# Patient Record
Sex: MALE | URBAN - NONMETROPOLITAN AREA
[De-identification: names, ages, dates, MRNs, and addresses within clinical notes are randomized per-mention and may not be internally consistent; named-entity substitution may affect disease eponyms.]

---

## 2017-01-24 ENCOUNTER — DOCTOR'S OFFICE (OUTPATIENT)
Dept: URBAN - NONMETROPOLITAN AREA CLINIC 1 | Facility: CLINIC | Age: 42
Setting detail: OPHTHALMOLOGY
End: 2017-01-24
Payer: COMMERCIAL

## 2017-01-24 ENCOUNTER — RX ONLY (RX ONLY)
Age: 42
End: 2017-01-24

## 2017-01-24 DIAGNOSIS — H52.13: ICD-10-CM

## 2017-01-24 PROBLEM — H01.005 BLEPHARITIS; RIGHT UPPER LID, RIGHT LOWER LID, LEFT UPPER LID, LEFT LOWER LID ;
+MGD: Status: ACTIVE | Noted: 2017-01-24

## 2017-01-24 PROBLEM — H01.002 BLEPHARITIS; RIGHT UPPER LID, RIGHT LOWER LID, LEFT UPPER LID, LEFT LOWER LID ;
+MGD: Status: ACTIVE | Noted: 2017-01-24

## 2017-01-24 PROBLEM — H01.004 BLEPHARITIS; RIGHT UPPER LID, RIGHT LOWER LID, LEFT UPPER LID, LEFT LOWER LID ;
+MGD: Status: ACTIVE | Noted: 2017-01-24

## 2017-01-24 PROBLEM — H01.001 BLEPHARITIS; RIGHT UPPER LID, RIGHT LOWER LID, LEFT UPPER LID, LEFT LOWER LID ;
+MGD: Status: ACTIVE | Noted: 2017-01-24

## 2017-01-24 PROCEDURE — NO CHARGE N/C PROFESSIONAL COURTESY: Performed by: OPHTHALMOLOGY

## 2017-01-24 ASSESSMENT — REFRACTION_MANIFEST
OD_VA3: 20/
OU_VA: 20/
OS_VA1: 20/
OD_VA2: 20/
OS_VA3: 20/
OS_VA2: 20/
OU_VA: 20/
OS_VA2: 20/
OS_VA1: 20/
OU_VA: 20/
OS_VA3: 20/
OD_VA1: 20/
OD_VA2: 20/
OD_VA1: 20/
OD_VA2: 20/
OD_VA3: 20/
OS_VA3: 20/
OS_VA1: 20/
OD_VA3: 20/
OS_VA2: 20/
OD_VA1: 20/

## 2017-01-24 ASSESSMENT — PACHYMETRY
OD_CT_UM: 563
OD_CT_CORRECTION: -1
OS_CT_CORRECTION: -3
OS_CT_UM: 580

## 2017-01-24 ASSESSMENT — REFRACTION_CURRENTRX
OD_OVR_VA: 20/
OS_OVR_VA: 20/
OD_OVR_VA: 20/
OS_OVR_VA: 20/
OD_OVR_VA: 20/
OS_OVR_VA: 20/

## 2017-01-24 ASSESSMENT — REFRACTION_AUTOREFRACTION
OD_SPHERE: -5.25
OD_AXIS: 176
OS_AXIS: 174
OS_SPHERE: -4.75
OD_CYLINDER: -2.25
OS_CYLINDER: -1.50

## 2017-01-24 ASSESSMENT — CORNEAL DYSTROPHY
OS_DYSTROPHY: PERIPHERAL CORNEAL DEGENERATION
OD_DYSTROPHY: PERIPHERAL CORNEAL DEGENERATION

## 2017-01-24 ASSESSMENT — SPHEQUIV_DERIVED
OS_SPHEQUIV: -5.5
OD_SPHEQUIV: -6.375

## 2017-01-24 ASSESSMENT — VISUAL ACUITY
OD_BCVA: 20/20-2
OS_BCVA: 20/20

## 2017-01-24 ASSESSMENT — LID EXAM ASSESSMENTS
OS_COMMENTS: +MGD
OD_COMMENTS: +MGD

## 2017-10-20 ENCOUNTER — ALLSCRIPTS OFFICE VISIT (OUTPATIENT)
Dept: OTHER | Facility: OTHER | Age: 42
End: 2017-10-20

## 2017-10-20 DIAGNOSIS — Z13.6 ENCOUNTER FOR SCREENING FOR CARDIOVASCULAR DISORDERS: ICD-10-CM

## 2017-11-03 ENCOUNTER — APPOINTMENT (OUTPATIENT)
Dept: LAB | Facility: MEDICAL CENTER | Age: 42
End: 2017-11-03
Payer: COMMERCIAL

## 2017-11-03 ENCOUNTER — TRANSCRIBE ORDERS (OUTPATIENT)
Dept: LAB | Facility: MEDICAL CENTER | Age: 42
End: 2017-11-03

## 2017-11-03 DIAGNOSIS — Z13.6 ENCOUNTER FOR SCREENING FOR CARDIOVASCULAR DISORDERS: ICD-10-CM

## 2017-11-03 LAB
ALBUMIN SERPL BCP-MCNC: 4.3 G/DL (ref 3.5–5)
ALP SERPL-CCNC: 41 U/L (ref 46–116)
ALT SERPL W P-5'-P-CCNC: 30 U/L (ref 12–78)
ANION GAP SERPL CALCULATED.3IONS-SCNC: 3 MMOL/L (ref 4–13)
AST SERPL W P-5'-P-CCNC: 14 U/L (ref 5–45)
BILIRUB SERPL-MCNC: 1.09 MG/DL (ref 0.2–1)
BUN SERPL-MCNC: 17 MG/DL (ref 5–25)
CALCIUM SERPL-MCNC: 9.1 MG/DL (ref 8.3–10.1)
CHLORIDE SERPL-SCNC: 106 MMOL/L (ref 100–108)
CHOLEST SERPL-MCNC: 188 MG/DL (ref 50–200)
CO2 SERPL-SCNC: 29 MMOL/L (ref 21–32)
CREAT SERPL-MCNC: 1 MG/DL (ref 0.6–1.3)
GFR SERPL CREATININE-BSD FRML MDRD: 94 ML/MIN/1.73SQ M
GLUCOSE P FAST SERPL-MCNC: 86 MG/DL (ref 65–99)
HDLC SERPL-MCNC: 67 MG/DL (ref 40–60)
LDLC SERPL CALC-MCNC: 108 MG/DL (ref 0–100)
POTASSIUM SERPL-SCNC: 4.4 MMOL/L (ref 3.5–5.3)
PROT SERPL-MCNC: 7.4 G/DL (ref 6.4–8.2)
SODIUM SERPL-SCNC: 138 MMOL/L (ref 136–145)
TRIGL SERPL-MCNC: 63 MG/DL

## 2017-11-03 PROCEDURE — 80061 LIPID PANEL: CPT

## 2017-11-03 PROCEDURE — 36415 COLL VENOUS BLD VENIPUNCTURE: CPT

## 2017-11-03 PROCEDURE — 80053 COMPREHEN METABOLIC PANEL: CPT

## 2017-11-06 ENCOUNTER — GENERIC CONVERSION - ENCOUNTER (OUTPATIENT)
Dept: OTHER | Facility: OTHER | Age: 42
End: 2017-11-06

## 2018-01-09 NOTE — RESULT NOTES
Verified Results  (1) COMPREHENSIVE METABOLIC PANEL [id number] 80:05II [facility] Order Number: [id number]     Test Name Result Flag Reference   SODIUM 138 mmol/L  136-145   POTASSIUM 4 4 mmol/L  3 5-5 3   CHLORIDE 106 mmol/L  100-108   CARBON DIOXIDE 29 mmol/L  21-32   ANION GAP (CALC) 3 mmol/L L 4-13   BLOOD UREA NITROGEN 17 mg/dL  5-25   CREATININE 1 00 mg/dL  0 60-1 30   Standardized to IDMS reference method   CALCIUM 9 1 mg/dL  8 3-10 1   BILI, TOTAL 1 09 mg/dL H 0 20-1 00   ALK PHOSPHATAS 41 U/L L    ALT (SGPT) 30 U/L  12-78   Specimen collection should occur prior to Sulfasalazine and/or Sulfapyridine administration due to the potential for falsely depressed results  AST(SGOT) 14 U/L  5-45   Specimen collection should occur prior to Sulfasalazine administration due to the potential for falsely depressed results  ALBUMIN 4 3 g/dL  3 5-5 0   TOTAL PROTEIN 7 4 g/dL  6 4-8 2   eGFR 94 ml/min/1 73sq m     National Kidney Disease Education Program recommendations are as follows:  GFR calculation is accurate only with a steady state creatinine  Chronic Kidney disease less than 60 ml/min/1 73 sq  meters  Kidney failure less than 15 ml/min/1 73 sq  meters  GLUCOSE FASTING 86 mg/dL  65-99   Specimen collection should occur prior to Sulfasalazine administration due to the potential for falsely depressed results  Specimen collection should occur prior to Sulfapyridine administration due to the potential for falsely elevated results  (1) LIPID PANEL, FASTING [id number] 08:34AM [facility] Order Number: [id number]     Test Name Result Flag Reference   CHOLESTEROL 188 mg/dL     HDL,DIRECT 67 mg/dL H 40-60   Specimen collection should occur prior to Metamizole administration due to the potential for falsley depressed results     LDL CHOLESTEROL CALCULATED 108 mg/dL H 0-100   Triglyceride:        Normal <150 mg/dl   Borderline High 150-199 mg/dl   High 200-499 mg/dl   Very High >499 mg/dl      Cholesterol:       Desirable <200 mg/dl    Borderline High 200-239 mg/dl    High >239 mg/dl      HDL Cholesterol:       High>59 mg/dL    Low <41 mg/dL      This screening LDL is a calculated result  It does not have the accuracy of the Direct Measured LDL in the monitoring of patients with hyperlipidemia and/or statin therapy  Direct Measure LDL (YNP884) must be ordered separately in these patients  TRIGLYCERIDES 63 mg/dL  <=150   Specimen collection should occur prior to N-Acetylcysteine or Metamizole administration due to the potential for falsely depressed results

## 2018-01-13 ENCOUNTER — APPOINTMENT (OUTPATIENT)
Dept: URGENT CARE | Facility: CLINIC | Age: 43
End: 2018-01-13
Payer: COMMERCIAL

## 2018-01-13 ENCOUNTER — GENERIC CONVERSION - ENCOUNTER (OUTPATIENT)
Dept: OTHER | Facility: OTHER | Age: 43
End: 2018-01-13

## 2018-01-13 PROCEDURE — 99203 OFFICE O/P NEW LOW 30 MIN: CPT

## 2018-01-17 NOTE — PROGRESS NOTES
Assessment    1  Encounter for preventive health examination (V70 0) (Z00 00)    Plan  Encounter for screening for cardiovascular disorders    · (1) COMPREHENSIVE METABOLIC PANEL; Status:Active; Requested [id number];    · (1) LIPID PANEL, FASTING; Status:Active; Requested [id number]; Health Maintenance    · Follow-up visit in 1 year Evaluation and Treatment  Follow-up  Status: Complete  Done:  20Oct2017  Need for influenza vaccination    · Fluzone Quadrivalent 0 5 ML Intramuscular Suspension Prefilled Syringe    Discussion/Summary  Impression: healthy adult male  Currently, he eats a healthy diet  Prostate cancer screening: PSA is not indicated  Testicular cancer screening: testicular cancer screening is not indicated  Colorectal cancer screening: colorectal cancer screening is not indicated  The immunizations will be given as outlined in the orders  Advice and education were given regarding nutrition and tobacco cessation  Patient discussion: discussed with the patient  Flu shot given today  We'll check his cholesterol  Follow-up yearly and when necessary  The treatment plan was reviewed with the patient/guardian  The patient/guardian understands and agrees with the treatment plan      Chief Complaint  New patient well visit      History of Present Illness  HM, Adult Male: The patient is being seen for a health maintenance evaluation  General Health: The patient's health since the last visit is described as good  He has regular dental visits  He denies vision problems  He denies hearing loss  Immunizations status: up to date  Lifestyle:  He consumes a diverse and healthy diet  He does not have any weight concerns  He exercises regularly  He uses tobacco  The patient is a current cigar smoker  Tobacco Use Duration: Smokes 4 cigars a year  Screening: cancer screening reviewed and current  HPI: New patient   Only significant past medical history was GERD a couple years ago, which resolved with weight loss  Denies any chest pain or shortness of breath  Patient exercises  No first-degree relatives with cancer      Review of Systems    Constitutional: No fever or chills, feels well, no tiredness, no recent weight gain or weight loss  Cardiovascular: No complaints of slow heart rate, no fast heart rate, no chest pain, no palpitations, no leg claudication, no lower extremity  Respiratory: No complaints of shortness of breath, no wheezing, no cough, no SOB on exertion, no orthopnea or PND  Gastrointestinal: No complaints of abdominal pain, no constipation, no nausea or vomiting, no diarrhea or bloody stools  Genitourinary: No complaints of dysuria, no incontinence, no hesitancy, no nocturia, no genital lesion, no testicular pain  Musculoskeletal: No complaints of arthralgia, no myalgias, no joint swelling or stiffness, no limb pain or swelling  Past Medical History    · History of gastroesophageal reflux (GERD) (V12 79) (Z87 19)    Surgical History    · History of Diagnostic Esophagogastroduodenoscopy    Family History  Mother    · No pertinent family history  Father    · No pertinent family history    Social History    · Always uses seat belt   · Caffeine use (V49 89) (F15 90)   · Cigar smoker (305 1) (F17 290)   · Employed   · Light tobacco smoker (305 1) (F17 200)   · Living will in place (V49 89) (Z78 9)   ·    · Pets/Animals: Cat   · Pets/Animals: Dog   · Yazdanism    Current Meds   1  Multi Vitamin Daily TABS; Therapy: (Recorded:20Oct2017) to Recorded    Allergies    1  No Known Drug Allergies    Vitals   Recorded: 24BWL4506 59:99MH   Systolic 321   Diastolic 74   Height 5 ft 10 in   Weight 186 lb    BMI Calculated 26 69   BSA Calculated 2 02     Physical Exam    Constitutional   General appearance: No acute distress, well appearing and well nourished  Pulmonary   Respiratory effort: No increased work of breathing or signs of respiratory distress      Auscultation of lungs: Clear to auscultation  Cardiovascular   Auscultation of heart: Normal rate and rhythm, normal S1 and S2, no murmurs  Examination of extremities for edema and/or varicosities: Normal     Psychiatric   Orientation to person, place and time: Normal     Mood and affect: Normal        Results/Data  PHQ-2 Adult Depression Screening 20Oct2017 01:29PM User, Adonay     Test Name Result Flag Reference   PHQ-2 Adult Depression Score 0     Over the last two weeks, how often have you been bothered by any of the following problems?   Little interest or pleasure in doing things: Not at all - 0  Feeling down, depressed, or hopeless: Not at all - 0   PHQ-2 Adult Depression Screening Negative         Signatures   Electronically signed by : Henna Dodson DO; Oct 20 2017  3:39PM EST                       (Author)

## 2018-01-22 VITALS
SYSTOLIC BLOOD PRESSURE: 110 MMHG | DIASTOLIC BLOOD PRESSURE: 74 MMHG | WEIGHT: 186 LBS | HEIGHT: 70 IN | BODY MASS INDEX: 26.63 KG/M2

## 2018-01-24 VITALS
OXYGEN SATURATION: 96 % | SYSTOLIC BLOOD PRESSURE: 124 MMHG | TEMPERATURE: 99.6 F | DIASTOLIC BLOOD PRESSURE: 58 MMHG | RESPIRATION RATE: 16 BRPM | HEART RATE: 96 BPM

## 2018-02-28 NOTE — MISCELLANEOUS
Message  Return to work or school:   Gomez Simmons is under my professional care   He was seen in my office on 01/13/18   He is able to return to work on  01/16/17            Signatures   Electronically signed by : Mikala Thomas DO; Jan 13 2018  8:55AM EST                       (Author)

## 2018-02-28 NOTE — PROGRESS NOTES
Assessment    1  URTI (acute upper respiratory infection) (465 9) (J06 9)    Plan  URTI (acute upper respiratory infection)    · Azithromycin 250 MG Oral Tablet; TAKE 2 TABLETS ON DAY 1 THEN TAKE 1  TABLET A DAY FOR 4 DAYS   · Benzonatate 200 MG Oral Capsule; Take one three times a day as needed for  cough   · Avoid exposure to cigarette smoke ; Status:Complete;   Done: 47DEN5467 08:47AM   · Drink at least 6 glasses of water or juice a day ; Status:Complete;   Done: 35ZXU5709  08:47AM   · The following home treatments may soothe a sore throat ; Status:Complete;   Done:  61EFL2609 08:47AM   · Follow Up if Not Better Evaluation and Treatment  Follow-up  Status: Complete  Done:  18NCB0907 08:47AM    Discussion/Summary  Discussion Summary:   I recommend supportive care fluids rest follow-up if not a lot better in  Medication Side Effects Reviewed: Possible side effects of new medications were reviewed with the patient/guardian today  Understands and agrees with treatment plan: The treatment plan was reviewed with the patient/guardian  The patient/guardian understands and agrees with the treatment plan   Counseling Documentation With Imm: The patient was counseled regarding instructions for management, impressions, importance of compliance with treatment  Chief Complaint    1  Cold Symptoms  Chief Complaint Free Text Note Form: Fever and cough for 2 days  Relates fever was 103 yesterday taken temporally      History of Present Illness  HPI: He is in today complaining sore throat congestion postnasal drip for the last week  He has tried over-the-counter medications      Review of Systems  Focused-Male:   Constitutional: fever, feeling poorly and feeling tired, but no chills  ENT: earache, sore throat, nasal discharge and hoarseness  Cardiovascular: no complaints of slow or fast heart rate, no chest pain, no palpitations, no leg claudication or lower extremity edema  Respiratory: cough and PND  Gastrointestinal: no complaints of abdominal pain, no constipation, no nausea or vomiting, no diarrhea or bloody stools  Genitourinary: no complaints of dysuria or incontinence, no hesitancy, no nocturia, no genital lesion, no inadequacy of penile erection  Musculoskeletal: myalgias  Active Problems    1  Encounter for screening for cardiovascular disorders (V81 2) (Z13 6)   2  Need for influenza vaccination (V04 81) (Z23)    Past Medical History    1  History of gastroesophageal reflux (GERD) (V12 79) (Z87 19)  Active Problems And Past Medical History Reviewed: The active problems and past medical history were reviewed and updated today  Family History  Mother    1  No pertinent family history  Father    2  No pertinent family history    Social History    · Always uses seat belt   · Caffeine use (V49 89) (F15 90)   · Cigar smoker (305 1) (F17 290)   · Employed   · Light tobacco smoker (305 1) (F17 200)   · Living will in place (V49 89) (Z78 9)   ·    · Pets/Animals: Cat   · Pets/Animals: Dog   · Amish  Social History Reviewed: The social history was reviewed and updated today  Surgical History    1  History of Diagnostic Esophagogastroduodenoscopy    Current Meds   1  Multi Vitamin Daily TABS; Therapy: (Recorded:29Ani0508) to Recorded  Medication List Reviewed: The medication list was reviewed and updated today  Allergies    1  No Known Drug Allergies    Vitals  Signs   Recorded: 16FCA8221 08:23AM   Temperature: 99 6 F  Heart Rate: 96  Respiration: 16  Systolic: 129  Diastolic: 58  O2 Saturation: 96    Physical Exam    Constitutional   General appearance: No acute distress, well appearing and well nourished  Eyes   Conjunctiva and lids: No swelling, erythema, or discharge  Pupils and irises: Equal, round and reactive to light      Ears, Nose, Mouth, and Throat   External inspection of ears and nose: Normal     Otoscopic examination: Tympanic membrance translucent with normal light reflex  Canals patent without erythema  Nasal mucosa, septum, and turbinates: Abnormal   There was a purulent discharge from both nares  The bilateral nasal mucosa was edematous  Oropharynx: Abnormal   The posterior pharynx was erythematous, but did not have an exudate  Oral mucosa was edematous  Pulmonary   Respiratory effort: No increased work of breathing or signs of respiratory distress  Auscultation of lungs: Abnormal   diffuse rales/crackles bilaterally  diffuse rhonchi bilaterally  no wheezing  Cardiovascular   Palpation of heart: Normal PMI, no thrills  Auscultation of heart: Normal rate and rhythm, normal S1 and S2, without murmurs  Examination of extremities for edema and/or varicosities: Normal     Abdomen   Abdomen: Non-tender, no masses  Liver and spleen: No hepatomegaly or splenomegaly  Lymphatic   Palpation of lymph nodes in neck: No lymphadenopathy  Musculoskeletal   Gait and station: Normal     Digits and nails: Normal without clubbing or cyanosis  Inspection/palpation of joints, bones, and muscles: Normal     Skin   Skin and subcutaneous tissue: Normal without rashes or lesions  Neurologic   Cranial nerves: Cranial nerves 2-12 intact  Reflexes: 2+ and symmetric  Sensation: No sensory loss      Psychiatric   Orientation to person, place and time: Normal     Mood and affect: Normal        Signatures   Electronically signed by : Torrey Syed DO; Jan 13 2018  8:48AM EST                       (Author)

## 2018-06-15 ENCOUNTER — OFFICE VISIT (OUTPATIENT)
Dept: FAMILY MEDICINE CLINIC | Facility: CLINIC | Age: 43
End: 2018-06-15
Payer: COMMERCIAL

## 2018-06-15 VITALS
WEIGHT: 184.4 LBS | HEIGHT: 71 IN | SYSTOLIC BLOOD PRESSURE: 102 MMHG | DIASTOLIC BLOOD PRESSURE: 70 MMHG | BODY MASS INDEX: 25.81 KG/M2

## 2018-06-15 DIAGNOSIS — F41.1 GENERALIZED ANXIETY DISORDER: Primary | ICD-10-CM

## 2018-06-15 PROCEDURE — 99214 OFFICE O/P EST MOD 30 MIN: CPT | Performed by: FAMILY MEDICINE

## 2018-06-15 PROCEDURE — 3008F BODY MASS INDEX DOCD: CPT | Performed by: FAMILY MEDICINE

## 2018-06-15 RX ORDER — MULTIVITAMIN
TABLET ORAL
COMMUNITY
End: 2022-06-10

## 2018-06-15 NOTE — PROGRESS NOTES
Assessment/Plan:  25 minutes spent with the patient, over half the time in counseling on treatment of anxiety and depression  Patient will call his insurance about counseling  I put in a prescription for sertraline 50 mg  He will start off taking half a pill, and over couple days may increase to a full pill daily  He will call us if he has any problems  We will see him back in the next 2-3 weeks or p r n  No problem-specific Assessment & Plan notes found for this encounter  Diagnoses and all orders for this visit:    Generalized anxiety disorder  -     sertraline (ZOLOFT) 50 mg tablet; Take 1 tablet (50 mg total) by mouth daily    Other orders  -     Multiple Vitamin (MULTI VITAMIN DAILY) TABS; Take by mouth          Subjective:      Patient ID: Mony Kumar is a 38 y o  male  Patient here today stating he has been having increased anxiety, insomnia, OCD  This started 2-3 months ago, but increased over the past 3 weeks  Recently lost a close head  Denies any SI or HI  Patient states has been more claustrophobic and has had more OCD symptoms  His wife asked him to come in to talk today  He has always had some OCD and claustrophobia, but again it has become worse over the past couple weeks  Patient has never treated for depression or anxiety  He denies any family history of that  Anxiety   Presents for initial visit  Onset was 1 to 6 months ago  The problem has been gradually worsening  Symptoms include compulsions, excessive worry, insomnia, nervous/anxious behavior, obsessions and restlessness  Patient reports no suicidal ideas  Symptoms occur constantly  The severity of symptoms is moderate  Nothing aggravates the symptoms  The quality of sleep is fair  Nighttime awakenings: occasional      Risk factors: Recent loss of close PET  Treatments tried: Has had some drinks at night to help him sleep         The following portions of the patient's history were reviewed and updated as appropriate:   He  has no past medical history on file  He   Patient Active Problem List    Diagnosis Date Noted    Generalized anxiety disorder 06/15/2018     He  has no past surgical history on file  His family history is not on file  He  reports that he has never smoked  He has never used smokeless tobacco  He reports that he drinks alcohol  He reports that he does not use drugs  Current Outpatient Prescriptions   Medication Sig Dispense Refill    Multiple Vitamin (MULTI VITAMIN DAILY) TABS Take by mouth      sertraline (ZOLOFT) 50 mg tablet Take 1 tablet (50 mg total) by mouth daily 30 tablet 5     No current facility-administered medications for this visit  No current outpatient prescriptions on file prior to visit  No current facility-administered medications on file prior to visit  He has No Known Allergies       Review of Systems   Constitutional: Negative  Respiratory: Negative  Cardiovascular: Negative  Gastrointestinal: Negative  Genitourinary: Negative  Psychiatric/Behavioral: Positive for dysphoric mood and sleep disturbance  Negative for suicidal ideas  The patient is nervous/anxious and has insomnia  Objective:      /70   Ht 5' 11" (1 803 m)   Wt 83 6 kg (184 lb 6 4 oz)   BMI 25 72 kg/m²          Physical Exam   Constitutional: He is oriented to person, place, and time  He appears well-developed and well-nourished  No distress  Cardiovascular: Normal rate, regular rhythm and normal heart sounds  Exam reveals no gallop and no friction rub  No murmur heard  Pulmonary/Chest: Effort normal and breath sounds normal  No respiratory distress  He has no wheezes  He has no rales  Musculoskeletal: He exhibits no edema  Neurological: He is alert and oriented to person, place, and time  Skin: He is not diaphoretic  Psychiatric: He has a normal mood and affect  His behavior is normal  Judgment and thought content normal    Vitals reviewed

## 2018-11-17 ENCOUNTER — OFFICE VISIT (OUTPATIENT)
Dept: URGENT CARE | Facility: CLINIC | Age: 43
End: 2018-11-17
Payer: COMMERCIAL

## 2018-11-17 ENCOUNTER — APPOINTMENT (OUTPATIENT)
Dept: RADIOLOGY | Facility: CLINIC | Age: 43
End: 2018-11-17
Payer: COMMERCIAL

## 2018-11-17 VITALS
SYSTOLIC BLOOD PRESSURE: 120 MMHG | BODY MASS INDEX: 24.38 KG/M2 | WEIGHT: 180 LBS | HEART RATE: 86 BPM | HEIGHT: 72 IN | RESPIRATION RATE: 18 BRPM | OXYGEN SATURATION: 97 % | DIASTOLIC BLOOD PRESSURE: 55 MMHG | TEMPERATURE: 98.8 F

## 2018-11-17 DIAGNOSIS — M79.644 PAIN OF RIGHT THUMB: Primary | ICD-10-CM

## 2018-11-17 DIAGNOSIS — S63.621A SPRAIN OF INTERPHALANGEAL JOINT OF RIGHT THUMB, INITIAL ENCOUNTER: ICD-10-CM

## 2018-11-17 DIAGNOSIS — M79.644 PAIN OF RIGHT THUMB: ICD-10-CM

## 2018-11-17 PROCEDURE — 73130 X-RAY EXAM OF HAND: CPT

## 2018-11-17 PROCEDURE — 99213 OFFICE O/P EST LOW 20 MIN: CPT | Performed by: FAMILY MEDICINE

## 2018-11-17 RX ORDER — MULTIVITAMIN
CAPSULE ORAL
COMMUNITY
End: 2019-06-02

## 2018-11-17 NOTE — PROGRESS NOTES
Assessment/Plan:  Possible cortical disruption of the right thumb  Will review with radiologist and call the patient  I do recommend he follow up with his family physician this week  Diagnoses and all orders for this visit:    Pain of right thumb  -     XR hand 3+ vw right; Future    Sprain of interphalangeal joint of right thumb, initial encounter    Other orders  -     Multiple Vitamin (MULTIVITAMIN) capsule; Take by mouth          Subjective:      Patient ID: Milana Garcia is a 38 y o  male  Patient presents with:  Thumb Pain: dislocated right thumb this morning    Complains of pain in the right thumb  He says that used to pop out before during sports  Today he was  his son's who were wrestling and injured that thumb  Is decreased range of motion secondary to pain  The following portions of the patient's history were reviewed and updated as appropriate: allergies, current medications, past family history, past medical history, past social history, past surgical history and problem list     Review of Systems   Constitutional: Negative  HENT: Negative  Eyes: Negative  Respiratory: Negative  Cardiovascular: Negative  Gastrointestinal: Negative  Endocrine: Negative  Genitourinary: Negative  Musculoskeletal: Positive for joint swelling  Pain and swelling in right  Skin: Negative  Allergic/Immunologic: Negative  Neurological: Negative  Hematological: Negative  Psychiatric/Behavioral: Negative  All other systems reviewed and are negative  Objective:      /55   Pulse 86   Temp 98 8 °F (37 1 °C)   Resp 18   Ht 6' (1 829 m)   Wt 81 6 kg (180 lb)   SpO2 97%   BMI 24 41 kg/m²          Physical Exam   Constitutional: He is oriented to person, place, and time  He appears well-developed and well-nourished  HENT:   Head: Normocephalic and atraumatic     Right Ear: External ear normal    Left Ear: External ear normal    Nose: Nose normal    Mouth/Throat: Oropharynx is clear and moist    Neck: Normal range of motion  Neck supple  Cardiovascular: Normal rate, regular rhythm and normal heart sounds  Pulmonary/Chest: Effort normal and breath sounds normal    Musculoskeletal: He exhibits edema and tenderness  He exhibits no deformity  Exam shows some swelling in the right first MCP  If neurovascular appears intact  Neurological: He is alert and oriented to person, place, and time  He has normal reflexes  Skin: Skin is warm and dry  Psychiatric: He has a normal mood and affect  His behavior is normal    Nursing note and vitals reviewed

## 2019-06-02 ENCOUNTER — OFFICE VISIT (OUTPATIENT)
Dept: URGENT CARE | Facility: CLINIC | Age: 44
End: 2019-06-02
Payer: COMMERCIAL

## 2019-06-02 VITALS
HEART RATE: 64 BPM | WEIGHT: 180 LBS | RESPIRATION RATE: 18 BRPM | SYSTOLIC BLOOD PRESSURE: 124 MMHG | TEMPERATURE: 97.7 F | OXYGEN SATURATION: 98 % | DIASTOLIC BLOOD PRESSURE: 58 MMHG | BODY MASS INDEX: 25.2 KG/M2 | HEIGHT: 71 IN

## 2019-06-02 DIAGNOSIS — J06.9 VIRAL UPPER RESPIRATORY TRACT INFECTION: Primary | ICD-10-CM

## 2019-06-02 PROCEDURE — 99213 OFFICE O/P EST LOW 20 MIN: CPT | Performed by: EMERGENCY MEDICINE

## 2019-06-02 RX ORDER — PREDNISONE 10 MG/1
TABLET ORAL
Qty: 27 TABLET | Refills: 0 | Status: SHIPPED | OUTPATIENT
Start: 2019-06-02 | End: 2019-07-01

## 2019-07-01 ENCOUNTER — OFFICE VISIT (OUTPATIENT)
Dept: FAMILY MEDICINE CLINIC | Facility: CLINIC | Age: 44
End: 2019-07-01
Payer: COMMERCIAL

## 2019-07-01 VITALS
DIASTOLIC BLOOD PRESSURE: 70 MMHG | SYSTOLIC BLOOD PRESSURE: 118 MMHG | WEIGHT: 180 LBS | HEIGHT: 71 IN | BODY MASS INDEX: 25.2 KG/M2

## 2019-07-01 DIAGNOSIS — Z00.00 ANNUAL PHYSICAL EXAM: Primary | ICD-10-CM

## 2019-07-01 PROCEDURE — 99396 PREV VISIT EST AGE 40-64: CPT | Performed by: FAMILY MEDICINE

## 2019-07-01 NOTE — PROGRESS NOTES
ADULT ANNUAL PHYSICAL  [facility] - [facility]    NAME: Wali Newman  AGE: 41 y o  SEX: male  : 1975     DATE: 2019     Assessment and Plan: For his motion sickness, patient will try taking Claritin daily for the next month to see if he gets improvement  He will call us if he continues to have problems, if so, we will refer him to ENT  Follow-up yearly and p r n     Problem List Items Addressed This Visit     None      Visit Diagnoses     Annual physical exam    -  Primary    BMI 25 0-25 9,adult              Immunizations and preventive care screenings were discussed with patient today  Appropriate education was printed on patient's after visit summary  Counseling:  · BMI Counseling: Body mass index is 25 1 kg/m²  Discussed the patient's BMI with him  The BMI is above average  BMI counseling and education was provided to the patient  Nutrition recommendations include reducing portion sizes, moderation in carbohydrate intake, increasing intake of lean protein, reducing intake of saturated fat and trans fat and reducing intake of cholesterol  No follow-ups on file  Chief Complaint:     Chief Complaint   Patient presents with    Annual Exam      History of Present Illness:     Adult Annual Physical   Patient here for a comprehensive physical exam  The patient reports Motion sickness at times  Diet and Physical Activity  · Diet/Nutrition: well balanced diet  · Exercise: moderate cardiovascular exercise and strength training exercises  Depression Screening  PHQ-9 Depression Screening    PHQ-9:    Frequency of the following problems over the past two weeks:       Little interest or pleasure in doing things:  0 - not at all  Feeling down, depressed, or hopeless:  0 - not at all  PHQ-2 Score:  0       General Health  · Sleep: sleeps well  · Hearing: normal - bilateral   · Vision: no vision problems  · Dental: regular dental visits          Health  · Symptoms include: none     Review of Systems:     Review of Systems   Constitutional: Negative  Respiratory: Negative  Cardiovascular: Negative  Gastrointestinal: Negative  Genitourinary: Negative  Neurological: Positive for light-headedness (Patient has noticed for the past 6 months or so that when he is driving sometimes gets motion sickness  It does not happen all the time  )        Past Medical History:     Past Medical History:   Diagnosis Date    Known health problems: none       Past Surgical History:     Past Surgical History:   Procedure Laterality Date    ESOPHAGOGASTRODUODENOSCOPY      LASIK        Family History:     Family History   Problem Relation Age of Onset    No Known Problems Mother     No Known Problems Father       Social History:     Social History     Socioeconomic History    Marital status: /Civil Union     Spouse name: None    Number of children: None    Years of education: None    Highest education level: None   Occupational History    None   Social Needs    Financial resource strain: None    Food insecurity:     Worry: None     Inability: None    Transportation needs:     Medical: None     Non-medical: None   Tobacco Use    Smoking status: Never Smoker    Smokeless tobacco: Never Used   Substance and Sexual Activity    Alcohol use: Yes     Comment: socially    Drug use: No    Sexual activity: None   Lifestyle    Physical activity:     Days per week: None     Minutes per session: None    Stress: None   Relationships    Social connections:     Talks on phone: None     Gets together: None     Attends [employer] service: None     Active member of club or organization: None     Attends meetings of clubs or organizations: None     Relationship status: None    Intimate partner violence:     Fear of current or ex partner: None     Emotionally abused: None     Physically abused: None     Forced sexual activity: None   Other Topics Concern    None   Social History Narrative        Cat and dog    Always uses seat belt    Caffeine use    Living will    Synagogue      Current Medications:     Current Outpatient Medications   Medication Sig Dispense Refill    Multiple Vitamin (MULTI VITAMIN DAILY) TABS Take by mouth       No current facility-administered medications for this visit  Allergies:     No Known Allergies   Physical Exam:     /70   Ht 5' 11" (1 803 m)   Wt 81 6 kg (180 lb)   BMI 25 10 kg/m²     Physical Exam   Constitutional: He is oriented to person, place, and time  He appears well-developed and well-nourished  No distress  HENT:   Head: Normocephalic and atraumatic  Right Ear: Tympanic membrane normal    Left Ear: Tympanic membrane is retracted  Eyes: Conjunctivae are normal    Cardiovascular: Normal rate, regular rhythm and normal heart sounds  Exam reveals no gallop and no friction rub  No murmur heard  Pulmonary/Chest: Effort normal and breath sounds normal  No respiratory distress  He has no wheezes  He has no rales  Musculoskeletal: He exhibits no edema  Neurological: He is alert and oriented to person, place, and time  Skin: He is not diaphoretic  Psychiatric: He has a normal mood and affect  His behavior is normal  Judgment and thought content normal    Vitals reviewed  [facility]  BMI Counseling: Body mass index is 25 1 kg/m²  Discussed the patient's BMI with him  The BMI is above average  BMI counseling and education was provided to the patient  Nutrition recommendations include reducing portion sizes, moderation in carbohydrate intake, increasing intake of lean protein, reducing intake of saturated fat and trans fat and reducing intake of cholesterol

## 2019-07-01 NOTE — PATIENT INSTRUCTIONS
Wellness Visit for Adults   AMBULATORY CARE:   A wellness visit  is when you see your healthcare provider to get screened for health problems  You can also get advice on how to stay healthy  Write down your questions so you remember to ask them  Ask your healthcare provider how often you should have a wellness visit  What happens at a wellness visit:  Your healthcare provider will ask about your health, and your family history of health problems  This includes high blood pressure, heart disease, and cancer  He or she will ask if you have symptoms that concern you, if you smoke, and about your mood  You may also be asked about your intake of medicines, supplements, food, and alcohol  Any of the following may be done:  · Your weight  will be checked  Your height may also be checked so your body mass index (BMI) can be calculated  Your BMI shows if you are at a healthy weight  · Your blood pressure  and heart rate will be checked  Your temperature may also be checked  · Blood and urine tests  may be done  Blood tests may be done to check your cholesterol levels  Abnormal cholesterol levels increase your risk for heart disease and stroke  You may also need a blood or urine test to check for diabetes if you are at increased risk  Urine tests may be done to look for signs of an infection or kidney disease  · A physical exam  includes checking your heartbeat and lungs with a stethoscope  Your healthcare provider may also check your skin to look for sun damage  · Screening tests  may be recommended  A screening test is done to check for diseases that may not cause symptoms  The screening tests you may need depend on your age, gender, family history, and lifestyle habits  For example, colorectal screening may be recommended if you are 48years old or older  Screening tests you need if you are a woman:   · A Pap smear  is used to screen for cervical cancer   Pap smears are usually done every 3 to 5 years depending on your age  You may need them more often if you have had abnormal Pap smear test results in the past  Ask your healthcare provider how often you should have a Pap smear  · A mammogram  is an x-ray of your breasts to screen for breast cancer  Experts recommend mammograms every 2 years starting at age 48 years  You may need a mammogram at age 52 years or younger if you have an increased risk for breast cancer  Talk to your healthcare provider about when you should start having mammograms and how often you need them  Vaccines you may need:   · Get an influenza vaccine  every year  The influenza vaccine protects you from the flu  Several types of viruses cause the flu  The viruses change over time, so new vaccines are made each year  · Get a tetanus-diphtheria (Td) booster vaccine  every 10 years  This vaccine protects you against tetanus and diphtheria  Tetanus is a severe infection that may cause painful muscle spasms and lockjaw  Diphtheria is a severe bacterial infection that causes a thick covering in the back of your mouth and throat  · Get a human papillomavirus (HPV) vaccine  if you are female and aged 23 to 32 or male 23 to 24 and never received it  This vaccine protects you from HPV infection  HPV is the most common infection spread by sexual contact  HPV may also cause vaginal, penile, and anal cancers  · Get a pneumococcal vaccine  if you are aged 72 years or older  The pneumococcal vaccine is an injection given to protect you from pneumococcal disease  Pneumococcal disease is an infection caused by pneumococcal bacteria  The infection may cause pneumonia, meningitis, or an ear infection  · Get a shingles vaccine  if you are aged 61 or older, even if you have had shingles before  The shingles vaccine is an injection to protect you from the varicella-zoster virus  This is the same virus that causes chickenpox   Shingles is a painful rash that develops in people who had chickenpox or have been exposed to the virus  How to eat healthy:  My Plate is a model for planning healthy meals  It shows the types and amounts of foods that should go on your plate  Fruits and vegetables make up about half of your plate, and grains and protein make up the other half  A serving of dairy is included on the side of your plate  The amount of calories and serving sizes you need depends on your age, gender, weight, and height  Examples of healthy foods are listed below:  · Eat a variety of vegetables  such as dark green, red, and orange vegetables  You can also include canned vegetables low in sodium (salt) and frozen vegetables without added butter or sauces  · Eat a variety of fresh fruits , canned fruit in 100% juice, frozen fruit, and dried fruit  · Include whole grains  At least half of the grains you eat should be whole grains  Examples include whole-wheat bread, wheat pasta, brown rice, and whole-grain cereals such as oatmeal     · Eat a variety of protein foods such as seafood (fish and shellfish), lean meat, and poultry without skin (turkey and chicken)  Examples of lean meats include pork leg, shoulder, or tenderloin, and beef round, sirloin, tenderloin, and extra lean ground beef  Other protein foods include eggs and egg substitutes, beans, peas, soy products, nuts, and seeds  · Choose low-fat dairy products such as skim or 1% milk or low-fat yogurt, cheese, and cottage cheese  · Limit unhealthy fats  such as butter, hard margarine, and shortening  Exercise:  Exercise at least 30 minutes per day on most days of the week  Some examples of exercise include walking, biking, dancing, and swimming  You can also fit in more physical activity by taking the stairs instead of the elevator or parking farther away from stores  Include muscle strengthening activities 2 days each week  Regular exercise provides many health benefits   It helps you manage your weight, and decreases your risk for type 2 diabetes, heart disease, stroke, and high blood pressure  Exercise can also help improve your mood  Ask your healthcare provider about the best exercise plan for you  General health and safety guidelines:   · Do not smoke  Nicotine and other chemicals in cigarettes and cigars can cause lung damage  Ask your healthcare provider for information if you currently smoke and need help to quit  E-cigarettes or smokeless tobacco still contain nicotine  Talk to your healthcare provider before you use these products  · Limit alcohol  A drink of alcohol is 12 ounces of beer, 5 ounces of wine, or 1½ ounces of liquor  · Lose weight, if needed  Being overweight increases your risk of certain health conditions  These include heart disease, high blood pressure, type 2 diabetes, and certain types of cancer  · Protect your skin  Do not sunbathe or use tanning beds  Use sunscreen with a SPF 15 or higher  Apply sunscreen at least 15 minutes before you go outside  Reapply sunscreen every 2 hours  Wear protective clothing, hats, and sunglasses when you are outside  · Drive safely  Always wear your seatbelt  Make sure everyone in your car wears a seatbelt  A seatbelt can save your life if you are in an accident  Do not use your cell phone when you are driving  This could distract you and cause an accident  Pull over if you need to make a call or send a text message  · Practice safe sex  Use latex condoms if are sexually active and have more than one partner  Your healthcare provider may recommend screening tests for sexually transmitted infections (STIs)  · Wear helmets, lifejackets, and protective gear  Always wear a helmet when you ride a bike or motorcycle, go skiing, or play sports that could cause a head injury  Wear protective equipment when you play sports  Wear a lifejacket when you are on a boat or doing water sports    © 2017 [employer] Information is for End User's use only and may not be sold, redistributed or otherwise used for commercial purposes  All illustrations and images included in CareNotes® are the copyrighted property of [employer]  or [employer]  The above information is an  only  It is not intended as medical advice for individual conditions or treatments  Talk to your doctor, nurse or pharmacist before following any medical regimen to see if it is safe and effective for you  Heart Healthy Diet   AMBULATORY CARE:   A heart healthy diet  is an eating plan low in total fat, unhealthy fats, and sodium (salt)  A heart healthy diet helps decrease your risk for heart disease and stroke  Limit the amount of fat you eat to 25% to 35% of your total daily calories  Limit sodium to less than 2,300 mg each day  Healthy fats:  Healthy fats can help improve cholesterol levels  The risk for heart disease is decreased when cholesterol levels are normal  Choose healthy fats, such as the following:  · Unsaturated fat  is found in foods such as soybean, canola, olive, corn, and safflower oils  It is also found in soft tub margarine that is made with liquid vegetable oil  · Omega-3 fat  is found in certain fish, such as salmon, tuna, and trout, and in walnuts and flaxseed  Unhealthy fats:  Unhealthy fats can cause unhealthy cholesterol levels in your blood and increase your risk of heart disease  Limit unhealthy fats, such as the following:  · Cholesterol  is found in animal foods, such as eggs and lobster, and in dairy products made from whole milk  Limit cholesterol to less than 300 milligrams (mg) each day  You may need to limit cholesterol to 200 mg each day if you have heart disease  · Saturated fat  is found in meats, such as mcintyre and hamburger  It is also found in chicken or turkey skin, whole milk, and butter  Limit saturated fat to less than 7% of your total daily calories   Limit saturated fat to less than 6% if you have heart disease or are at increased risk for it  · Trans fat  is found in packaged foods, such as potato chips and cookies  It is also in hard margarine, some fried foods, and shortening  Avoid trans fats as much as possible    Heart healthy foods and drinks to include:  Ask your dietitian or healthcare provider how many servings to have from each of the following food groups:  · Grains:      ¨ Whole-wheat breads, cereals, and pastas, and brown rice    ¨ Low-fat, low-sodium crackers and chips    · Vegetables:      ¨ Broccoli, green beans, green peas, and spinach    ¨ Collards, kale, and lima beans    ¨ Carrots, sweet potatoes, tomatoes, and peppers    ¨ Canned vegetables with no salt added    · Fruits:      ¨ Bananas, peaches, pears, and pineapple    ¨ Grapes, raisins, and dates    ¨ Oranges, tangerines, grapefruit, orange juice, and grapefruit juice    ¨ Apricots, mangoes, melons, and papaya    ¨ Raspberries and strawberries    ¨ Canned fruit with no added sugar    · Low-fat dairy products:      ¨ Nonfat (skim) milk, 1% milk, and low-fat almond, cashew, or soy milks fortified with calcium    ¨ Low-fat cheese, regular or frozen yogurt, and cottage cheese    · Meats and proteins , such as lean cuts of beef and pork (loin, leg, round), skinless chicken and turkey, legumes, soy products, egg whites, and nuts  Foods and drinks to limit or avoid:  Ask your dietitian or healthcare provider about these and other foods that are high in unhealthy fat, sodium, and sugar:  · Snack or packaged foods , such as frozen dinners, cookies, macaroni and cheese, and cereals with more than 300 mg of sodium per serving    · Canned or dry mixes  for cakes, soups, sauces, or gravies    · Vegetables with added sodium , such as instant potatoes, vegetables with added sauces, or regular canned vegetables    · Other foods high in sodium , such as ketchup, barbecue sauce, salad dressing, pickles, olives, soy sauce, and miso    · High-fat dairy foods  such as whole or 2% milk, cream cheese, or sour cream, and cheeses     · High-fat protein foods  such as high-fat cuts of beef (T-bone steaks, ribs), chicken or turkey with skin, and organ meats, such as liver    · Cured or smoked meats , such as hot dogs, mcintyre, and sausage    · Unhealthy fats and oils , such as butter, stick margarine, shortening, and cooking oils such as coconut or palm oil    · Food and drinks high in sugar , such as soft drinks (soda), sports drinks, sweetened tea, candy, cake, cookies, pies, and doughnuts  Other diet guidelines to follow:   · Eat more foods containing omega-3 fats  Eat fish high in omega-3 fats at least 2 times a week  · Limit alcohol  Too much alcohol can damage your heart and raise your blood pressure  Women should limit alcohol to 1 drink a day  Men should limit alcohol to 2 drinks a day  A drink of alcohol is 12 ounces of beer, 5 ounces of wine, or 1½ ounces of liquor  · Choose low-sodium foods  High-sodium foods can lead to high blood pressure  Add little or no salt to food you prepare  Use herbs and spices in place of salt  · Eat more fiber  to help lower cholesterol levels  Eat at least 5 servings of fruits and vegetables each day  Eat 3 ounces of whole-grain foods each day  Legumes (beans) are also a good source of fiber  Lifestyle guidelines:   · Do not smoke  Nicotine and other chemicals in cigarettes and cigars can cause lung and heart damage  Ask your healthcare provider for information if you currently smoke and need help to quit  E-cigarettes or smokeless tobacco still contain nicotine  Talk to your healthcare provider before you use these products  · Exercise regularly  to help you maintain a healthy weight and improve your blood pressure and cholesterol levels  Ask your healthcare provider about the best exercise plan for you  Do not start an exercise program without asking your healthcare provider     Follow up with your healthcare provider as directed:  Write down your questions so you remember to ask them during your visits  © 2017 [employer] Information is for End User's use only and may not be sold, redistributed or otherwise used for commercial purposes  All illustrations and images included in CareNotes® are the copyrighted property of [employer]  or [employer]  The above information is an  only  It is not intended as medical advice for individual conditions or treatments  Talk to your doctor, nurse or pharmacist before following any medical regimen to see if it is safe and effective for you

## 2021-03-12 ENCOUNTER — IMMUNIZATIONS (OUTPATIENT)
Dept: FAMILY MEDICINE CLINIC | Facility: HOSPITAL | Age: 46
End: 2021-03-12

## 2021-03-12 DIAGNOSIS — Z23 ENCOUNTER FOR IMMUNIZATION: Primary | ICD-10-CM

## 2021-03-12 PROCEDURE — 91301 SARS-COV-2 / COVID-19 MRNA VACCINE (MODERNA) 100 MCG: CPT

## 2021-03-12 PROCEDURE — 0011A SARS-COV-2 / COVID-19 MRNA VACCINE (MODERNA) 100 MCG: CPT

## 2021-04-06 ENCOUNTER — OFFICE VISIT (OUTPATIENT)
Dept: FAMILY MEDICINE CLINIC | Facility: CLINIC | Age: 46
End: 2021-04-06
Payer: COMMERCIAL

## 2021-04-06 VITALS
DIASTOLIC BLOOD PRESSURE: 82 MMHG | WEIGHT: 196.8 LBS | SYSTOLIC BLOOD PRESSURE: 120 MMHG | TEMPERATURE: 99.2 F | BODY MASS INDEX: 28.18 KG/M2 | HEIGHT: 70 IN

## 2021-04-06 DIAGNOSIS — M25.511 CHRONIC RIGHT SHOULDER PAIN: ICD-10-CM

## 2021-04-06 DIAGNOSIS — G89.29 CHRONIC RIGHT SHOULDER PAIN: ICD-10-CM

## 2021-04-06 DIAGNOSIS — Z00.00 ANNUAL PHYSICAL EXAM: Primary | ICD-10-CM

## 2021-04-06 DIAGNOSIS — Z13.6 SCREENING FOR CARDIOVASCULAR CONDITION: ICD-10-CM

## 2021-04-06 PROCEDURE — 99396 PREV VISIT EST AGE 40-64: CPT | Performed by: FAMILY MEDICINE

## 2021-04-06 PROCEDURE — 3725F SCREEN DEPRESSION PERFORMED: CPT | Performed by: FAMILY MEDICINE

## 2021-04-06 NOTE — PATIENT INSTRUCTIONS
Wellness Visit for Adults   AMBULATORY CARE:   A wellness visit  is when you see your healthcare provider to get screened for health problems  Your healthcare provider will also give you advice on how to stay healthy  Write down your questions so you remember to ask them  Ask your healthcare provider how often you should have a wellness visit  What happens at a wellness visit:  Your healthcare provider will ask about your health, and your family history of health problems  This includes high blood pressure, heart disease, and cancer  He or she will ask if you have symptoms that concern you, if you smoke, and about your mood  You may also be asked about your intake of medicines, supplements, food, and alcohol  Any of the following may be done:  · Your weight  will be checked  Your height may also be checked so your body mass index (BMI) can be calculated  Your BMI shows if you are at a healthy weight  · Your blood pressure  and heart rate will be checked  Your temperature may also be checked  · Blood and urine tests  may be done  Blood tests may be done to check your cholesterol levels  Abnormal cholesterol levels increase your risk for heart disease and stroke  You may also need a blood or urine test to check for diabetes if you are at increased risk  Urine tests may be done to look for signs of an infection or kidney disease  · A physical exam  includes checking your heartbeat and lungs with a stethoscope  Your healthcare provider may also check your skin to look for sun damage  · Screening tests  may be recommended  A screening test is done to check for diseases that may not cause symptoms  The screening tests you may need depend on your age, gender, family history, and lifestyle habits  For example, colorectal screening may be recommended if you are 48years old or older  Screening tests you need if you are a woman:   · A Pap smear  is used to screen for cervical cancer   Pap smears are usually done every 3 to 5 years depending on your age  You may need them more often if you have had abnormal Pap smear test results in the past  Ask your healthcare provider how often you should have a Pap smear  · A mammogram  is an x-ray of your breasts to screen for breast cancer  Experts recommend mammograms every 2 years starting at age 48 years  You may need a mammogram at age 52 years or younger if you have an increased risk for breast cancer  Talk to your healthcare provider about when you should start having mammograms and how often you need them  Vaccines you may need:   · Get an influenza vaccine  every year  The influenza vaccine protects you from the flu  Several types of viruses cause the flu  The viruses change over time, so new vaccines are made each year  · Get a tetanus-diphtheria (Td) booster vaccine  every 10 years  This vaccine protects you against tetanus and diphtheria  Tetanus is a severe infection that may cause painful muscle spasms and lockjaw  Diphtheria is a severe bacterial infection that causes a thick covering in the back of your mouth and throat  · Get a human papillomavirus (HPV) vaccine  if you are female and aged 23 to 32 or male 23 to 24 and never received it  This vaccine protects you from HPV infection  HPV is the most common infection spread by sexual contact  HPV may also cause vaginal, penile, and anal cancers  · Get a pneumococcal vaccine  if you are aged 72 years or older  The pneumococcal vaccine is an injection given to protect you from pneumococcal disease  Pneumococcal disease is an infection caused by pneumococcal bacteria  The infection may cause pneumonia, meningitis, or an ear infection  · Get a shingles vaccine  if you are 60 or older, even if you have had shingles before  The shingles vaccine is an injection to protect you from the varicella-zoster virus  This is the same virus that causes chickenpox   Shingles is a painful rash that develops in people who had chickenpox or have been exposed to the virus  How to eat healthy:  My Plate is a model for planning healthy meals  It shows the types and amounts of foods that should go on your plate  Fruits and vegetables make up about half of your plate, and grains and protein make up the other half  A serving of dairy is included on the side of your plate  The amount of calories and serving sizes you need depends on your age, gender, weight, and height  Examples of healthy foods are listed below:  · Eat a variety of vegetables  such as dark green, red, and orange vegetables  You can also include canned vegetables low in sodium (salt) and frozen vegetables without added butter or sauces  · Eat a variety of fresh fruits , canned fruit in 100% juice, frozen fruit, and dried fruit  · Include whole grains  At least half of the grains you eat should be whole grains  Examples include whole-wheat bread, wheat pasta, brown rice, and whole-grain cereals such as oatmeal     · Eat a variety of protein foods such as seafood (fish and shellfish), lean meat, and poultry without skin (turkey and chicken)  Examples of lean meats include pork leg, shoulder, or tenderloin, and beef round, sirloin, tenderloin, and extra lean ground beef  Other protein foods include eggs and egg substitutes, beans, peas, soy products, nuts, and seeds  · Choose low-fat dairy products such as skim or 1% milk or low-fat yogurt, cheese, and cottage cheese  · Limit unhealthy fats  such as butter, hard margarine, and shortening  Exercise:  Exercise at least 30 minutes per day on most days of the week  Some examples of exercise include walking, biking, dancing, and swimming  You can also fit in more physical activity by taking the stairs instead of the elevator or parking farther away from stores  Include muscle strengthening activities 2 days each week  Regular exercise provides many health benefits   It helps you manage your weight, and decreases your risk for type 2 diabetes, heart disease, stroke, and high blood pressure  Exercise can also help improve your mood  Ask your healthcare provider about the best exercise plan for you  General health and safety guidelines:   · Do not smoke  Nicotine and other chemicals in cigarettes and cigars can cause lung damage  Ask your healthcare provider for information if you currently smoke and need help to quit  E-cigarettes or smokeless tobacco still contain nicotine  Talk to your healthcare provider before you use these products  · Limit alcohol  A drink of alcohol is 12 ounces of beer, 5 ounces of wine, or 1½ ounces of liquor  · Lose weight, if needed  Being overweight increases your risk of certain health conditions  These include heart disease, high blood pressure, type 2 diabetes, and certain types of cancer  · Protect your skin  Do not sunbathe or use tanning beds  Use sunscreen with a SPF 15 or higher  Apply sunscreen at least 15 minutes before you go outside  Reapply sunscreen every 2 hours  Wear protective clothing, hats, and sunglasses when you are outside  · Drive safely  Always wear your seatbelt  Make sure everyone in your car wears a seatbelt  A seatbelt can save your life if you are in an accident  Do not use your cell phone when you are driving  This could distract you and cause an accident  Pull over if you need to make a call or send a text message  · Practice safe sex  Use latex condoms if are sexually active and have more than one partner  Your healthcare provider may recommend screening tests for sexually transmitted infections (STIs)  · Wear helmets, lifejackets, and protective gear  Always wear a helmet when you ride a bike or motorcycle, go skiing, or play sports that could cause a head injury  Wear protective equipment when you play sports  Wear a lifejacket when you are on a boat or doing water sports      © Copyright [employer] 2020 Information is for End User's use only and may not be sold, redistributed or otherwise used for commercial purposes  All illustrations and images included in CareNotes® are the copyrighted property of [employer]  or Jerardo Santiago  The above information is an  only  It is not intended as medical advice for individual conditions or treatments  Talk to your doctor, nurse or pharmacist before following any medical regimen to see if it is safe and effective for you  Heart Healthy Diet   AMBULATORY CARE:   A heart healthy diet  is an eating plan low in unhealthy fats and sodium (salt)  The plan is high in healthy fats and fiber  A heart healthy diet helps improve your cholesterol levels and lowers your risk for heart disease and stroke  A dietitian will teach you how to read and understand food labels  Heart healthy diet guidelines to follow:   · Choose foods that contain healthy fats  ? Unsaturated fats  include monounsaturated and polyunsaturated fats  Unsaturated fat is found in foods such as soybean, canola, olive, corn, and safflower oils  It is also found in soft tub margarine that is made with liquid vegetable oil  ? Omega-3 fat  is found in certain fish, such as salmon, tuna, and trout, and in walnuts and flaxseed  Eat fish high in omega-3 fats at least 2 times a week  · Get 20 to 30 grams of fiber each day  Fruits, vegetables, whole-grain foods, and legumes (cooked beans) are good sources of fiber  · Limit or do not have unhealthy fats  ? Cholesterol  is found in animal foods, such as eggs and lobster, and in dairy products made from whole milk  Limit cholesterol to less than 200 mg each day  ? Saturated fat  is found in meats, such as mcintyre and hamburger  It is also found in chicken or turkey skin, whole milk, and butter  Limit saturated fat to less than 7% of your total daily calories  ? Trans fat  is found in packaged foods, such as potato chips and cookies   It is also in hard margarine, some fried foods, and shortening  Do not eat foods that contain trans fats  · Limit sodium as directed  You may be told to limit sodium to 2,000 to 2,300 mg each day  Choose low-sodium or no-salt-added foods  Add little or no salt to food you prepare  Use herbs and spices in place of salt  Include the following in your heart healthy plan:  Ask your dietitian or healthcare provider how many servings to have from each of the following food groups:  · Grains:      ? Whole-wheat breads, cereals, and pastas, and brown rice    ? Low-fat, low-sodium crackers and chips    · Vegetables:      ? Broccoli, green beans, green peas, and spinach    ? Collards, kale, and lima beans    ? Carrots, sweet potatoes, tomatoes, and peppers    ? Canned vegetables with no salt added    · Fruits:      ? Bananas, peaches, pears, and pineapple    ? Grapes, raisins, and dates    ? Oranges, tangerines, grapefruit, orange juice, and grapefruit juice    ? Apricots, mangoes, melons, and papaya    ? Raspberries and strawberries    ? Canned fruit with no added sugar    · Low-fat dairy:      ? Nonfat (skim) milk, 1% milk, and low-fat almond, cashew, or soy milks fortified with calcium    ? Low-fat cheese, regular or frozen yogurt, and cottage cheese    · Meats and proteins:      ? Lean cuts of beef and pork (loin, leg, round), skinless chicken and turkey    ? Legumes, soy products, egg whites, or nuts    Limit or do not include the following in your heart healthy plan:   · Unhealthy fats and oils:      ? Whole or 2% milk, cream cheese, sour cream, or cheese    ? High-fat cuts of beef (T-bone steaks, ribs), chicken or turkey with skin, and organ meats such as liver    ?  Butter, stick margarine, shortening, and cooking oils such as coconut or palm oil    · Foods and liquids high in sodium:      ? Packaged foods, such as frozen dinners, cookies, macaroni and cheese, and cereals with more than 300 mg of sodium per serving    ? Vegetables with added sodium, such as instant potatoes, vegetables with added sauces, or regular canned vegetables    ? Cured or smoked meats, such as hot dogs, mcintyre, and sausage    ? High-sodium ketchup, barbecue sauce, salad dressing, pickles, olives, soy sauce, or miso    · Foods and liquids high in sugar:      ? Candy, cake, cookies, pies, or doughnuts    ? Soft drinks (soda), sports drinks, or sweetened tea    ? Canned or dry mixes for cakes, soups, sauces, or gravies    Other healthy heart guidelines:   · Do not smoke  Nicotine and other chemicals in cigarettes and cigars can cause lung and heart damage  Ask your healthcare provider for information if you currently smoke and need help to quit  E-cigarettes or smokeless tobacco still contain nicotine  Talk to your healthcare provider before you use these products  · Limit or do not drink alcohol as directed  Alcohol can damage your heart and raise your blood pressure  Your healthcare provider may give you specific daily and weekly limits  The general recommended limit is 1 drink a day for women 21 or older and for men 72 or older  Do not have more than 3 drinks in a day or 7 in a week  The recommended limit is 2 drinks a day for men 24to 59years of age  Do not have more than 4 drinks in a day or 14 in a week  A drink of alcohol is 12 ounces of beer, 5 ounces of wine, or 1½ ounces of liquor  · Exercise regularly  Exercise can help you maintain a healthy weight and improve your blood pressure and cholesterol levels  Regular exercise can also decrease your risk for heart problems  Ask your healthcare provider about the best exercise plan for you  Do not start an exercise program without asking your healthcare provider  Follow up with your doctor or cardiologist as directed:  Write down your questions so you remember to ask them during your visits    © Copyright [employer] 2020 Information is for End User's use only and may not be sold, redistributed or otherwise used for commercial purposes  All illustrations and images included in CareNotes® are the copyrighted property of [employer]  or Jerardo Santiago  The above information is an  only  It is not intended as medical advice for individual conditions or treatments  Talk to your doctor, nurse or pharmacist before following any medical regimen to see if it is safe and effective for you  Wellness Visit for Adults   AMBULATORY CARE:   A wellness visit  is when you see your healthcare provider to get screened for health problems  Your healthcare provider will also give you advice on how to stay healthy  Write down your questions so you remember to ask them  Ask your healthcare provider how often you should have a wellness visit  What happens at a wellness visit:  Your healthcare provider will ask about your health, and your family history of health problems  This includes high blood pressure, heart disease, and cancer  He or she will ask if you have symptoms that concern you, if you smoke, and about your mood  You may also be asked about your intake of medicines, supplements, food, and alcohol  Any of the following may be done:  · Your weight  will be checked  Your height may also be checked so your body mass index (BMI) can be calculated  Your BMI shows if you are at a healthy weight  · Your blood pressure  and heart rate will be checked  Your temperature may also be checked  · Blood and urine tests  may be done  Blood tests may be done to check your cholesterol levels  Abnormal cholesterol levels increase your risk for heart disease and stroke  You may also need a blood or urine test to check for diabetes if you are at increased risk  Urine tests may be done to look for signs of an infection or kidney disease  · A physical exam  includes checking your heartbeat and lungs with a stethoscope   Your healthcare provider may also check your skin to look for sun damage  · Screening tests  may be recommended  A screening test is done to check for diseases that may not cause symptoms  The screening tests you may need depend on your age, gender, family history, and lifestyle habits  For example, colorectal screening may be recommended if you are 48years old or older  Screening tests you need if you are a woman:   · A Pap smear  is used to screen for cervical cancer  Pap smears are usually done every 3 to 5 years depending on your age  You may need them more often if you have had abnormal Pap smear test results in the past  Ask your healthcare provider how often you should have a Pap smear  · A mammogram  is an x-ray of your breasts to screen for breast cancer  Experts recommend mammograms every 2 years starting at age 48 years  You may need a mammogram at age 52 years or younger if you have an increased risk for breast cancer  Talk to your healthcare provider about when you should start having mammograms and how often you need them  Vaccines you may need:   · Get an influenza vaccine  every year  The influenza vaccine protects you from the flu  Several types of viruses cause the flu  The viruses change over time, so new vaccines are made each year  · Get a tetanus-diphtheria (Td) booster vaccine  every 10 years  This vaccine protects you against tetanus and diphtheria  Tetanus is a severe infection that may cause painful muscle spasms and lockjaw  Diphtheria is a severe bacterial infection that causes a thick covering in the back of your mouth and throat  · Get a human papillomavirus (HPV) vaccine  if you are female and aged 23 to 32 or male 23 to 24 and never received it  This vaccine protects you from HPV infection  HPV is the most common infection spread by sexual contact  HPV may also cause vaginal, penile, and anal cancers  · Get a pneumococcal vaccine  if you are aged 72 years or older   The pneumococcal vaccine is an injection given to protect you from pneumococcal disease  Pneumococcal disease is an infection caused by pneumococcal bacteria  The infection may cause pneumonia, meningitis, or an ear infection  · Get a shingles vaccine  if you are 60 or older, even if you have had shingles before  The shingles vaccine is an injection to protect you from the varicella-zoster virus  This is the same virus that causes chickenpox  Shingles is a painful rash that develops in people who had chickenpox or have been exposed to the virus  How to eat healthy:  My Plate is a model for planning healthy meals  It shows the types and amounts of foods that should go on your plate  Fruits and vegetables make up about half of your plate, and grains and protein make up the other half  A serving of dairy is included on the side of your plate  The amount of calories and serving sizes you need depends on your age, gender, weight, and height  Examples of healthy foods are listed below:  · Eat a variety of vegetables  such as dark green, red, and orange vegetables  You can also include canned vegetables low in sodium (salt) and frozen vegetables without added butter or sauces  · Eat a variety of fresh fruits , canned fruit in 100% juice, frozen fruit, and dried fruit  · Include whole grains  At least half of the grains you eat should be whole grains  Examples include whole-wheat bread, wheat pasta, brown rice, and whole-grain cereals such as oatmeal     · Eat a variety of protein foods such as seafood (fish and shellfish), lean meat, and poultry without skin (turkey and chicken)  Examples of lean meats include pork leg, shoulder, or tenderloin, and beef round, sirloin, tenderloin, and extra lean ground beef  Other protein foods include eggs and egg substitutes, beans, peas, soy products, nuts, and seeds  · Choose low-fat dairy products such as skim or 1% milk or low-fat yogurt, cheese, and cottage cheese      · Limit unhealthy fats  such as butter, hard margarine, and shortening  Exercise:  Exercise at least 30 minutes per day on most days of the week  Some examples of exercise include walking, biking, dancing, and swimming  You can also fit in more physical activity by taking the stairs instead of the elevator or parking farther away from stores  Include muscle strengthening activities 2 days each week  Regular exercise provides many health benefits  It helps you manage your weight, and decreases your risk for type 2 diabetes, heart disease, stroke, and high blood pressure  Exercise can also help improve your mood  Ask your healthcare provider about the best exercise plan for you  General health and safety guidelines:   · Do not smoke  Nicotine and other chemicals in cigarettes and cigars can cause lung damage  Ask your healthcare provider for information if you currently smoke and need help to quit  E-cigarettes or smokeless tobacco still contain nicotine  Talk to your healthcare provider before you use these products  · Limit alcohol  A drink of alcohol is 12 ounces of beer, 5 ounces of wine, or 1½ ounces of liquor  · Lose weight, if needed  Being overweight increases your risk of certain health conditions  These include heart disease, high blood pressure, type 2 diabetes, and certain types of cancer  · Protect your skin  Do not sunbathe or use tanning beds  Use sunscreen with a SPF 15 or higher  Apply sunscreen at least 15 minutes before you go outside  Reapply sunscreen every 2 hours  Wear protective clothing, hats, and sunglasses when you are outside  · Drive safely  Always wear your seatbelt  Make sure everyone in your car wears a seatbelt  A seatbelt can save your life if you are in an accident  Do not use your cell phone when you are driving  This could distract you and cause an accident  Pull over if you need to make a call or send a text message  · Practice safe sex  Use latex condoms if are sexually active and have more than one partner  Your healthcare provider may recommend screening tests for sexually transmitted infections (STIs)  · Wear helmets, lifejackets, and protective gear  Always wear a helmet when you ride a bike or motorcycle, go skiing, or play sports that could cause a head injury  Wear protective equipment when you play sports  Wear a lifejacket when you are on a boat or doing water sports  © Copyright [address] Information is for End User's use only and may not be sold, redistributed or otherwise used for commercial purposes  All illustrations and images included in CareNotes® are the copyrighted property of [employer] A M [employer] Inc  or [address] [place], [place] [id number]   The above information is an  only  It is not intended as medical advice for individual conditions or treatments  Talk to your doctor, nurse or pharmacist before following any medical regimen to see if it is safe and effective for you

## 2021-04-06 NOTE — PROGRESS NOTES
[address] 60    NAME: Esa Huffman  AGE: 39 y o  SEX: male  : 1975     DATE: 2021     Assessment and Plan:    we will refer to Orthopedics for his right shoulder pain  Blood work ordered  Follow-up yearly and p r n     Problem List Items Addressed This Visit        Other    Chronic right shoulder pain    Relevant Orders    Ambulatory referral to Orthopedic Surgery      Other Visit Diagnoses     Annual physical exam    -  Primary    BMI 28 0-28 9,adult        Screening for cardiovascular condition        Relevant Orders    Comprehensive metabolic panel    Lipid Panel with Direct LDL reflex          Immunizations and preventive care screenings were discussed with patient today  Appropriate education was printed on patient's after visit summary  Counseling:  Dental Health: discussed importance of regular tooth brushing, flossing, and dental visits  · Exercise: the importance of regular exercise/physical activity was discussed  Recommend exercise 3-5 times per week for at least 30 minutes  BMI Counseling: Body mass index is 28 24 kg/m²  The BMI is above normal  Nutrition recommendations include decreasing portion sizes, encouraging healthy choices of fruits and vegetables, decreasing fast food intake, consuming healthier snacks, limiting drinks that contain sugar, moderation in carbohydrate intake, increasing intake of lean protein, reducing intake of saturated and trans fat and reducing intake of cholesterol  Exercise recommendations include exercising 3-5 times per week  No pharmacotherapy was ordered  No follow-ups on file       Chief Complaint:     Chief Complaint   Patient presents with    Annual Exam    Shoulder Pain     right, had for long time, last 3 weeks worse      History of Present Illness:     Adult Annual Physical   Patient here for a comprehensive physical exam  The patient reports problems -  right shoulder pain  Diet and Physical Activity  · Diet/Nutrition: well balanced diet  · Exercise: 3-4 times a week on average  Depression Screening  PHQ-9 Depression Screening    PHQ-9:   Frequency of the following problems over the past two weeks:      Little interest or pleasure in doing things: 0 - not at all  Feeling down, depressed, or hopeless: 0 - not at all  PHQ-2 Score: 0       General Health  · Sleep: sleeps well  · Hearing: normal - bilateral   · Vision: no vision problems  · Dental: regular dental visits   Health  · Symptoms include: none     Review of Systems:     Review of Systems   Constitutional: Negative  Respiratory: Negative  Cardiovascular: Negative  Gastrointestinal: Negative  Genitourinary: Negative  Musculoskeletal: Positive for arthralgias        Past Medical History:     Past Medical History:   Diagnosis Date    Known health problems: none       Past Surgical History:     Past Surgical History:   Procedure Laterality Date    ESOPHAGOGASTRODUODENOSCOPY      LASIK        Family History:     Family History   Problem Relation Age of Onset    No Known Problems Mother     No Known Problems Father       Social History:        Social History     Socioeconomic History    Marital status: /Civil Union     Spouse name: None    Number of children: None    Years of education: None    Highest education level: None   Occupational History    None   Social Needs    Financial resource strain: None    Food insecurity     Worry: None     Inability: None    Transportation needs     Medical: None     Non-medical: None   Tobacco Use    Smoking status: Never Smoker    Smokeless tobacco: Never Used   Substance and Sexual Activity    Alcohol use: Yes     Comment: socially    Drug use: No    Sexual activity: None   Lifestyle    Physical activity     Days per week: None     Minutes per session: None    Stress: None   Relationships    Social connections     Talks on phone: None     Gets together: None     Attends Yazdanism service: None     Active member of club or organization: None     Attends meetings of clubs or organizations: None     Relationship status: None    Intimate partner violence     Fear of current or ex partner: None     Emotionally abused: None     Physically abused: None     Forced sexual activity: None   Other Topics Concern    None   Social History Narrative        Cat and dog    Always uses seat belt    Caffeine use    Living will    Religion      Current Medications:     Current Outpatient Medications   Medication Sig Dispense Refill    Multiple Vitamin (MULTI VITAMIN DAILY) TABS Take by mouth       No current facility-administered medications for this visit  Allergies:     No Known Allergies   Physical Exam:     /82   Temp 99 2 °F (37 3 °C)   Ht 5' 10" (1 778 m)   Wt 89 3 kg (196 lb 12 8 oz)   BMI 28 24 kg/m²     Physical Exam  Vitals signs reviewed  Constitutional:       General: He is not in acute distress  Appearance: Normal appearance  He is well-developed  He is not diaphoretic  HENT:      Head: Normocephalic and atraumatic  Eyes:      Conjunctiva/sclera: Conjunctivae normal    Cardiovascular:      Rate and Rhythm: Normal rate and regular rhythm  Heart sounds: Normal heart sounds  No murmur  No friction rub  No gallop  Pulmonary:      Effort: Pulmonary effort is normal  No respiratory distress  Breath sounds: Normal breath sounds  No wheezing or rales  Musculoskeletal:         General: Tenderness ( right anterior shoulder) present  Right lower leg: No edema  Left lower leg: No edema  Neurological:      General: No focal deficit present  Mental Status: He is alert and oriented to person, place, and time  Psychiatric:         Mood and Affect: Mood normal          Behavior: Behavior normal          Thought Content:  Thought content normal          Judgment: Judgment normal  [facility]  BMI Counseling: Body mass index is 28 24 kg/m²  The BMI is above normal  Nutrition recommendations include reducing portion sizes, decreasing overall calorie intake, 3-5 servings of fruits/vegetables daily, reducing fast food intake, consuming healthier snacks, decreasing soda and/or juice intake, moderation in carbohydrate intake, increasing intake of lean protein, reducing intake of saturated fat and trans fat and reducing intake of cholesterol  Exercise recommendations include exercising 3-5 times per week

## 2021-04-07 ENCOUNTER — APPOINTMENT (OUTPATIENT)
Dept: RADIOLOGY | Facility: MEDICAL CENTER | Age: 46
End: 2021-04-07
Payer: COMMERCIAL

## 2021-04-07 ENCOUNTER — OFFICE VISIT (OUTPATIENT)
Dept: OBGYN CLINIC | Facility: CLINIC | Age: 46
End: 2021-04-07
Payer: COMMERCIAL

## 2021-04-07 VITALS
WEIGHT: 196 LBS | DIASTOLIC BLOOD PRESSURE: 82 MMHG | SYSTOLIC BLOOD PRESSURE: 120 MMHG | BODY MASS INDEX: 28.06 KG/M2 | HEIGHT: 70 IN | HEART RATE: 89 BPM

## 2021-04-07 DIAGNOSIS — M25.511 CHRONIC RIGHT SHOULDER PAIN: ICD-10-CM

## 2021-04-07 DIAGNOSIS — M25.511 RIGHT SHOULDER PAIN, UNSPECIFIED CHRONICITY: ICD-10-CM

## 2021-04-07 DIAGNOSIS — G89.29 CHRONIC RIGHT SHOULDER PAIN: ICD-10-CM

## 2021-04-07 DIAGNOSIS — M75.111 INCOMPLETE ROTATOR CUFF TEAR OR RUPTURE OF RIGHT SHOULDER, NOT SPECIFIED AS TRAUMATIC: Primary | ICD-10-CM

## 2021-04-07 PROCEDURE — 3008F BODY MASS INDEX DOCD: CPT | Performed by: ORTHOPAEDIC SURGERY

## 2021-04-07 PROCEDURE — 99203 OFFICE O/P NEW LOW 30 MIN: CPT | Performed by: ORTHOPAEDIC SURGERY

## 2021-04-07 PROCEDURE — 73030 X-RAY EXAM OF SHOULDER: CPT

## 2021-04-07 PROCEDURE — 1036F TOBACCO NON-USER: CPT | Performed by: ORTHOPAEDIC SURGERY

## 2021-04-07 NOTE — PROGRESS NOTES
Chief Complaint  Right shoulder pain for many years    History Of Presenting Illness  Mily Gonzalez 1975 presents with  Right shoulder failed for many years mainly present in the anterior aspect and superior aspect of the shoulder  Patient tells me injured his right shoulder as high school student mini partially dislocated  Since then patient has been in trouble with the right shoulder on and off  Usually patient is able to work the pain out with the exercise therapy  This particular time for the last 2-3 months he has had continuous pain in the right shoulder aggravated by overhead activities decreased with rest   Patient is right handed  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  Current Medications  Current Outpatient Medications   Medication Sig Dispense Refill    Multiple Vitamin (MULTI VITAMIN DAILY) TABS Take by mouth       No current facility-administered medications for this visit          Current Problems    Active Problems:   Patient Active Problem List    Diagnosis Date Noted    Chronic right shoulder pain 04/06/2021    Pain of right thumb 11/17/2018    Sprain of interphalangeal joint of right thumb 11/17/2018    Generalized anxiety disorder 06/15/2018         Review of Systems:    General: negative for - chills, fatigue, fever,  weight gain or weight loss  Psychological: negative for - anxiety, behavioral disorder, concentration difficulties  Ophthalmic: negative for - blurry vision, decreased vision, double vision,      Past Medical History:   Past Medical History:   Diagnosis Date    Known health problems: none        Past Surgical History:   Past Surgical History:   Procedure Laterality Date    ESOPHAGOGASTRODUODENOSCOPY      LASIK         Family History:  Family history reviewed and non-contributory  Family History   Problem Relation Age of Onset    No Known Problems Mother     No Known Problems Father        Social History:  Social History     Socioeconomic History    Marital status: /Civil Union     Spouse name: None    Number of children: None    Years of education: None    Highest education level: None   Occupational History    None   Social Needs    Financial resource strain: None    Food insecurity     Worry: None     Inability: None    Transportation needs     Medical: None     Non-medical: None   Tobacco Use    Smoking status: Never Smoker    Smokeless tobacco: Never Used   Substance and Sexual Activity    Alcohol use: Yes     Comment: socially    Drug use: No    Sexual activity: None   Lifestyle    Physical activity     Days per week: None     Minutes per session: None    Stress: None   Relationships    Social connections     Talks on phone: None     Gets together: None     Attends Jewish service: None     Active member of club or organization: None     Attends meetings of clubs or organizations: None     Relationship status: None    Intimate partner violence     Fear of current or ex partner: None     Emotionally abused: None     Physically abused: None     Forced sexual activity: None   Other Topics Concern    None   Social History Narrative        Cat and dog    Always uses seat belt    Caffeine use    Living will    Jainism       Allergies:   No Known Allergies        Physical ExaminationBP 120/82   Pulse 89   Ht 5' 10" (1 778 m)   Wt 88 9 kg (196 lb)   BMI 28 12 kg/m²   Gen: Alert and oriented to person, place, time  HEENT: EOMI, eyes clear, moist mucus membranes, hearing intact      Orthopedic Exam    Cervical spine normal to examine   right shoulder well-developed musculature no obvious swelling or deformity extremely tender of the biceps tendon   forward flexion 170 AB duction 160 cuff strength is 4-5 signs of impingement positive   belly press test negative   radiographs right shoulder within normal limits          Impression   right shoulder partial cuff tear with the biceps tendinitis        Plan     discussed treatment with the patient   patient ice, do a home exercise program, use over-the-counter pain medication and start a formal physical therapy program   patient will return with an MRI arthrogram right shoulder    Santiago Amezcua MD        Portions of the record may have been created with voice recognition software  Occasional wrong word or "sound a like" substitutions may have occurred due to the inherent limitations of voice recognition software  Read the chart carefully and recognize, using context, where substitutions have occurred

## 2021-04-09 ENCOUNTER — IMMUNIZATIONS (OUTPATIENT)
Dept: FAMILY MEDICINE CLINIC | Facility: HOSPITAL | Age: 46
End: 2021-04-09

## 2021-04-09 DIAGNOSIS — Z23 ENCOUNTER FOR IMMUNIZATION: Primary | ICD-10-CM

## 2021-04-09 PROCEDURE — 91301 SARS-COV-2 / COVID-19 MRNA VACCINE (MODERNA) 100 MCG: CPT

## 2021-04-09 PROCEDURE — 0012A SARS-COV-2 / COVID-19 MRNA VACCINE (MODERNA) 100 MCG: CPT

## 2021-04-12 ENCOUNTER — EVALUATION (OUTPATIENT)
Dept: PHYSICAL THERAPY | Facility: CLINIC | Age: 46
End: 2021-04-12
Payer: COMMERCIAL

## 2021-04-12 DIAGNOSIS — G89.29 CHRONIC RIGHT SHOULDER PAIN: ICD-10-CM

## 2021-04-12 DIAGNOSIS — M75.111 INCOMPLETE ROTATOR CUFF TEAR OR RUPTURE OF RIGHT SHOULDER, NOT SPECIFIED AS TRAUMATIC: ICD-10-CM

## 2021-04-12 DIAGNOSIS — M25.511 CHRONIC RIGHT SHOULDER PAIN: ICD-10-CM

## 2021-04-12 PROCEDURE — 97140 MANUAL THERAPY 1/> REGIONS: CPT | Performed by: PHYSICAL THERAPIST

## 2021-04-12 PROCEDURE — 97162 PT EVAL MOD COMPLEX 30 MIN: CPT | Performed by: PHYSICAL THERAPIST

## 2021-04-12 PROCEDURE — 97535 SELF CARE MNGMENT TRAINING: CPT | Performed by: PHYSICAL THERAPIST

## 2021-04-12 NOTE — PROGRESS NOTES
PT Evaluation   Today's date: 2021  Patient name: Amilcar Garay  : 1975  MRN: [id number]  Referring provider: Skip Cisneros MD  Dx:   Encounter Diagnosis     ICD-10-CM    1  Incomplete rotator cuff tear or rupture of right shoulder, not specified as traumatic  M75 [id number] Ambulatory referral to Physical Therapy   2  Chronic right shoulder pain  M25 511 Ambulatory referral to Physical Therapy    G89 [id number]      Assessment  Assessment details: Patient reports right shoulder issues  Unable to elevate his right arm over his head well at all  Impairments: abnormal or restricted ROM, abnormal movement, activity intolerance, impaired physical strength, lacks appropriate home exercise program, pain with function, safety issue, scapular dyskinesis and poor body mechanics  Understanding of Dx/Px/POC: excellent   Prognosis: good    Goals  STG 2-4 weeks:   Increase UE strength by 3-6 lbs  Decrease pain by 1-2 levels on 1-10 pain scale  Increase UE PROM by 10-15 Degrees in all planes  Reduce C/O pain with lying on either side  Initiate HEP  LTG 6-8 weeks:   Increase UE strength 10-20 lbs  Demonstrate UE AROM WFL in all planes  Decrease pain to <2  Improve strength by 10-20 lbs  Return to lying on their right or left side with minimal difficulty  Improve sleep status limitations to none  D/C with HEP  Plan  Plan details: All planned modality interventions and planned therapy interventions are provided PRN    Patient would benefit from: PT eval and skilled physical therapy  Planned modality interventions: ultrasound, unattended electrical stimulation and TENS  Planned therapy interventions: joint mobilization, manual therapy, neuromuscular re-education, patient education, postural training, self care, coordination, strengthening, stretching, therapeutic activities, therapeutic exercise, therapeutic training, flexibility, transfer training, graded exercise, home exercise program, graded motor and gait training  Frequency: 3x week  Duration in weeks: 12  Treatment plan discussed with: patient        Subjective Evaluation    Pain  Quality: discomfort, knife-like, pulling, squeezing, sharp, tight and throbbing  Relieving factors: support, rest, relaxation and change in position  Aggravating factors: lifting, overhead activity and keyboarding  Progression: improved    Treatments  Current treatment: physical therapy  Patient Goals  Patient goals for therapy: decreased pain, increased motion, return to work, return to [employer] activities, independence with ADLs/IADLs and increased strength          Objective    Date of onset:  3/24/2021    Date of Surgery:  None    History of Present Episode: 4/12/2021  Wali states he has had issues with his right shoulder for a long time since he dislocated his right shoulder in high school during a practice foot ball game  He has had multiple acute episodes with his right shoulder  His right shoulder flared up again a few weeks ago  He remembers no current history of trauma or injury  Past Medical History:    4/12/2021  Wali reports chronic right shoulder issues  Some low back pain  Previous Level of Functional Ability:  4/12/2021  Wali states his right shoulder works well many times and than other times his shoulder will flare up  Inspection / Palpation:  Shoulder:  4/12/2021  Mesomorphic body type  No signs of infection  No signs of wounds  No signs of drainage  No signs of ecchymotic regions  No signs of erythremic regions  Mild to moderate signs of muscle spasm  Mild to moderate signs of muscle guarding  Mild to moderate signs of tenderness reported to palpation  No signs of atrophy noted  No signs of swelling  No signs of a surgery site  Current conditions appears consistent with recent acute episode  Chief Complaints:  4/12/2021  Wali reports moderate to severe difficulty with bending his right shoulder    Wali reports moderate to severe difficulty with movement of his right shoulder  Wali reports moderate to severe difficulty with use of his right arm  Wali reports severe difficulty with lifting / elevating his right arm  Wali reports moderate to severe difficulty with sleeping  Wali reports moderate difficulty with his strength and endurance  Wali reports moderate limitations with his right shoulder range of motion  Wali reports moderate difficulty lying on his right shoulder region      SHOULDER PAIN Resting Palpation Moving Lifting Elevating   4/12/2021 Lt 0 0 0 0 0   4/12/2021 Rt  0-2 0-4 2-6 2-5 5-8     SHOULDER PAIN Sleeping Throwing Pushing Pulling Twisting   4/12/2021 Lt 0 0 0 0 0   4/12/2021 Rt  0-5 8-10 2-6 2-6 2-6     SHOULDER AROM Flexion Extension Abduction   4/12/2021 Lt 185° 70° 185°   4/12/2021 Rt 102° 45° 102°     SHOULDER AROM Hor Add Ext Rotation Int Rotation   4/12/2021 Lt 70° 95° 95°   4/12/2021 Rt 42° 75° 95°     SHLD MMT / PAIN Flexion Extension Abduction   4/12/2021 Lt 0/10  75 lbs 0/10  70 lbs 0/10  68 lbs   4/12/2021 Rt 4/10  24 lbs 4/10  31 lbs 4/10  21 lbs     SHLD MMT / PAIN Hor Add Ext Rotation Int Rotation   4/12/2021 Lt 0/10  70 lbs 0/10  65 lbs 0/10  72 lbs   4/12/2021 Rt 4/10  25 lbs 4/10  20 lbs 4/10  25 lbs     Shoulder Screen Rotator Cuff Apprehension Anterior  Stability Posterior  Stability   4/12/2021 Rt POSITIVE Negative Negative Negative   4/12/2021 Lt Negative Negative Negative Negative     Shoulder Screen AC Joint SC Joint Drop Arm Adhesive Capsulitis   4/12/2021 Rt Negative Negative POSITIVE POSITIVE   4/12/2021 Lt Negative Negative Negative Negative     Precautions:  Right Shoulder Issues / Rotator Cuff Issues    All treatments below will be provided with a focus on strengthening, flexibility, ROM, postural,   endurance and any possible swelling and pain which may be present without ignoring   neural issues involving balance, coordination and proprioception which is also important   and necessary to provide full functional mobility and quality care        Daily Treatment Log  Manual  4/12       MT, ROM 30'       HEP 15'       Neuro Re-Ed        [facility]        FWC-Codmans        FWC-Curls,Tri        Power Web        Digiflex        Finger Ladder        Ther Exer        Kimberly-BP,PD,Lats,Row        NuStep        W/P-PNF,IR,ER,PU,PS,Throw-Top,Mid,Bot        W/P-OH        [employer] Sup/Pro        [facility] [place], [place] 75'               Modalities 4/12       Aida  / [place] / WARD        US

## 2021-04-12 NOTE — LETTER
2021  PT Evaluation Plan of Care  Janes Gan MD  [facility]  [address]    Patient: Kaci Perea   YOB: 1975   Date of Visit: 2021     Encounter Diagnosis     ICD-10-CM    1  Incomplete rotator cuff tear or rupture of right shoulder, not specified as traumatic  M75 111 Ambulatory referral to Physical Therapy   2  Chronic right shoulder pain  M25 511 Ambulatory referral to Physical Therapy    G89 29      Dear Dr Griselda Bah:  Thank you for your recent referral of Wali Newman  Please review the attached evaluation summary from Wali's recent visit  Please verify that you agree with the plan of care by signing the attached order  If you have any questions or concerns, please do not hesitate to call  I sincerely appreciate the opportunity to share in the care of one of your patients and hope to have another opportunity to work with you in the near future  Sincerely,    Immanuel Suarez, PT    Referring Provider:      I certify that I have read the below Plan of Care and certify the need for these services furnished under this plan of treatment while under my care  Janes Gan MD  [address] [place] [address]  Via In Basket    Please SIGN ABOVE and return THIS PAGE ONLY to Fax # [phone]        PT Evaluation   Today's date: 2021  Patient name: Kaci Perea  : 1975  MRN: [id number]  Referring provider: Mamta Flores MD  Dx:   Encounter Diagnosis     ICD-10-CM    1  Incomplete rotator cuff tear or rupture of right shoulder, not specified as traumatic  M75 111 Ambulatory referral to Physical Therapy   2  Chronic right shoulder pain  M25 511 Ambulatory referral to Physical Therapy    G89 29      Assessment  Assessment details: Patient reports right shoulder issues  Unable to elevate his right arm over his head well at all    Impairments: abnormal or restricted ROM, abnormal movement, activity intolerance, impaired physical strength, lacks appropriate home exercise program, pain with function, safety issue, scapular dyskinesis and poor body mechanics  Understanding of Dx/Px/POC: excellent   Prognosis: good    Goals  STG 2-4 weeks:   Increase UE strength by 3-6 lbs  Decrease pain by 1-2 levels on 1-10 pain scale  Increase UE PROM by 10-15 Degrees in all planes  Reduce C/O pain with lying on either side  Initiate HEP  LTG 6-8 weeks:   Increase UE strength 10-20 lbs  Demonstrate UE AROM WFL in all planes  Decrease pain to <2  Improve strength by 10-20 lbs  Return to lying on their right or left side with minimal difficulty  Improve sleep status limitations to none  D/C with HEP  Plan  Plan details: All planned modality interventions and planned therapy interventions are provided PRN    Patient would benefit from: PT eval and skilled physical therapy  Planned modality interventions: ultrasound, unattended electrical stimulation and TENS  Planned therapy interventions: joint mobilization, manual therapy, neuromuscular re-education, patient education, postural training, self care, coordination, strengthening, stretching, therapeutic activities, therapeutic exercise, therapeutic training, flexibility, transfer training, graded exercise, home exercise program, graded motor and gait training  Frequency: 3x week  Duration in weeks: 12  Treatment plan discussed with: patient        Subjective Evaluation    Pain  Quality: discomfort, knife-like, pulling, squeezing, sharp, tight and throbbing  Relieving factors: support, rest, relaxation and change in position  Aggravating factors: lifting, overhead activity and keyboarding  Progression: improved    Treatments  Current treatment: physical therapy  Patient Goals  Patient goals for therapy: decreased pain, increased motion, return to work, return to [employer] activities, independence with ADLs/IADLs and increased strength          Objective    Date of onset:  3/24/2021    Date of Surgery: None    History of Present Episode: 4/12/2021  Pam Cisneros states he has had issues with his right shoulder for a long time since he dislocated his right shoulder in high school during a practice foot ball game  He has had multiple acute episodes with his right shoulder  His right shoulder flared up again a few weeks ago  He remembers no current history of trauma or injury  Past Medical History:    4/12/2021  Wali reports chronic right shoulder issues  Some low back pain  Previous Level of Functional Ability:  4/12/2021  Wali states his right shoulder works well many times and than other times his shoulder will flare up  Inspection / Palpation:  Shoulder:  4/12/2021  Mesomorphic body type  No signs of infection  No signs of wounds  No signs of drainage  No signs of ecchymotic regions  No signs of erythremic regions  Mild to moderate signs of muscle spasm  Mild to moderate signs of muscle guarding  Mild to moderate signs of tenderness reported to palpation  No signs of atrophy noted  No signs of swelling  No signs of a surgery site  Current conditions appears consistent with recent acute episode  Chief Complaints:  4/12/2021  Wali reports moderate to severe difficulty with bending his right shoulder  Wali reports moderate to severe difficulty with movement of his right shoulder  Wali reports moderate to severe difficulty with use of his right arm  Wali reports severe difficulty with lifting / elevating his right arm  Wali reports moderate to severe difficulty with sleeping  Wali reports moderate difficulty with his strength and endurance  Wali reports moderate limitations with his right shoulder range of motion  Wali reports moderate difficulty lying on his right shoulder region      SHOULDER PAIN Resting Palpation Moving Lifting Elevating   4/12/2021 Lt 0 0 0 0 0   4/12/2021 Rt  0-2 0-4 2-6 2-5 5-8     SHOULDER PAIN Sleeping Throwing Pushing Pulling Twisting   4/12/2021 Lt 0 0 0 0 0   4/12/2021 Rt  0-5 8-10 2-6 2-6 2-6     SHOULDER AROM Flexion Extension Abduction   4/12/2021 Lt 185° 70° 185°   4/12/2021 Rt 102° 45° 102°     SHOULDER AROM Hor Add Ext Rotation Int Rotation   4/12/2021 Lt 70° 95° 95°   4/12/2021 Rt 42° 75° 95°     SHLD MMT / PAIN Flexion Extension Abduction   4/12/2021 Lt 0/10  75 lbs 0/10  70 lbs 0/10  68 lbs   4/12/2021 Rt 4/10  24 lbs 4/10  31 lbs 4/10  21 lbs     SHLD MMT / PAIN Hor Add Ext Rotation Int Rotation   4/12/2021 Lt 0/10  70 lbs 0/10  65 lbs 0/10  72 lbs   4/12/2021 Rt 4/10  25 lbs 4/10  20 lbs 4/10  25 lbs     Shoulder Screen Rotator Cuff Apprehension Anterior  Stability Posterior  Stability   4/12/2021 Rt POSITIVE Negative Negative Negative   4/12/2021 Lt Negative Negative Negative Negative     Shoulder Screen AC Joint SC Joint Drop Arm Adhesive Capsulitis   4/12/2021 Rt Negative Negative POSITIVE POSITIVE   4/12/2021 Lt Negative Negative Negative Negative     Precautions:  Right Shoulder Issues / Rotator Cuff Issues    All treatments below will be provided with a focus on strengthening, flexibility, ROM, postural,   endurance and any possible swelling and pain which may be present without ignoring   neural issues involving balance, coordination and proprioception which is also important   and necessary to provide full functional mobility and quality care        Daily Treatment Log  Manual  4/12       MT, ROM 30'       HEP 15'       Neuro Re-Ed        [facility]        FW-Codmans        [facility],Tri        Power Web        Digiflex        Finger Ladder        Ther Exer        Kimberly-BP,PD,Lats,Row        NuStep        W/P-PNF,IR,ER,PU,PS,Throw-Top,Mid,Bot        W/P-OH        [employer] Sup/Pro        [facility] T[employer] Dashboard [place], [place] 65'               Modalities 4/12       [facility] / [place] / [facility]

## 2021-04-13 ENCOUNTER — TELEPHONE (OUTPATIENT)
Dept: OBGYN CLINIC | Facility: MEDICAL CENTER | Age: 46
End: 2021-04-13

## 2021-04-13 NOTE — TELEPHONE ENCOUNTER
Patient calling to request the aurthorization for an open MRI  He wants to go to [facility] in [place]  Please begin process    Once approved please fax to [phone] # [phone]

## 2021-04-13 NOTE — TELEPHONE ENCOUNTER
Tahir Lane sent a message to patient through My Chart  Patient contacted out office through My Chart

## 2021-04-15 ENCOUNTER — TELEPHONE (OUTPATIENT)
Dept: OBGYN CLINIC | Facility: MEDICAL CENTER | Age: 46
End: 2021-04-15

## 2021-04-15 ENCOUNTER — OFFICE VISIT (OUTPATIENT)
Dept: PHYSICAL THERAPY | Facility: CLINIC | Age: 46
End: 2021-04-15
Payer: COMMERCIAL

## 2021-04-15 ENCOUNTER — TELEPHONE (OUTPATIENT)
Dept: INTERVENTIONAL RADIOLOGY/VASCULAR | Facility: HOSPITAL | Age: 46
End: 2021-04-15

## 2021-04-15 DIAGNOSIS — M25.511 CHRONIC RIGHT SHOULDER PAIN: ICD-10-CM

## 2021-04-15 DIAGNOSIS — M75.122 COMPLETE TEAR OF LEFT ROTATOR CUFF, UNSPECIFIED WHETHER TRAUMATIC: Primary | ICD-10-CM

## 2021-04-15 DIAGNOSIS — M75.111 INCOMPLETE ROTATOR CUFF TEAR OR RUPTURE OF RIGHT SHOULDER, NOT SPECIFIED AS TRAUMATIC: Primary | ICD-10-CM

## 2021-04-15 DIAGNOSIS — G89.29 CHRONIC RIGHT SHOULDER PAIN: ICD-10-CM

## 2021-04-15 PROCEDURE — 97112 NEUROMUSCULAR REEDUCATION: CPT

## 2021-04-15 PROCEDURE — 97110 THERAPEUTIC EXERCISES: CPT

## 2021-04-15 PROCEDURE — 97140 MANUAL THERAPY 1/> REGIONS: CPT

## 2021-04-15 NOTE — PROGRESS NOTES
Daily Note     Today's date: 4/15/2021  Patient name: Paz Martinez  : 1975  MRN: [id number]  Referring provider: Sera Carrillo MD  Dx:   Encounter Diagnosis     ICD-10-CM    1  Incomplete rotator cuff tear or rupture of right shoulder, not specified as traumatic  M75 111    2  Chronic right shoulder pain  M25 511     G89 29                   Subjective: Pt reports no change in symptoms since LV  Has been compliant with HEP, but has been more challenged with these exercises than he expected  Objective: See treatment diary below  Educated pt on DOMS and the possibility of increased soreness tonight/tomorrow, but should begin to resolve within 1-2 days  Advised pt to utilize ice for 15-20 minutes at a time to manage symptoms  Pt verbally acknowledged understanding of all that was discussed  Assessment: Tolerated treatment well  Initiated program as outlined below  Was challenged with exercises started today, but was able to complete all assigned reps/sets  Slight to moderate soft tissue restriction noted along R proximal bicep tendon and bicep muscle belly  Slight increased soreness noted at end of session, but tolerable  Patient would benefit from continued PT to further improve strength, increase available ROM, and maximize overall function  Plan: Continue per plan of care  Monitor response to initial treatment NV  Precautions:  Right Shoulder Issues / Rotator Cuff Issues    All treatments below will be provided with a focus on strengthening, flexibility, ROM, postural,   endurance and any possible swelling and pain which may be present without ignoring   neural issues involving balance, coordination and proprioception which is also important   and necessary to provide full functional mobility and quality care        Daily Treatment Log  Manual  4/12 4/15      MT, ROM 30' 15'      HEP 15'       Neuro Re-Ed        UBC  10' L2,  Alt every 2'      [facility]        Power Web        Digiflex        Finger Ladder        Ther Exer        Kimberly-BP,PD,Lats,Row        NuStep        W/P-PNF,IR,ER,PU,PS,Throw-Top,Mid,Bot  15#   30x ea Top/Mid;  PNF, PU, PS      W/P-OH  10'      Rotation Bar Sup/Pro        E  I  [employer]  cw/ccw  30x ea      [place], PE 15'               Modalities 4/12 4/15      [address] / PE / ES  Deferred CP to home      US

## 2021-04-15 NOTE — TELEPHONE ENCOUNTER
Patient sees Dr Jonathon Camacho Side at [address] [facility] asking for the scripts for the MRI on right shoulder to be faxed to [phone]

## 2021-04-16 ENCOUNTER — TELEPHONE (OUTPATIENT)
Dept: OBGYN CLINIC | Facility: MEDICAL CENTER | Age: 46
End: 2021-04-16

## 2021-04-16 NOTE — TELEPHONE ENCOUNTER
Patient sees Dr Michelle Sexton at [place] [address] about the MRI order  Is the new one for right shoulder wo contrast replacing the arthrogram on 4/7/2021?      Please call Adonay Pereyra at [phone]

## 2021-04-19 ENCOUNTER — OFFICE VISIT (OUTPATIENT)
Dept: PHYSICAL THERAPY | Facility: CLINIC | Age: 46
End: 2021-04-19
Payer: COMMERCIAL

## 2021-04-19 DIAGNOSIS — M25.511 CHRONIC RIGHT SHOULDER PAIN: ICD-10-CM

## 2021-04-19 DIAGNOSIS — M75.111 INCOMPLETE ROTATOR CUFF TEAR OR RUPTURE OF RIGHT SHOULDER, NOT SPECIFIED AS TRAUMATIC: Primary | ICD-10-CM

## 2021-04-19 DIAGNOSIS — G89.29 CHRONIC RIGHT SHOULDER PAIN: ICD-10-CM

## 2021-04-19 PROCEDURE — 97110 THERAPEUTIC EXERCISES: CPT | Performed by: PHYSICAL THERAPIST

## 2021-04-19 PROCEDURE — 97140 MANUAL THERAPY 1/> REGIONS: CPT | Performed by: PHYSICAL THERAPIST

## 2021-04-19 PROCEDURE — 97112 NEUROMUSCULAR REEDUCATION: CPT | Performed by: PHYSICAL THERAPIST

## 2021-04-19 NOTE — PROGRESS NOTES
Daily Note     Today's date: 2021  Patient name: Ml Canela  : 1975  MRN: [id number]  Referring provider: Tim Amezcua MD  Dx:   Encounter Diagnosis     ICD-10-CM    1  Incomplete rotator cuff tear or rupture of right shoulder, not specified as traumatic  M75 111    2  Chronic right shoulder pain  M25 511     G89 29                   Subjective: His arm is feeling a little better  Objective: See treatment diary below      Assessment: Tolerated treatment well  Patient exhibited good technique with therapeutic exercises and would benefit from continued PT      Plan: Continue per plan of care  Progress treatment as tolerated  Precautions:  Right Shoulder Issues / Rotator Cuff Issues    All treatments below will be provided with a focus on strengthening, flexibility, ROM, postural,   endurance and any possible swelling and pain which may be present without ignoring   neural issues involving balance, coordination and proprioception which is also important   and necessary to provide full functional mobility and quality care        Daily Treatment Log  Manual  4/12 4/15 4/19     MT, ROM 30' 15' 25'     HEP 15'       Neuro Re-Ed        UBC  10' L2,  Alt every 2' 10' L2     [facility]        [facility]        Power Web        Digiflex        Finger Ladder        Ther Exer        Kimberly-BP,PD,Lats,Row        NuStep   10' L5     W/P-PNF,IR,ER,PU,PS,Throw-Top,Mid,Bot  15#   30x ea Top/Mid;  PNF, PU, PS 15#-30x     W/P-OH  10' 10'x2     Rotation Bar Sup/Pro        E  I  [employer]  cw/ccw  30x ea      [place], [place] 57'  15'             Modalities 4/12 4/15 4/19     MH / PE / ES  Deferred CP to home      US

## 2021-04-19 NOTE — PROGRESS NOTES
Daily Note     Today's date: 2021  Patient name: Vero Burgess  : 1975  MRN: [id number]  Referring provider: Erin Johnson MD  Dx:   Encounter Diagnosis     ICD-10-CM    1  Incomplete rotator cuff tear or rupture of right shoulder, not specified as traumatic  M75 111    2  Chronic right shoulder pain  M25 511     G89 29                   Subjective: No new c/o pain today  Objective: See treatment diary below      Assessment: Tolerated treatment well  Patient exhibited good technique with therapeutic exercises and would benefit from continued PT to increase R shoulder ROM/strength and endurance to improve mobility  Plan: Continue per plan of care  Progress treatment as tolerated  Precautions:  Right Shoulder Issues / Rotator Cuff Issues    All treatments below will be provided with a focus on strengthening, flexibility, ROM, postural,   endurance and any possible swelling and pain which may be present without ignoring   neural issues involving balance, coordination and proprioception which is also important   and necessary to provide full functional mobility and quality care        Daily Treatment Log  Manual  4/12 4/15 4/19     MT, ROM 30' 15'      HEP 15'       Neuro Re-Ed        UBC  10' L2,  Alt every 2'      FWC-Codmans        [facility]-Curls,Tri        Power Web        Digiflex        Finger Ladder        Ther Exer        Kimberly-BP,PD,Lats,Row        NuStep        W/P-PNF,IR,ER,PU,PS,Throw-Top,Mid,Bot  15#   30x ea Top/Mid;  PNF, PU, PS      W/P-OH  10'      Rotation Bar Sup/Pro        E  I  [employer]  cw/ccw  30x ea      [place], [place] 26'  15'             Modalities 4/12 4/15 4/19     MH / PE / ES  Deferred CP to home      US

## 2021-04-23 ENCOUNTER — APPOINTMENT (OUTPATIENT)
Dept: PHYSICAL THERAPY | Facility: CLINIC | Age: 46
End: 2021-04-23
Payer: COMMERCIAL

## 2021-04-23 NOTE — PROGRESS NOTES
Daily Note     Today's date: 2021  Patient name: Aaron De León  : 1975  MRN: [id number]  Referring provider: Oneida Hernandez MD  Dx:   Encounter Diagnosis     ICD-10-CM    1  Incomplete rotator cuff tear or rupture of right shoulder, not specified as traumatic  M75 111    2  Chronic right shoulder pain  M25 511     G89 29                   Subjective: ***      Objective: See treatment diary below      Assessment: Tolerated treatment well  Patient exhibited good technique with therapeutic exercises and would benefit from continued PT      Plan: Continue per plan of care  Progress treatment as tolerated  Precautions:  Right Shoulder Issues / Rotator Cuff Issues    All treatments below will be provided with a focus on strengthening, flexibility, ROM, postural,   endurance and any possible swelling and pain which may be present without ignoring   neural issues involving balance, coordination and proprioception which is also important   and necessary to provide full functional mobility and quality care        Daily Treatment Log  Manual  4/12 4/15 4/19 4/23    MT, ROM 30' 15' 25'     HEP 15'       Neuro Re-Ed       UBC  10' L2,  Alt every 2' 10' L2     [facility]        [facility]        Power Web        Digiflex        Finger Ladder        Ther Exer       Kimberly-BP,PD,Lats,Row        NuStep   10' L5     W/P-PNF,IR,ER,PU,PS,Throw-Top,Mid,Bot  15#   30x ea Top/Mid;  PNF, PU, PS 15#-30x     W/P-OH  10' 10'x2     Rotation Bar Sup/Pro        E  I  [employer]  cw/ccw  30x ea      [place] 68'  15'             Modalities 4/12 4/15 4/19 4/23    MH / PE / ES  Deferred CP to home      US

## 2021-04-27 ENCOUNTER — OFFICE VISIT (OUTPATIENT)
Dept: PHYSICAL THERAPY | Facility: CLINIC | Age: 46
End: 2021-04-27
Payer: COMMERCIAL

## 2021-04-27 DIAGNOSIS — M25.511 CHRONIC RIGHT SHOULDER PAIN: ICD-10-CM

## 2021-04-27 DIAGNOSIS — G89.29 CHRONIC RIGHT SHOULDER PAIN: ICD-10-CM

## 2021-04-27 DIAGNOSIS — M75.111 INCOMPLETE ROTATOR CUFF TEAR OR RUPTURE OF RIGHT SHOULDER, NOT SPECIFIED AS TRAUMATIC: Primary | ICD-10-CM

## 2021-04-27 PROCEDURE — 97112 NEUROMUSCULAR REEDUCATION: CPT

## 2021-04-27 PROCEDURE — 97140 MANUAL THERAPY 1/> REGIONS: CPT

## 2021-04-27 PROCEDURE — 97110 THERAPEUTIC EXERCISES: CPT

## 2021-04-27 NOTE — PROGRESS NOTES
Daily Note     Today's date: 2021  Patient name: Tye Carrillo  : 1975  MRN: [id number]  Referring provider: Елена Lagunas MD  Dx:   Encounter Diagnosis     ICD-10-CM    1  Incomplete rotator cuff tear or rupture of right shoulder, not specified as traumatic  M75 111    2  Chronic right shoulder pain  M25 511     G89 29                   Subjective: Pt states he continues to have pain with anything over head  Objective: See treatment diary below  Assessment: Tolerated treatment well with no increased in pain at the end of his PT session  Pt was educated on proper as he preformed scapular stabilization exercises today  Pt is limited with  ROM seen with manual  stretching  Patient would benefit from continued PT      Plan: Continue per plan of care  Progress treatment as tolerated  Precautions:  Right Shoulder Issues / Rotator Cuff Issues    All treatments below will be provided with a focus on strengthening, flexibility, ROM, postural,   endurance and any possible swelling and pain which may be present without ignoring   neural issues involving balance, coordination and proprioception which is also important   and necessary to provide full functional mobility and quality care        Daily Treatment Log  Manual  4/12 4/15 4/19 4/27     MT, ROM 30' 15' 25' CF    HEP 15'       Neuro Re-Ed        UBC  10' L2,  Alt every 2' 10' L2 10' L2    [facility]        [facility]        Power Web        Digiflex        Finger Ladder        Ther Exer        Kimberly-BP,PD,Lats,Row    3 x 10 ea     NuStep   10' L5 np    W/P-PNF,IR,ER,PU,PS,Throw-Top,Mid,Bot  15#   30x ea Top/Mid;  PNF, PU, PS 15#-30x 15# 30x     W/P-OH  10' 10'x2 5'     Rotation Bar Sup/Pro        [place] [employer]  cw/ccw  30x ea      [place] 32'  15'             Modalities 4/12 4/15 4/19     MH / PE / ES  Deferred CP to home      US

## 2021-04-28 ENCOUNTER — TELEPHONE (OUTPATIENT)
Dept: OBGYN CLINIC | Facility: MEDICAL CENTER | Age: 46
End: 2021-04-28

## 2021-04-28 NOTE — TELEPHONE ENCOUNTER
Patient sees Dr Marc Pradhan at [address] [facility] calling for referral # [id number] to be refaxed to them for the arthrogram     Fax # [phone]

## 2021-04-28 NOTE — TELEPHONE ENCOUNTER
Ericka Gold is calling back in from [facility] stating that she still has yet to receive the fax and is asking if this can be faxed over to them as soon as possible to [phone] since the patient is with them currently

## 2021-05-04 ENCOUNTER — OFFICE VISIT (OUTPATIENT)
Dept: OBGYN CLINIC | Facility: CLINIC | Age: 46
End: 2021-05-04
Payer: COMMERCIAL

## 2021-05-04 VITALS
WEIGHT: 196 LBS | DIASTOLIC BLOOD PRESSURE: 74 MMHG | HEART RATE: 62 BPM | SYSTOLIC BLOOD PRESSURE: 114 MMHG | BODY MASS INDEX: 28.06 KG/M2 | HEIGHT: 70 IN

## 2021-05-04 DIAGNOSIS — M75.121 COMPLETE TEAR OF RIGHT ROTATOR CUFF, UNSPECIFIED WHETHER TRAUMATIC: Primary | ICD-10-CM

## 2021-05-04 PROCEDURE — 99213 OFFICE O/P EST LOW 20 MIN: CPT | Performed by: ORTHOPAEDIC SURGERY

## 2021-05-04 PROCEDURE — 3008F BODY MASS INDEX DOCD: CPT | Performed by: ORTHOPAEDIC SURGERY

## 2021-05-04 NOTE — PATIENT INSTRUCTIONS
Rotator Cuff Tear Repair   WHAT YOU NEED TO KNOW:   Rotator cuff repair is surgery to fix a tear in one or more of your rotator cuff tendons  A tendon is a cord of tough tissue that connects your muscles to your bones  The rotator cuff is made up of a group of muscles and tendons that hold the shoulder joint in place  DISCHARGE INSTRUCTIONS:   Medicines:   · Antibiotics: This medicine is given to fight or prevent an infection caused by bacteria  Always take your antibiotics exactly as ordered by your healthcare provider  Do not stop taking your medicine unless directed by your healthcare provider  Never save antibiotics or take leftover antibiotics that were given to you for another illness  · Pain medicine: You may be given medicine to take away or decrease pain  Do not wait until the pain is severe before you take your medicine  · NSAIDs , such as ibuprofen, help decrease swelling, pain, and fever  This medicine is available with or without a doctor's order  NSAIDs can cause stomach bleeding or kidney problems in certain people  If you take blood thinner medicine, always ask your healthcare provider if NSAIDs are safe for you  Always read the medicine label and follow directions  · Take your medicine as directed  Contact your healthcare provider if you think your medicine is not helping or if you have side effects  Tell him or her if you are allergic to any medicine  Keep a list of the medicines, vitamins, and herbs you take  Include the amounts, and when and why you take them  Bring the list or the pill bottles to follow-up visits  Carry your medicine list with you in case of an emergency  Follow up with your healthcare provider as directed: Ask when you should return to have your stitches taken out  Write down your questions so you remember to ask them during your visits  Care for your wound:  Keep your shoulder wound clean and dry  Ask how to care for the wound, and if you may get it wet     Ice your shoulder: Ice may decrease pain, swelling, and muscle spasms  Use an ice pack, or put crushed ice in a plastic bag  Cover it with a towel and place it on your shoulder for 15 to 20 minutes every hour or as directed  Use a sling: You may need to use an abduction immobilizer sling for 4 to 6 weeks after surgery  This sling stops your arm from moving  The pillow attached to the sling holds your arm away from your body  This position decreases pressure on your wound, and helps blood flow to the surgery area, which may help the wound heal    Physical therapy:   · Your physical therapy may begin soon after surgery  At first, a physical therapist will work with you to do safe exercises that will allow your rotator cuff to heal  Do not use your arm to lift anything  Do not use your arm to move around, push yourself up from lying down, or to change positions  After a few weeks, the therapist may suggest therapy in the pool  The water allows you to move your arm with very little stress on your shoulder  · Over 2 to 3 months, you will do exercises that include using your shoulder more  You will begin exercises such as raising and stretching your arm  Do only those exercises that you have been told to do, and only as often as you are told to do them  Over time, you will begin doing exercises that make your shoulder muscles stronger  After 4 to 6 months, you may be able to begin activities that require you to lift your arm overhead, such as tennis and other racquet sports  It may take up to a year to return to all of your regular daily activities after you have a rotator cuff tear repair  Contact your healthcare provider if:   · Your surgery area is swollen, red, or has pus coming from it  · You have chills, a cough, or feel weak and achy  · You have a fever  · You have stiffness or pain in your shoulder that is worse and making you stop your physical therapy  · You are vomiting      · Your skin is itchy, swollen, or has a rash  · You have questions or concerns about your condition or care  Seek care immediately or call 911 if:   · You suddenly have shortness of breath  · The stitches on your shoulder wound come apart  · You have new shoulder pain that does not go away, even after you take pain medicine  · You have sudden numbness or tingling down your arm  © 2017 [employer] Information is for End User's use only and may not be sold, redistributed or otherwise used for commercial purposes  All illustrations and images included in CareNotes® are the copyrighted property of A D A M , Inc  or Samuel Paez  The above information is an  only  It is not intended as medical advice for individual conditions or treatments  Talk to your doctor, nurse or pharmacist before following any medical regimen to see if it is safe and effective for you

## 2021-05-04 NOTE — PROGRESS NOTES
Chief Complaint  Right shoulder pain    History Of Presenting Illness  Mejia Zapata 1975 presents with  Right shoulder pain for many years  Patient has a pain in his high school  Patient presents for evaluation with an MRI arthro Gram   Patient has started physical therapy and be discharged to home exercise program   Patient has aching discomfort in the right shoulder aggravated by overhead activities  Patient is right handed fairly active  His job is mainly a desk job      Current Medications  Current Outpatient Medications   Medication Sig Dispense Refill    Multiple Vitamin (MULTI VITAMIN DAILY) TABS Take by mouth       No current facility-administered medications for this visit          Current Problems    Active Problems:   Patient Active Problem List    Diagnosis Date Noted    Chronic right shoulder pain 04/06/2021    Pain of right thumb 11/17/2018    Sprain of interphalangeal joint of right thumb 11/17/2018    Generalized anxiety disorder 06/15/2018         Review of Systems:    General: negative for - chills, fatigue, fever,  weight gain or weight loss  Psychological: negative for - anxiety, behavioral disorder, concentration difficulties  Ophthalmic: negative for - blurry vision, decreased vision, double vision,      Past Medical History:   Past Medical History:   Diagnosis Date    Known health problems: none        Past Surgical History:   Past Surgical History:   Procedure Laterality Date    ESOPHAGOGASTRODUODENOSCOPY      LASIK         Family History:  Family history reviewed and non-contributory  Family History   Problem Relation Age of Onset    No Known Problems Mother     No Known Problems Father        Social History:  Social History     Socioeconomic History    Marital status: /Civil Union     Spouse name: None    Number of children: None    Years of education: None    Highest education level: None   Occupational History    None   Social Needs    Financial resource strain: None    Food insecurity     Worry: None     Inability: None    Transportation needs     Medical: None     Non-medical: None   Tobacco Use    Smoking status: Never Smoker    Smokeless tobacco: Never Used   Substance and Sexual Activity    Alcohol use: Yes     Comment: socially    Drug use: No    Sexual activity: None   Lifestyle    Physical activity     Days per week: None     Minutes per session: None    Stress: None   Relationships    Social connections     Talks on phone: None     Gets together: None     Attends [employer] service: None     Active member of club or organization: None     Attends meetings of clubs or organizations: None     Relationship status: None    Intimate partner violence     Fear of current or ex partner: None     Emotionally abused: None     Physically abused: None     Forced sexual activity: None   Other Topics Concern    None   Social History Narrative        Cat and dog    Always uses seat belt    Caffeine use    Living will    Scientologist       Allergies:   No Known Allergies        Physical ExaminationBP 114/74   Pulse 62   Ht 5' 10" (1 778 m)   Wt 88 9 kg (196 lb)   BMI 28 12 kg/m²   Gen: Alert and oriented to person, place, time  HEENT: EOMI, eyes clear, moist mucus membranes, hearing intact      Orthopedic Exam   right shoulder no swelling or deformity excellent range of motion cuff strength is 4/5   signs of impingement positive   MRI shows small supraspinatus tendon tear complete with no retraction          Impression   right shoulder rotator cuff tear symptomatic      Plan     discussed treatment with the patient, my recommendation is surgical repair as patient is fairly active and right handed   patient would like to think about his options and let me know by the end of summer he wants to go ahead with surgery   patient come back sooner if the shoulder becomes most symptomatic    Lorrie Morin MD        Portions of the record may have been created with voice recognition software  Occasional wrong word or "sound a like" substitutions may have occurred due to the inherent limitations of voice recognition software  Read the chart carefully and recognize, using context, where substitutions have occurred

## 2021-05-12 NOTE — PROGRESS NOTES
PT Discharge  Today's date: 2021  Patient name: Gregorio Zamora  : 1975  MRN: [id number]  Referring provider: John Vu MD  Dx:   Encounter Diagnosis     ICD-10-CM    1  Incomplete rotator cuff tear or rupture of right shoulder, not specified as traumatic  M75 111    2  Chronic right shoulder pain  M25 511     G89 29      Assessment/Plan    Subjective    Objective     2021  Gregorio Zamora has not returned for more treatment  Wali Newman did not attend today's appointment  I cannot provide you with a current progress report but I can provide you with information based on previous performance  Gregorio Zamora is discharged at this time

## 2021-05-24 ENCOUNTER — OFFICE VISIT (OUTPATIENT)
Dept: OBGYN CLINIC | Facility: OTHER | Age: 46
End: 2021-05-24
Payer: COMMERCIAL

## 2021-05-24 VITALS
DIASTOLIC BLOOD PRESSURE: 74 MMHG | BODY MASS INDEX: 27.12 KG/M2 | SYSTOLIC BLOOD PRESSURE: 121 MMHG | WEIGHT: 189 LBS | HEART RATE: 64 BPM

## 2021-05-24 DIAGNOSIS — M75.101 TEAR OF RIGHT SUPRASPINATUS TENDON: Primary | ICD-10-CM

## 2021-05-24 PROCEDURE — 99214 OFFICE O/P EST MOD 30 MIN: CPT | Performed by: ORTHOPAEDIC SURGERY

## 2021-05-24 PROCEDURE — 1036F TOBACCO NON-USER: CPT | Performed by: ORTHOPAEDIC SURGERY

## 2021-05-24 RX ORDER — CHLORHEXIDINE GLUCONATE 0.12 MG/ML
15 RINSE ORAL ONCE
Status: CANCELLED | OUTPATIENT
Start: 2021-05-24 | End: 2021-05-24

## 2021-05-24 NOTE — PATIENT INSTRUCTIONS
You are being scheduled for a shoulder arthroscopy to treat your symptoms  Below are some instructions and information on what to expect before and after your surgery  Pre-Surgical Preparation for Arthroscopic Shoulder Surgery: You will be contacted the evening prior to your surgery to confirm the scheduled time of the procedure and when to arrive at the hospital    Do not eat or drink anything after midnight the night before your surgery  Since you are having out-patient surgery, make sure that you have someone who can drive you home later in the day  Also, prepare that person for a long day, as the process of safely preparing for and recovering from the procedure is more time consuming than the actual procedure! As you will be in a sling after surgery, please wear or bring a loose fitting button-down shirt so that you can easily place this over the sling when you leave the surgical suite  This avoids having to place the operative arm in a sleeve  Most patients find that this is the easiest outfit to wear for the first week or so after surgery so you may want to plan accordingly  Most patients find that lying down in bed after shoulder surgery accentuates their discomfort  This is likely related to the effect of gravity on the swelling in the shoulder  As a result, most patients sleep better in a recliner or in bed with pillows propped up behind their back for the first few days or weeks after surgery  It is a good idea to plan for this ahead of time so there will be less hassle getting things set up the night after surgery  What to Expect After Arthroscopic Shoulder Surgery: It is normal to have swelling and discomfort in the shoulder for several days or a week after surgery  It is also normal to have a small amount of drainage from the surgical wounds (especially the first few days after surgery), as we put fluid into the shoulder to visualize the structures during surgery  It is NOT normal to have foul smelling, purulent drainage and if this is noted, please contact the office immediately or proceed to the emergency room for evaluation as this may indicate an infection  Applying ice bags to the shoulder may help with pain that is not controlled by the regional block  Ice should be applied 20-30 minutes at a time, every hour or two  Make sure to put a thin towel or T-shirt next to your skin to avoid direct contact of the ice with the skin  Icing is most helpful in the first 48 hours, although many people find that continuing past this time frame lessens their postoperative pain  Please note that your post-operative dressing is not conductive to ice, so if you need to, it is okay to remove that dressing even the night after surgery and place band-aids over the wounds in order for the ice to take effect  Pain Control    Most patients will receive a nerve block, the local anesthetic may keep your whole arm numb for up to 4 days  You will be given a prescription for narcotic pain medication when you are discharged from the hospital   With the newer nerve block that is being utilized, patients are rarely requiring the use of this narcotic pain medication  If you find you do not tolerate that type of pain medicine well, call our office and we will try another one  In addition to the narcotic pain medication, it is safe to use an anti-inflammatory (unless the patient has a medical condition that would not allow safe use of this mediation)  This includes the Advil, Motrin, Ibuprofen and Alleve category of medications  Simply follow the over the counter dosing on the package and use as indicated as another adjunct  Importantly since these medications are all very similar, use only one of them  Tylenol is a separate medication that can be utilized as well and can be taken at the same time as the other medication or given in a "staggered" manner    Just make sure that you follow the dosing on the over the counter bottle instructions  Also make sure that the pain medication prescribed by Dr Rock Don team does not contain acetaminophen (this is found in Percocet and Vicodin)  Typically we do not prescribe those types of pain medications but if for some reason that has been prescribed DO NOT add more Tylenol (acetaminophen) as you could end up taking too much of that medication  As mentioned above, most patients find that lying down accentuates their discomfort  You might sleep better in a recliner, or propped up in bed  Dr Mee Lobato encourages patients to safely ambulate around the house as much as possible in the first few days after the procedure as this can help with blood circulation in the legs  While the incidence of blood clots is very rare following shoulder surgery, early ambulation is a great way to help decrease the already low rate  24 hours after the surgery you may remove the bandage and cover incisions with Band-Aids if needed  At that time you may shower, the wounds will have a surgical glue that will protect them from shower water but do not submerge your incisions directly (bathing or swimming) until at least 2 weeks post-operatively  It is safe to let the arm hang at the side and take a shower and put on a shirt without the sling on  Just make sure that you do not use the operative are to reach out and grab anything as that may damage the repair  When you are done showering and getting dressed please return the operative arm to the sling  Unless noted otherwise in your discharge paperwork, Dr Mee Lobato uses absorbable sutures so they do not need to be removed  Dr Marciano Gracia physician assistant (PA) will see you in the office a few days after the procedure to review the intra-operative findings and to initiate physical therapy if appropriate    A post-operative appointment should have been scheduled for you already, but if for some reason this did not happen, please call the office to make one  Physical therapy is important after nearly all shoulder surgeries and a detailed rehabilitation plan based on the specific intra-operative findings and procedures will be provided to your therapist at the first post-operative office visit  Most patients have post-operative therapy appointments scheduled pre-operatively, but if you do not, that will be handled at the first post-operative office visit  Unless expressly directed otherwise it is safe to remove the sling even the first day after the surgery and let the arm hang by the side  This allows patients to shower and even put a shirt on (bad arm in the sleeve first)  It is also safe to flex and extend their wrist, hand and fingers as much as possible when the block wears off  These simple motions can serve to pump fluid out of the forearm and decrease swelling in the arm

## 2021-05-24 NOTE — PROGRESS NOTES
Assessment  Diagnoses and all orders for this visit:    Tear of right supraspinatus tendon      Discussion and Plan:    The patient has by report (both the radiologist and Dr Valderrama's review) a smal full thick tear of the supraspinatus with no retraction  This is consistent with his examination  Treatment options were discussed in depth with the patient that includes the PT which has not improved his symptoms   Given this and his young age, operative care is indicated in the form of a right shoulder arthroscopic supraspinatus repair  A thorough discussion was performed with the patient regarding the risks and benefit of operative and nonoperative treatment of their rotator cuff tear  Risks discussed include but were not limited to infection, neurovascular injury, recurrent tear, nonhealing of the repair, need for further surgery, need for biceps tenodesis or tenotomy, stiffness, need for prolonged rehabilitation, as well as the risk of anesthesia  After this discussion all questions were answered and informed consent was obtained in the office for arthroscopic rotator cuff repair of the right shoulder  The patient will be scheduled for this procedure accordingly  Since we have not been able to see the images personally I have requested the patient obtain a copy of the radiologic studies and placed them onto a disc so that we can evaluate them preoperatively and he will do so  Subjective:   Patient ID: Catherine Moscoso is a 39 y o  male      Patient is here for an evaluation for his right shoulder pain  RHD  Patient has previously treated with Dr Johan Nixon who ordered an MRI arthrogram of the right shoulder to further evaluate for rotator cuff and labral pathology despite conservative care treatments with 6 weeks of formal physical therapy, OTC Advil and Tylenol  He notes that most of the deficit is when lifting above the shoulder with weight     Patient wishes to transition care over to Dr Fernando Castillo for surgical intervention  He notes that he had no traumatic injury to the shoulder, but noticed a deficit about 5 months ago  He notes that he likes to lift weights and throw football  His job is mainly a desk job  The following portions of the patient's history were reviewed and updated as appropriate: allergies, current medications, past family history, past medical history, past social history, past surgical history and problem list     Review of Systems   Constitutional: Negative for chills, fever and unexpected weight change  HENT: Negative for hearing loss, nosebleeds and sore throat  Eyes: Negative for pain, redness and visual disturbance  Respiratory: Negative for cough, shortness of breath and wheezing  Cardiovascular: Negative for chest pain, palpitations and leg swelling  Gastrointestinal: Negative for abdominal pain, nausea and vomiting  Endocrine: Negative for polydipsia and polyuria  Genitourinary: Negative for dysuria and hematuria  Skin: Negative for rash and wound  Neurological: Negative for dizziness, light-headedness and headaches  Psychiatric/Behavioral: Negative for decreased concentration, dysphoric mood and suicidal ideas  The patient is not nervous/anxious  Objective:  /74 (BP Location: Left arm, Patient Position: Sitting, Cuff Size: Adult)   Pulse 64   Wt 85 7 kg (189 lb)   BMI 27 12 kg/m²       Right Shoulder Exam     Range of Motion   Active abduction: 160   External rotation: 80   Forward flexion: 170   Internal rotation 0 degrees: Mid thoracic     Muscle Strength   Abduction: 4/5   Internal rotation: 5/5   External rotation: 4/5   Supraspinatus: 3/5     Tests   Jones test: negative    Other   Erythema: absent  Sensation: normal  Pulse: present            Physical Exam  Vitals signs and nursing note reviewed  Constitutional:       Appearance: Normal appearance  He is well-developed  HENT:      Head: Normocephalic and atraumatic     Neck: Musculoskeletal: Normal range of motion and neck supple  Cardiovascular:      Rate and Rhythm: Normal rate and regular rhythm  Heart sounds: Normal heart sounds  Pulmonary:      Effort: Pulmonary effort is normal  No respiratory distress  Breath sounds: Normal breath sounds  Abdominal:      General: There is no distension  Palpations: Abdomen is soft  Skin:     General: Skin is warm and dry  Neurological:      General: No focal deficit present  Mental Status: He is alert and oriented to person, place, and time  Psychiatric:         Mood and Affect: Mood normal          Behavior: Behavior normal            I have personally reviewed the MR Arthrogram radiology reports as being images were not available today and they do document a full-thickness supraspinatus tendon tear and my interpretation is as follows        Scribe Attestation    I,:  Baldev Ramsey am acting as a scribe while in the presence of the attending physician :       I,:  Ag Cardenas MD personally performed the services described in this documentation    as scribed in my presence :

## 2021-05-28 ENCOUNTER — TELEPHONE (OUTPATIENT)
Dept: OBGYN CLINIC | Facility: OTHER | Age: 46
End: 2021-05-28

## 2021-05-28 NOTE — TELEPHONE ENCOUNTER
Left message on machine stating he will need a PCP appt prior to sx on 7/30/2021  I gave him my number to call me back

## 2021-06-08 ENCOUNTER — TELEPHONE (OUTPATIENT)
Dept: OBGYN CLINIC | Facility: OTHER | Age: 46
End: 2021-06-08

## 2021-06-08 NOTE — TELEPHONE ENCOUNTER
Open MRI  Called patient and spoke to him  It is of poor quality but would agree with read - appears to be small full thickness tear of supraspinatus  No muscle atrophy  Treatment plan does not change - still recommend arthroscopy to eval given no improvement with conservative treatment  Patient is scheduled after labor day but we can move up if symptoms worsen

## 2021-06-08 NOTE — TELEPHONE ENCOUNTER
Patient called to advise he dropped off his MRI Disc for Dr Diya Dale to review the results  When you get a chance can you review his images please and give him a call?     His CB # is [phone]

## 2021-12-10 ENCOUNTER — APPOINTMENT (EMERGENCY)
Dept: RADIOLOGY | Facility: HOSPITAL | Age: 46
End: 2021-12-10
Payer: COMMERCIAL

## 2021-12-10 ENCOUNTER — HOSPITAL ENCOUNTER (EMERGENCY)
Facility: HOSPITAL | Age: 46
Discharge: HOME | End: 2021-12-10
Attending: EMERGENCY MEDICINE | Admitting: EMERGENCY MEDICINE
Payer: COMMERCIAL

## 2021-12-10 VITALS
HEIGHT: 71 IN | SYSTOLIC BLOOD PRESSURE: 121 MMHG | DIASTOLIC BLOOD PRESSURE: 77 MMHG | OXYGEN SATURATION: 98 % | TEMPERATURE: 98.6 F | RESPIRATION RATE: 20 BRPM | WEIGHT: 185 LBS | BODY MASS INDEX: 25.9 KG/M2 | HEART RATE: 66 BPM

## 2021-12-10 DIAGNOSIS — S49.91XA INJURY OF RIGHT UPPER ARM, INITIAL ENCOUNTER: Primary | ICD-10-CM

## 2021-12-10 PROCEDURE — 73090 X-RAY EXAM OF FOREARM: CPT

## 2021-12-10 PROCEDURE — 73110 X-RAY EXAM OF WRIST: CPT

## 2021-12-10 PROCEDURE — 99283 EMERGENCY DEPT VISIT LOW MDM: CPT

## 2021-12-10 PROCEDURE — 99284 EMERGENCY DEPT VISIT MOD MDM: CPT | Performed by: PHYSICIAN ASSISTANT

## 2021-12-10 RX ORDER — OXYCODONE HYDROCHLORIDE AND ACETAMINOPHEN 5; 325 MG/1; MG/1
1 TABLET ORAL EVERY 6 HOURS PRN
Qty: 12 TABLET | Refills: 0 | Status: SHIPPED | OUTPATIENT
Start: 2021-12-10 | End: 2021-12-13

## 2021-12-10 RX ORDER — NAPROXEN 500 MG/1
500 TABLET ORAL 2 TIMES DAILY WITH MEALS
Qty: 14 TABLET | Refills: 0 | Status: SHIPPED | OUTPATIENT
Start: 2021-12-10 | End: 2021-12-17

## 2021-12-13 ENCOUNTER — OFFICE VISIT (OUTPATIENT)
Dept: OBGYN CLINIC | Facility: OTHER | Age: 46
End: 2021-12-13
Payer: COMMERCIAL

## 2021-12-13 VITALS
HEART RATE: 51 BPM | DIASTOLIC BLOOD PRESSURE: 73 MMHG | BODY MASS INDEX: 26.6 KG/M2 | SYSTOLIC BLOOD PRESSURE: 110 MMHG | HEIGHT: 71 IN | WEIGHT: 190 LBS

## 2021-12-13 DIAGNOSIS — S49.91XA INJURY OF RIGHT UPPER ARM, INITIAL ENCOUNTER: ICD-10-CM

## 2021-12-13 DIAGNOSIS — S46.211A RUPTURE OF RIGHT DISTAL BICEPS TENDON, INITIAL ENCOUNTER: Primary | ICD-10-CM

## 2021-12-13 PROCEDURE — 99214 OFFICE O/P EST MOD 30 MIN: CPT | Performed by: ORTHOPAEDIC SURGERY

## 2021-12-13 RX ORDER — LORAZEPAM 1 MG/1
1 TABLET ORAL
Qty: 3 TABLET | Refills: 0 | Status: SHIPPED | OUTPATIENT
Start: 2021-12-13 | End: 2021-12-17

## 2021-12-14 ENCOUNTER — HOSPITAL ENCOUNTER (OUTPATIENT)
Dept: MRI IMAGING | Facility: HOSPITAL | Age: 46
Discharge: HOME | End: 2021-12-14
Attending: ORTHOPAEDIC SURGERY
Payer: COMMERCIAL

## 2021-12-14 ENCOUNTER — TELEPHONE (OUTPATIENT)
Dept: OBGYN CLINIC | Facility: HOSPITAL | Age: 46
End: 2021-12-14

## 2021-12-14 DIAGNOSIS — S46.211A RUPTURE OF RIGHT DISTAL BICEPS TENDON, INITIAL ENCOUNTER: ICD-10-CM

## 2021-12-14 PROCEDURE — G1004 CDSM NDSC: HCPCS

## 2021-12-14 PROCEDURE — 73221 MRI JOINT UPR EXTREM W/O DYE: CPT

## 2021-12-16 ENCOUNTER — OFFICE VISIT (OUTPATIENT)
Dept: OBGYN CLINIC | Facility: OTHER | Age: 46
End: 2021-12-16
Payer: COMMERCIAL

## 2021-12-16 VITALS
HEIGHT: 71 IN | SYSTOLIC BLOOD PRESSURE: 126 MMHG | DIASTOLIC BLOOD PRESSURE: 78 MMHG | BODY MASS INDEX: 26.63 KG/M2 | WEIGHT: 190.2 LBS | HEART RATE: 60 BPM

## 2021-12-16 DIAGNOSIS — S46.211A RUPTURE OF RIGHT DISTAL BICEPS TENDON, INITIAL ENCOUNTER: Primary | ICD-10-CM

## 2021-12-16 PROCEDURE — 1036F TOBACCO NON-USER: CPT | Performed by: ORTHOPAEDIC SURGERY

## 2021-12-16 PROCEDURE — 99214 OFFICE O/P EST MOD 30 MIN: CPT | Performed by: ORTHOPAEDIC SURGERY

## 2021-12-16 RX ORDER — CHLORHEXIDINE GLUCONATE 0.12 MG/ML
15 RINSE ORAL ONCE
Status: CANCELLED | OUTPATIENT
Start: 2021-12-16 | End: 2021-12-16

## 2021-12-20 ENCOUNTER — ANESTHESIA EVENT (OUTPATIENT)
Dept: PERIOP | Facility: HOSPITAL | Age: 46
End: 2021-12-20
Payer: COMMERCIAL

## 2021-12-21 ENCOUNTER — HOSPITAL ENCOUNTER (OUTPATIENT)
Facility: HOSPITAL | Age: 46
Setting detail: OUTPATIENT SURGERY
Discharge: HOME | End: 2021-12-21
Attending: ORTHOPAEDIC SURGERY | Admitting: ORTHOPAEDIC SURGERY
Payer: COMMERCIAL

## 2021-12-21 ENCOUNTER — HOSPITAL ENCOUNTER (OUTPATIENT)
Dept: RADIOLOGY | Facility: HOSPITAL | Age: 46
Setting detail: OUTPATIENT SURGERY
Discharge: HOME | End: 2021-12-21
Payer: COMMERCIAL

## 2021-12-21 ENCOUNTER — ANESTHESIA (OUTPATIENT)
Dept: PERIOP | Facility: HOSPITAL | Age: 46
End: 2021-12-21
Payer: COMMERCIAL

## 2021-12-21 VITALS
DIASTOLIC BLOOD PRESSURE: 70 MMHG | SYSTOLIC BLOOD PRESSURE: 130 MMHG | HEART RATE: 60 BPM | BODY MASS INDEX: 25.9 KG/M2 | WEIGHT: 185 LBS | TEMPERATURE: 98.3 F | RESPIRATION RATE: 16 BRPM | OXYGEN SATURATION: 100 % | HEIGHT: 71 IN

## 2021-12-21 DIAGNOSIS — S46.211A RUPTURE OF RIGHT DISTAL BICEPS TENDON, INITIAL ENCOUNTER: ICD-10-CM

## 2021-12-21 DIAGNOSIS — S46.211D RUPTURE OF RIGHT DISTAL BICEPS TENDON, SUBSEQUENT ENCOUNTER: Primary | ICD-10-CM

## 2021-12-21 PROCEDURE — 24342 REPAIR OF RUPTURED TENDON: CPT | Performed by: PHYSICIAN ASSISTANT

## 2021-12-21 PROCEDURE — 73070 X-RAY EXAM OF ELBOW: CPT

## 2021-12-21 PROCEDURE — C1713 ANCHOR/SCREW BN/BN,TIS/BN: HCPCS | Performed by: ORTHOPAEDIC SURGERY

## 2021-12-21 PROCEDURE — C9290 INJ, BUPIVACAINE LIPOSOME: HCPCS | Performed by: ANESTHESIOLOGY

## 2021-12-21 PROCEDURE — 24342 REPAIR OF RUPTURED TENDON: CPT | Performed by: ORTHOPAEDIC SURGERY

## 2021-12-21 DEVICE — SYSTEM IMPLANT DSTL BICEP REPAIR: Type: IMPLANTABLE DEVICE | Site: ELBOW | Status: FUNCTIONAL

## 2021-12-21 RX ORDER — BUPIVACAINE HYDROCHLORIDE 5 MG/ML
INJECTION, SOLUTION PERINEURAL
Status: COMPLETED | OUTPATIENT
Start: 2021-12-21 | End: 2021-12-21

## 2021-12-21 RX ORDER — LIDOCAINE HYDROCHLORIDE 10 MG/ML
INJECTION, SOLUTION EPIDURAL; INFILTRATION; INTRACAUDAL; PERINEURAL AS NEEDED
Status: DISCONTINUED | OUTPATIENT
Start: 2021-12-21 | End: 2021-12-21

## 2021-12-21 RX ORDER — SODIUM CHLORIDE, SODIUM LACTATE, POTASSIUM CHLORIDE, CALCIUM CHLORIDE 600; 310; 30; 20 MG/100ML; MG/100ML; MG/100ML; MG/100ML
125 INJECTION, SOLUTION INTRAVENOUS CONTINUOUS
Status: DISCONTINUED | OUTPATIENT
Start: 2021-12-21 | End: 2021-12-21 | Stop reason: HOSPADM

## 2021-12-21 RX ORDER — MAGNESIUM HYDROXIDE 1200 MG/15ML
LIQUID ORAL AS NEEDED
Status: DISCONTINUED | OUTPATIENT
Start: 2021-12-21 | End: 2021-12-21 | Stop reason: HOSPADM

## 2021-12-21 RX ORDER — LIDOCAINE HYDROCHLORIDE 10 MG/ML
0.5 INJECTION, SOLUTION EPIDURAL; INFILTRATION; INTRACAUDAL; PERINEURAL ONCE AS NEEDED
Status: COMPLETED | OUTPATIENT
Start: 2021-12-21 | End: 2021-12-21

## 2021-12-21 RX ORDER — FENTANYL CITRATE/PF 50 MCG/ML
25 SYRINGE (ML) INJECTION
Status: DISCONTINUED | OUTPATIENT
Start: 2021-12-21 | End: 2021-12-21 | Stop reason: HOSPADM

## 2021-12-21 RX ORDER — OXYCODONE HYDROCHLORIDE 5 MG/1
5 TABLET ORAL EVERY 4 HOURS PRN
Status: DISCONTINUED | OUTPATIENT
Start: 2021-12-21 | End: 2021-12-21 | Stop reason: HOSPADM

## 2021-12-21 RX ORDER — DEXAMETHASONE SODIUM PHOSPHATE 4 MG/ML
INJECTION, SOLUTION INTRA-ARTICULAR; INTRALESIONAL; INTRAMUSCULAR; INTRAVENOUS; SOFT TISSUE AS NEEDED
Status: DISCONTINUED | OUTPATIENT
Start: 2021-12-21 | End: 2021-12-21

## 2021-12-21 RX ORDER — HYDROMORPHONE HCL/PF 1 MG/ML
0.5 SYRINGE (ML) INJECTION
Status: DISCONTINUED | OUTPATIENT
Start: 2021-12-21 | End: 2021-12-21 | Stop reason: HOSPADM

## 2021-12-21 RX ORDER — MEPERIDINE HYDROCHLORIDE 25 MG/ML
12.5 INJECTION INTRAMUSCULAR; INTRAVENOUS; SUBCUTANEOUS ONCE
Status: DISCONTINUED | OUTPATIENT
Start: 2021-12-21 | End: 2021-12-21 | Stop reason: HOSPADM

## 2021-12-21 RX ORDER — FENTANYL CITRATE 50 UG/ML
INJECTION, SOLUTION INTRAMUSCULAR; INTRAVENOUS AS NEEDED
Status: DISCONTINUED | OUTPATIENT
Start: 2021-12-21 | End: 2021-12-21

## 2021-12-21 RX ORDER — CHLORHEXIDINE GLUCONATE 0.12 MG/ML
15 RINSE ORAL ONCE
Status: DISCONTINUED | OUTPATIENT
Start: 2021-12-21 | End: 2021-12-21

## 2021-12-21 RX ORDER — OXYCODONE HYDROCHLORIDE 5 MG/1
10 TABLET ORAL EVERY 4 HOURS PRN
Status: DISCONTINUED | OUTPATIENT
Start: 2021-12-21 | End: 2021-12-21 | Stop reason: HOSPADM

## 2021-12-21 RX ORDER — PROPOFOL 10 MG/ML
INJECTION, EMULSION INTRAVENOUS AS NEEDED
Status: DISCONTINUED | OUTPATIENT
Start: 2021-12-21 | End: 2021-12-21

## 2021-12-21 RX ORDER — ONDANSETRON 2 MG/ML
4 INJECTION INTRAMUSCULAR; INTRAVENOUS EVERY 6 HOURS PRN
Status: DISCONTINUED | OUTPATIENT
Start: 2021-12-21 | End: 2021-12-21 | Stop reason: HOSPADM

## 2021-12-21 RX ORDER — OXYCODONE HYDROCHLORIDE 5 MG/1
TABLET ORAL
Qty: 13 TABLET | Refills: 0 | Status: SHIPPED | OUTPATIENT
Start: 2021-12-21 | End: 2022-06-10

## 2021-12-21 RX ORDER — LABETALOL 20 MG/4 ML (5 MG/ML) INTRAVENOUS SYRINGE
5
Status: DISCONTINUED | OUTPATIENT
Start: 2021-12-21 | End: 2021-12-21 | Stop reason: HOSPADM

## 2021-12-21 RX ORDER — ACETAMINOPHEN 325 MG/1
650 TABLET ORAL EVERY 6 HOURS PRN
Status: DISCONTINUED | OUTPATIENT
Start: 2021-12-21 | End: 2021-12-21 | Stop reason: HOSPADM

## 2021-12-21 RX ORDER — ONDANSETRON 2 MG/ML
4 INJECTION INTRAMUSCULAR; INTRAVENOUS ONCE AS NEEDED
Status: DISCONTINUED | OUTPATIENT
Start: 2021-12-21 | End: 2021-12-21 | Stop reason: HOSPADM

## 2021-12-21 RX ORDER — PROMETHAZINE HYDROCHLORIDE 25 MG/ML
12.5 INJECTION, SOLUTION INTRAMUSCULAR; INTRAVENOUS ONCE AS NEEDED
Status: DISCONTINUED | OUTPATIENT
Start: 2021-12-21 | End: 2021-12-21 | Stop reason: HOSPADM

## 2021-12-21 RX ORDER — SODIUM CHLORIDE, SODIUM LACTATE, POTASSIUM CHLORIDE, CALCIUM CHLORIDE 600; 310; 30; 20 MG/100ML; MG/100ML; MG/100ML; MG/100ML
INJECTION, SOLUTION INTRAVENOUS CONTINUOUS PRN
Status: DISCONTINUED | OUTPATIENT
Start: 2021-12-21 | End: 2021-12-21

## 2021-12-21 RX ORDER — MIDAZOLAM HYDROCHLORIDE 2 MG/2ML
INJECTION, SOLUTION INTRAMUSCULAR; INTRAVENOUS AS NEEDED
Status: DISCONTINUED | OUTPATIENT
Start: 2021-12-21 | End: 2021-12-21

## 2021-12-21 RX ORDER — ONDANSETRON 2 MG/ML
INJECTION INTRAMUSCULAR; INTRAVENOUS AS NEEDED
Status: DISCONTINUED | OUTPATIENT
Start: 2021-12-21 | End: 2021-12-21

## 2021-12-21 RX ORDER — CEFAZOLIN SODIUM 2 G/50ML
2000 SOLUTION INTRAVENOUS ONCE
Status: COMPLETED | OUTPATIENT
Start: 2021-12-21 | End: 2021-12-21

## 2021-12-21 RX ORDER — ALBUTEROL SULFATE 2.5 MG/3ML
2.5 SOLUTION RESPIRATORY (INHALATION) ONCE AS NEEDED
Status: DISCONTINUED | OUTPATIENT
Start: 2021-12-21 | End: 2021-12-21 | Stop reason: HOSPADM

## 2021-12-21 RX ADMIN — SODIUM CHLORIDE, SODIUM LACTATE, POTASSIUM CHLORIDE, AND CALCIUM CHLORIDE 125 ML/HR: .6; .31; .03; .02 INJECTION, SOLUTION INTRAVENOUS at 10:47

## 2021-12-21 RX ADMIN — DEXAMETHASONE SODIUM PHOSPHATE 8 MG: 4 INJECTION, SOLUTION INTRAMUSCULAR; INTRAVENOUS at 11:47

## 2021-12-21 RX ADMIN — CEFAZOLIN SODIUM 2000 MG: 2 SOLUTION INTRAVENOUS at 11:43

## 2021-12-21 RX ADMIN — LIDOCAINE HYDROCHLORIDE 50 MG: 10 INJECTION, SOLUTION EPIDURAL; INFILTRATION; INTRACAUDAL; PERINEURAL at 11:46

## 2021-12-21 RX ADMIN — MIDAZOLAM 2 MG: 1 INJECTION INTRAMUSCULAR; INTRAVENOUS at 11:08

## 2021-12-21 RX ADMIN — PROPOFOL 200 MG: 10 INJECTION, EMULSION INTRAVENOUS at 11:46

## 2021-12-21 RX ADMIN — BUPIVACAINE HYDROCHLORIDE 5 ML: 5 INJECTION, SOLUTION PERINEURAL at 11:11

## 2021-12-21 RX ADMIN — OXYCODONE HYDROCHLORIDE 10 MG: 5 TABLET ORAL at 14:20

## 2021-12-21 RX ADMIN — SODIUM CHLORIDE, SODIUM LACTATE, POTASSIUM CHLORIDE, AND CALCIUM CHLORIDE: .6; .31; .03; .02 INJECTION, SOLUTION INTRAVENOUS at 11:43

## 2021-12-21 RX ADMIN — BUPIVACAINE 20 ML: 13.3 INJECTION, SUSPENSION, LIPOSOMAL INFILTRATION at 11:10

## 2021-12-21 RX ADMIN — FENTANYL CITRATE 50 MCG: 50 INJECTION INTRAMUSCULAR; INTRAVENOUS at 11:08

## 2021-12-21 RX ADMIN — ONDANSETRON 4 MG: 2 INJECTION INTRAMUSCULAR; INTRAVENOUS at 12:37

## 2021-12-21 RX ADMIN — LIDOCAINE HYDROCHLORIDE 0.5 ML: 10 INJECTION, SOLUTION EPIDURAL; INFILTRATION; INTRACAUDAL; PERINEURAL at 10:48

## 2021-12-27 ENCOUNTER — OFFICE VISIT (OUTPATIENT)
Dept: OBGYN CLINIC | Facility: OTHER | Age: 46
End: 2021-12-27

## 2021-12-27 ENCOUNTER — EVALUATION (OUTPATIENT)
Dept: PHYSICAL THERAPY | Facility: CLINIC | Age: 46
End: 2021-12-27
Payer: COMMERCIAL

## 2021-12-27 VITALS
HEART RATE: 66 BPM | WEIGHT: 189 LBS | BODY MASS INDEX: 26.46 KG/M2 | DIASTOLIC BLOOD PRESSURE: 76 MMHG | SYSTOLIC BLOOD PRESSURE: 116 MMHG | HEIGHT: 71 IN

## 2021-12-27 DIAGNOSIS — M25.521 RIGHT ELBOW PAIN: ICD-10-CM

## 2021-12-27 DIAGNOSIS — Z09 S/P ORTHOPEDIC SURGERY, FOLLOW-UP EXAM: ICD-10-CM

## 2021-12-27 DIAGNOSIS — S46.211D RUPTURE OF RIGHT DISTAL BICEPS TENDON, SUBSEQUENT ENCOUNTER: Primary | ICD-10-CM

## 2021-12-27 DIAGNOSIS — S46.211A RUPTURE OF RIGHT DISTAL BICEPS TENDON, INITIAL ENCOUNTER: Primary | ICD-10-CM

## 2021-12-27 PROCEDURE — 97535 SELF CARE MNGMENT TRAINING: CPT | Performed by: PHYSICAL THERAPIST

## 2021-12-27 PROCEDURE — 97161 PT EVAL LOW COMPLEX 20 MIN: CPT | Performed by: PHYSICAL THERAPIST

## 2021-12-27 PROCEDURE — 97140 MANUAL THERAPY 1/> REGIONS: CPT | Performed by: PHYSICAL THERAPIST

## 2021-12-27 PROCEDURE — 99024 POSTOP FOLLOW-UP VISIT: CPT | Performed by: ORTHOPAEDIC SURGERY

## 2021-12-27 PROCEDURE — 3008F BODY MASS INDEX DOCD: CPT | Performed by: ORTHOPAEDIC SURGERY

## 2021-12-30 ENCOUNTER — OFFICE VISIT (OUTPATIENT)
Dept: PHYSICAL THERAPY | Facility: CLINIC | Age: 46
End: 2021-12-30
Payer: COMMERCIAL

## 2021-12-30 DIAGNOSIS — S46.211A RUPTURE OF RIGHT DISTAL BICEPS TENDON, INITIAL ENCOUNTER: Primary | ICD-10-CM

## 2021-12-30 DIAGNOSIS — Z09 S/P ORTHOPEDIC SURGERY, FOLLOW-UP EXAM: ICD-10-CM

## 2021-12-30 DIAGNOSIS — M25.521 RIGHT ELBOW PAIN: ICD-10-CM

## 2021-12-30 PROCEDURE — 97110 THERAPEUTIC EXERCISES: CPT

## 2021-12-30 PROCEDURE — 97140 MANUAL THERAPY 1/> REGIONS: CPT

## 2021-12-30 PROCEDURE — 97112 NEUROMUSCULAR REEDUCATION: CPT

## 2022-01-03 ENCOUNTER — OFFICE VISIT (OUTPATIENT)
Dept: PHYSICAL THERAPY | Facility: CLINIC | Age: 47
End: 2022-01-03
Payer: COMMERCIAL

## 2022-01-03 DIAGNOSIS — M25.521 RIGHT ELBOW PAIN: ICD-10-CM

## 2022-01-03 DIAGNOSIS — S46.211A RUPTURE OF RIGHT DISTAL BICEPS TENDON, INITIAL ENCOUNTER: Primary | ICD-10-CM

## 2022-01-03 DIAGNOSIS — Z09 S/P ORTHOPEDIC SURGERY, FOLLOW-UP EXAM: ICD-10-CM

## 2022-01-03 PROCEDURE — 97112 NEUROMUSCULAR REEDUCATION: CPT | Performed by: PHYSICAL THERAPIST

## 2022-01-03 PROCEDURE — 97110 THERAPEUTIC EXERCISES: CPT | Performed by: PHYSICAL THERAPIST

## 2022-01-03 PROCEDURE — 97140 MANUAL THERAPY 1/> REGIONS: CPT | Performed by: PHYSICAL THERAPIST

## 2022-01-03 PROCEDURE — 97535 SELF CARE MNGMENT TRAINING: CPT | Performed by: PHYSICAL THERAPIST

## 2022-01-03 NOTE — PROGRESS NOTES
Daily Note     Today's date: 1/3/2022  Patient name: Ewa Buchanan  : 1975  MRN: [id number]  Referring provider: Wayne Pulido  Dx:   Encounter Diagnosis     ICD-10-CM    1  Rupture of right distal biceps tendon, initial encounter  S46 211A    2  Right elbow pain  M25 521    3  S/P orthopedic surgery, follow-up exam  Z09                   Subjective:  Patient reports the forearm feels tight today with general soreness in the right elbow to 4/10 level  Post session, the patient reports verbal understanding of HEP with overall minimal soreness to 1/10 level  Objective: See treatment diary below      Assessment: Tolerated treatment well  PT notes continuation of progression of POC with focus right UE stabilization and manual therapy to meet therapy goals  PT notes progression as per MD protocol  Plan: Continue per plan of care        Precautions:  MD protocol       Manuals  1/3     Right elbow, wrist, and forearm  15 min  15' 15 min                              Neuro Re-Ed        S/L ER and ABD    2x10 Each      Scapular pinch into wall   10x5" 15x5" Hold      Ball  whole hand and fingers only   10x ea 20x Each   Green Ball      AAROM Elbow Flex and ext, Pro/sup   10x Each      Genie Ball    NV                     Ther Ex        Wrist flex and ext   2x10 10x Each   1#      Tputty    2' 3 min   Green      Tputty pinch  2' 3 min   Green      Tputty pinch and pull   2' 3 min   Green                              HEP update/review  15 min   10 min      Ther Activity                        Gait Training                        Modalities        CP to the right elbow 15 min  15' 15 min

## 2022-01-06 ENCOUNTER — OFFICE VISIT (OUTPATIENT)
Dept: PHYSICAL THERAPY | Facility: CLINIC | Age: 47
End: 2022-01-06
Payer: COMMERCIAL

## 2022-01-06 DIAGNOSIS — M25.521 RIGHT ELBOW PAIN: ICD-10-CM

## 2022-01-06 DIAGNOSIS — Z09 S/P ORTHOPEDIC SURGERY, FOLLOW-UP EXAM: ICD-10-CM

## 2022-01-06 DIAGNOSIS — S46.211A RUPTURE OF RIGHT DISTAL BICEPS TENDON, INITIAL ENCOUNTER: Primary | ICD-10-CM

## 2022-01-06 PROCEDURE — 97112 NEUROMUSCULAR REEDUCATION: CPT

## 2022-01-06 PROCEDURE — 97140 MANUAL THERAPY 1/> REGIONS: CPT

## 2022-01-06 PROCEDURE — 97110 THERAPEUTIC EXERCISES: CPT

## 2022-01-06 NOTE — PROGRESS NOTES
Daily Note     Today's date: 2022  Patient name: Daniela Dash  : 1975  MRN: [id number]  Referring provider: Raisa Toth  Dx:   Encounter Diagnosis     ICD-10-CM    1  Rupture of right distal biceps tendon, initial encounter  S46 211A    2  Right elbow pain  M25 521    3  S/P orthopedic surgery, follow-up exam  Z09                   Subjective: No new c/o pain today  Objective: See treatment diary below      Assessment: Tolerated treatment well  Patient exhibited good technique with therapeutic exercises and would benefit from continued PT to increase R UE ROM/strength and endurance to improve mobility  Plan: Continue per plan of care        Precautions:  MD protocol       Manuals 12/27 12/30 1/3 1/6    Right elbow, wrist, and forearm  15 min  15' 15 min  15'                            Neuro Re-Ed      S/L ER and ABD    2x10 Each  2x10 ea    Scapular pinch into wall   10x5" 15x5" Hold  15x5" w/ arm in sling    Ball  whole hand and fingers only   10x ea 20x Each   Green Ball  20x ea Green ball    AAROM Elbow Flex and ext, Pro/sup   10x Each  10x ea    Genie Ball    NV 10x bilat                    Ther Ex      Wrist flex and ext   2x10 10x Each   1#  10x ea 1#    Tputty    2' 3 min   Green  3' GREEN    Tputty pinch  2' 3 min   Green  3' GREEN    Tputty pinch and pull   2' 3 min   Green  3' GREEN                            HEP update/review  15 min   10 min      Ther Activity                      Gait Training                      Modalities      CP to the right elbow 15 min  15' 15 min  15'

## 2022-01-10 ENCOUNTER — APPOINTMENT (OUTPATIENT)
Dept: PHYSICAL THERAPY | Facility: CLINIC | Age: 47
End: 2022-01-10
Payer: COMMERCIAL

## 2022-01-13 ENCOUNTER — OFFICE VISIT (OUTPATIENT)
Dept: PHYSICAL THERAPY | Facility: CLINIC | Age: 47
End: 2022-01-13
Payer: COMMERCIAL

## 2022-01-13 DIAGNOSIS — S46.211A RUPTURE OF RIGHT DISTAL BICEPS TENDON, INITIAL ENCOUNTER: Primary | ICD-10-CM

## 2022-01-13 DIAGNOSIS — M25.521 RIGHT ELBOW PAIN: ICD-10-CM

## 2022-01-13 DIAGNOSIS — Z09 S/P ORTHOPEDIC SURGERY, FOLLOW-UP EXAM: ICD-10-CM

## 2022-01-13 PROCEDURE — 97112 NEUROMUSCULAR REEDUCATION: CPT

## 2022-01-13 PROCEDURE — 97140 MANUAL THERAPY 1/> REGIONS: CPT

## 2022-01-13 PROCEDURE — 97110 THERAPEUTIC EXERCISES: CPT

## 2022-01-13 NOTE — PROGRESS NOTES
Daily Note     Today's date: 2022  Patient name: Ml Canela  : 1975  MRN: [id number]  Referring provider: Pete Pena  Dx:   Encounter Diagnosis     ICD-10-CM    1  Rupture of right distal biceps tendon, initial encounter  S46 211A    2  Right elbow pain  M25 521    3  S/P orthopedic surgery, follow-up exam  Z09                   Subjective: "I've been sporadic with the brace, but my arm has been feeling good "      Objective: See treatment diary below      Assessment: Tolerated treatment well  Patient exhibited good technique with therapeutic exercises and would benefit from continued PT to increase R elbow ROM/strength and endurance to improve mobility  Plan: Continue per plan of care        Precautions:  MD protocol       Manuals 12/27 12/30 1/3 1/6 1/13   Right elbow, wrist, and forearm  15 min  15' 15 min  15' 15'                           Neuro Re-Ed     S/L ER and ABD    2x10 Each  2x10 ea Scap 4 Way 2x10 ea   Scapular pinch into wall   10x5" 15x5" Hold  15x5" w/ arm in sling 15x5" no sling   Ball  whole hand and fingers only   10x ea 20x Each   Green Ball  20x ea Green ball 20x ea Green ball   AAROM Elbow Flex and ext, Pro/sup   10x Each  10x ea 2x10  3"hold ea   Genie Ball    NV 10x bilat 2x10 bilat                   Ther Ex     Wrist flex and ext   2x10 10x Each   1#  10x ea 1# 2x10 ea 1#   Tputty    2' 3 min   Green  3' GREEN 3' GREEN   Tputty pinch  2' 3 min   Green  3' GREEN 3' GREEN   Tputty pinch and pull   2' 3 min   Green  3' GREEN 3' GREEN                           HEP update/review  15 min   10 min      Ther Activity                     Gait Training                     Modalities     CP to the right elbow 15 min  15' 15 min  15' 15'

## 2022-01-17 ENCOUNTER — APPOINTMENT (OUTPATIENT)
Dept: PHYSICAL THERAPY | Facility: CLINIC | Age: 47
End: 2022-01-17
Payer: COMMERCIAL

## 2022-01-18 ENCOUNTER — OFFICE VISIT (OUTPATIENT)
Dept: PHYSICAL THERAPY | Facility: CLINIC | Age: 47
End: 2022-01-18
Payer: COMMERCIAL

## 2022-01-18 DIAGNOSIS — S46.211A RUPTURE OF RIGHT DISTAL BICEPS TENDON, INITIAL ENCOUNTER: Primary | ICD-10-CM

## 2022-01-18 DIAGNOSIS — Z09 S/P ORTHOPEDIC SURGERY, FOLLOW-UP EXAM: ICD-10-CM

## 2022-01-18 DIAGNOSIS — M25.521 RIGHT ELBOW PAIN: ICD-10-CM

## 2022-01-18 PROCEDURE — 97112 NEUROMUSCULAR REEDUCATION: CPT

## 2022-01-18 PROCEDURE — 97140 MANUAL THERAPY 1/> REGIONS: CPT

## 2022-01-18 PROCEDURE — 97110 THERAPEUTIC EXERCISES: CPT

## 2022-01-18 NOTE — PROGRESS NOTES
Daily Note     Today's date: 2022  Patient name: Isadora Gaucher  : 1975  MRN: [id number]  Referring provider: Osmani Schulz  Dx:   Encounter Diagnosis     ICD-10-CM    1  Rupture of right distal biceps tendon, initial encounter  S46 211A    2  Right elbow pain  M25 521    3  S/P orthopedic surgery, follow-up exam  Z09                   Subjective: No new c/o pain today  Objective: See treatment diary below      Assessment: Tolerated treatment well  Patient exhibited good technique with therapeutic exercises and would benefit from continued PT to increase R elbow ROM/strength and endurance to improve mobility  Plan: Continue per plan of care        Precautions:  MD protocol       Manuals 12/30 1/3 1/6 1/13 1/18   Right elbow, wrist, and forearm  15' 15 min  15' 15' 15'                           Neuro Re-Ed    S/L ER and ABD   2x10 Each  2x10 ea Scap 4 Way 2x10 ea 4 Way   2x10 ea   Scapular pinch into wall  10x5" 15x5" Hold  15x5" w/ arm in sling 15x5" no sling 15x5"   Ball  whole hand and fingers only  10x ea 20x Each   Green Ball  20x ea Green ball 20x ea Green ball 20x ea Green ball   AAROM Elbow Flex and ext, Pro/sup  10x Each  10x ea 2x10  3"hold ea 2x10ea  3"hold   Genie Ball   NV 10x bilat 2x10 bilat 2x10 bilat                   Ther Ex    UBE     3'/3'   Wrist flex and ext  2x10 10x Each   1#  10x ea 1# 2x10 ea 1# 2x10 ea 1#   Tputty   2' 3 min   Green  3' GREEN 3' GREEN 3' GREEN   Tputty pinch 2' 3 min   Green  3' GREEN 3' GREEN 3' GREEN   Tputty pinch and pull  2' 3 min   Green  3' GREEN 3' GREEN 3' GREEN                           HEP update/review   10 min       Ther Activity                    Gait Training                    Modalities    CP to the right elbow 15' 15 min  15' 15' 15'

## 2022-01-20 ENCOUNTER — OFFICE VISIT (OUTPATIENT)
Dept: PHYSICAL THERAPY | Facility: CLINIC | Age: 47
End: 2022-01-20
Payer: COMMERCIAL

## 2022-01-20 DIAGNOSIS — M25.521 RIGHT ELBOW PAIN: ICD-10-CM

## 2022-01-20 DIAGNOSIS — Z09 S/P ORTHOPEDIC SURGERY, FOLLOW-UP EXAM: ICD-10-CM

## 2022-01-20 DIAGNOSIS — S46.211A RUPTURE OF RIGHT DISTAL BICEPS TENDON, INITIAL ENCOUNTER: Primary | ICD-10-CM

## 2022-01-20 PROCEDURE — 97140 MANUAL THERAPY 1/> REGIONS: CPT

## 2022-01-20 PROCEDURE — 97110 THERAPEUTIC EXERCISES: CPT

## 2022-01-20 PROCEDURE — 97112 NEUROMUSCULAR REEDUCATION: CPT

## 2022-01-20 NOTE — PROGRESS NOTES
Daily Note     Today's date: 2022  Patient name: Nevaeh Boyd  : 1975  MRN: [id number]  Referring provider: Tammy Winters  Dx:   Encounter Diagnosis     ICD-10-CM    1  Rupture of right distal biceps tendon, initial encounter  S46 211A    2  Right elbow pain  M25 521    3  S/P orthopedic surgery, follow-up exam  Z09                   Subjective: "I feel really good since I can move this arm a little better "      Objective: See treatment diary below      Assessment: Tolerated treatment well  Patient exhibited good technique with therapeutic exercises and would benefit from continued PT to increase R elbow ROM/strength and endurance to improve mobility  Plan: Continue per plan of care        Precautions:  MD protocol       Manuals 1/3 1/6 1/13 1/18 1/20   Right elbow, wrist, and forearm  15 min  15' 15' 15' 15'                           Neuro Re-Ed     S/L ER and ABD  2x10 Each  2x10 ea Scap 4 Way 2x10 ea 4 Way   2x10 ea 4 way 1# 20x   Scapular pinch into wall  15x5" Hold  15x5" w/ arm in sling 15x5" no sling 15x5" 15x5"   Ball  whole hand and fingers only  20x Each   Green Ball  20x ea Green ball 20x ea Green ball 20x ea Green ball 20x ea Green ball   AAROM Elbow Flex and ext, Pro/sup 10x Each  10x ea 2x10  3"hold ea 2x10ea  3"hold 2x10 ea 3"hold   Genie Ball  NV 10x bilat 2x10 bilat 2x10 bilat [id number]                   Ther Ex     UBE    3'/3' 10' 120resist alt   Wrist flex and ext  10x Each   1#  10x ea 1# 2x10 ea 1# 2x10 ea 1# 2x10 ea 1#   Tputty   3 min   Green  3' GREEN 3' GREEN 3' GREEN 3' GREEN   Tputty pinch 3 min   Green  3' GREEN 3' GREEN 3' GREEN 3' GREEN   Tputty pinch and pull  3 min   Green  3' GREEN 3' GREEN 3' GREEN 3' GREEN   Digiflex     20x   Power Web     20x ea   Resisted Elbow Ext     2x10 RED   HEP update/review  10 min        Ther Activity                     Gait Training  1 Modalities  1/6 1/13 1/18 1/20   CP to the right elbow 15 min  15' 15' 15' 15'

## 2022-01-24 ENCOUNTER — EVALUATION (OUTPATIENT)
Dept: PHYSICAL THERAPY | Facility: CLINIC | Age: 47
End: 2022-01-24
Payer: COMMERCIAL

## 2022-01-24 DIAGNOSIS — S46.211A RUPTURE OF RIGHT DISTAL BICEPS TENDON, INITIAL ENCOUNTER: Primary | ICD-10-CM

## 2022-01-24 DIAGNOSIS — M25.521 RIGHT ELBOW PAIN: ICD-10-CM

## 2022-01-24 DIAGNOSIS — Z09 S/P ORTHOPEDIC SURGERY, FOLLOW-UP EXAM: ICD-10-CM

## 2022-01-24 PROCEDURE — 97110 THERAPEUTIC EXERCISES: CPT | Performed by: PHYSICAL THERAPIST

## 2022-01-24 PROCEDURE — 97112 NEUROMUSCULAR REEDUCATION: CPT | Performed by: PHYSICAL THERAPIST

## 2022-01-24 PROCEDURE — 97140 MANUAL THERAPY 1/> REGIONS: CPT | Performed by: PHYSICAL THERAPIST

## 2022-01-24 NOTE — PROGRESS NOTES
PT Re-Evaluation     Today's date: 2022  Patient name: Nevaeh Boyd  : 1975  MRN: [id number]  Referring provider: Tammy Winters  Dx:   Encounter Diagnosis     ICD-10-CM    1  Rupture of right distal biceps tendon, initial encounter  S46 211A    2  Right elbow pain  M25 521    3  S/P orthopedic surgery, follow-up exam  Z09                   Assessment  Assessment details: PT notes the patient with continuation of decrease ROM and strength t/o the right bicep and UE s/p right distal rupture repair leading to increase pain levels and functional limitations with need for continuation of skilled therapy for 6-8 weeks with focus on strengthening, posture, manual therapy, analgesic modalities, and update/review of HEP  PT will comply with MD protocol and progress as tolerated by patient  Impairments: abnormal or restricted ROM, activity intolerance, impaired physical strength, lacks appropriate home exercise program, pain with function, weight-bearing intolerance and poor posture   Understanding of Dx/Px/POC: good   Prognosis: good    Goals  ST  Initiate HEP-MET  2  Decrease pain levels by 25-50%-MET   3  Increase strength by 1-2 mm grades-MET  4  Increase ROM by 25-50%-MET   LT  Improve postural awareness-Partial MET   2  Improve scapular stability-Partial MET   3  Decrease limitations with reaching, lifting and carrying-Partial MET  4  Decrease limitations with gripping and push/pull-Partial MET  5  Decrease limitations with ADL-Partial MET  6  RTW full duty-NOT MET  7   DC with HEP-Progressing       Plan  Plan details: PT notes review of POC and findings with patient who is in agreement with PT recommendations of continuation of skilled therapy      Patient would benefit from: skilled physical therapy  Planned modality interventions: cryotherapy  Planned therapy interventions: manual therapy, neuromuscular re-education, patient education, postural training, self care, strengthening, stretching, therapeutic exercise, home exercise program, graded exercise and flexibility  Frequency: 2x week  Duration in weeks: 8  Treatment plan discussed with: patient        Subjective Evaluation    History of Present Illness  Date of surgery: 2021  Mechanism of injury: surgery  Mechanism of injury: Patient reports catching a cat falling out of a tree  When he felt immediate pull and pain in the right elbow  Patient went to ortho MD for evaluation and found to have + distal bicep tear with need for surgical repair  Patient underwent the corrective procedure on 21 and is now p/o with orders for OPPT  Patient reports difficulty with gripping, lifting, carrying, ADL, work duties, recreation, reaching, and sleep  Patient reports he is presently OOW as  with significant PMH of right RC injury  Presently the patient has attended 8 sessions of skilled therapy and feels 50-60% improvement since the start of therapy  Patient reports the elbow and wrist are moving better with overall decrease pain levels  Patient reports the arm is still weak with limitations with lifting, carrying, OH, and push/pull activities with need for continuation of skilled therapy      Pain  Current pain ratin  At best pain ratin  At worst pain rating: 3  Location: Right elbow  Quality: dull ache, discomfort, needle-like and pulling  Relieving factors: rest  Aggravating factors: overhead activity and lifting  Progression: improved      Diagnostic Tests  MRI studies: abnormal  Treatments  Current treatment: immobilization and physical therapy  Patient Goals  Patient goals for therapy: decreased pain, increased motion, increased strength, independence with ADLs/IADLs, return to sport/leisure activities and return to work          Objective     Postural Observations  Seated posture: fair  Standing posture: fair        Active Range of Motion     Right Shoulder   Flexion: 110 degrees with pain  Extension: WFL  Abduction: 105 degrees with pain  External rotation 90°: 54 degreeswith pain  Internal rotation 90°: 50 degrees with pain    Right Elbow   Flexion: WFL  Extension: -7 degrees   Forearm supination: 39 degrees   Forearm pronation: 34 degrees     Right Wrist   Wrist flexion: 54 degrees   Wrist extension: 52 degrees   Radial deviation: WFL  Ulnar deviation: WFL    Passive Range of Motion     Right Shoulder   Flexion: 165 degrees with pain  Abduction: 160 degrees with pain  External rotation 90°: 64 degrees with pain  Internal rotation 90°: 57 degrees with pain    Right Elbow   Flexion: WFL  Extension: -4 degrees   Forearm supination: 51 degrees   Forearm pronation: 47 degrees     Right Wrist   Wrist flexion: WFL  Wrist extension: WFL  Radial deviation: WFL  Ulnar deviation: WFL     Additional Passive Range of Motion Details  PT notes continuation of decrease ROM and strength t/o the right elbow and wrist     Strength/Myotome Testing     Right Shoulder     Planes of Motion   Flexion: 4-   Extension: 4+   Abduction: 4-   Adduction: 4   External rotation at 90°: 4-   Internal rotation at 90°: 4     Right Elbow   Flexion: 3+  Extension: 4    Left Wrist/Hand   Normal wrist strength     (2nd hand position)     Trial 1: 110    Thumb Strength  Key/Lateral Pinch     Trial 1: 11    Right Wrist/Hand   Wrist extension: 4  Wrist flexion: 4  Radial deviation: 4+  Ulnar deviation: 4+     (2nd hand position)     Trial 1: 78    Thumb Strength   Key/Lateral Pinch     Trial 1: 6             Precautions:  MD protocol       Manuals 1/6 1/13 1/18 1/20 1/24   Right elbow, wrist, and forearm  15' 15' 15' 15' 15 min                            Neuro Re-Ed 1/6 1/13 1/18 1/20    S/L ER and ABD  2x10 ea Scap 4 Way 2x10 ea 4 Way   2x10 ea 4 way 1# 20x 4 way 1 5#   2x10 Each    Scapular pinch into wall  15x5" w/ arm in sling 15x5" no sling 15x5" 15x5" 15x5" Hold    Ball  whole hand and fingers only  20x Hoda Ugalde ball 20x ea Green ball 20x ea Green ball 20x ea Green ball 2x10 Each   Red Ball    AAROM Elbow Flex and ext, Pro/sup 10x ea 2x10  3"hold ea 2x10ea  3"hold 2x10 ea 3"hold HEP    Genie Ball  10x bilat 2x10 bilat 2x10 bilat [id number] 2x10 Red Foam Ball    Hammer Pro/Sup     2x10 Each    Scapular wall slides IYTA      10x Each                                    Ther Ex 1/6 1/13 1/18 1/20    UBE   3'/3' 10' 120resist alt 10 min   120 resist  Alt    Wrist flex and ext  10x ea 1# 2x10 ea 1# 2x10 ea 1# 2x10 ea 1# 2x10 Each   2#    Tputty   3' GREEN 3' GREEN 3' GREEN 3' GREEN 3 min Green    Tputty pinch 3' GREEN 3' GREEN 3' GREEN 3' GREEN 3 min Green    Tputty pinch and pull  3' GREEN 3' GREEN 3' GREEN 3' GREEN 3 min Green    Digiflex    20x 3 min Each   Green    Power Web    20x ea NT   Resisted Elbow Ext    2x10 RED 2x10 Red    HEP update/review         Ther Activity 1/6 1/13 1/18 1/20                    Gait Training 1/6 1/13 1/18 1/20                    Modalities 1/6 1/13 1/18 1/20    CP to the right elbow 15' 15' 15' 15' 15 min

## 2022-01-27 ENCOUNTER — OFFICE VISIT (OUTPATIENT)
Dept: PHYSICAL THERAPY | Facility: CLINIC | Age: 47
End: 2022-01-27
Payer: COMMERCIAL

## 2022-01-27 DIAGNOSIS — G89.29 CHRONIC RIGHT SHOULDER PAIN: ICD-10-CM

## 2022-01-27 DIAGNOSIS — S46.211A RUPTURE OF RIGHT DISTAL BICEPS TENDON, INITIAL ENCOUNTER: Primary | ICD-10-CM

## 2022-01-27 DIAGNOSIS — M25.521 RIGHT ELBOW PAIN: ICD-10-CM

## 2022-01-27 DIAGNOSIS — M25.511 CHRONIC RIGHT SHOULDER PAIN: ICD-10-CM

## 2022-01-27 DIAGNOSIS — Z09 S/P ORTHOPEDIC SURGERY, FOLLOW-UP EXAM: ICD-10-CM

## 2022-01-27 PROCEDURE — 97112 NEUROMUSCULAR REEDUCATION: CPT | Performed by: PHYSICAL THERAPIST

## 2022-01-27 PROCEDURE — 97110 THERAPEUTIC EXERCISES: CPT | Performed by: PHYSICAL THERAPIST

## 2022-01-27 PROCEDURE — 97140 MANUAL THERAPY 1/> REGIONS: CPT | Performed by: PHYSICAL THERAPIST

## 2022-01-27 NOTE — PROGRESS NOTES
Daily Note     Today's date: 2022  Patient name: Tyrel Pate  : 1975  MRN: [id number]  Referring provider: Rehan Arana  Dx:   Encounter Diagnosis     ICD-10-CM    1  Rupture of right distal biceps tendon, initial encounter  S46 211A    2  Right elbow pain  M25 521    3  S/P orthopedic surgery, follow-up exam  Z09    4  Chronic right shoulder pain  M25 511     G89 29                   Subjective:  Patient reports the elbow is a little stiff morning but no pain/tenderness  Patient reports increase soreness in the right shoulder with POC but overall feels the shoulder is moving better  Objective: See treatment diary below      Assessment: Tolerated treatment well  PT notes continuation of progression of POC with focus on strengthening and manual therapy to decrease pain levels and improve functional limitations to meet therapy goals  PT notes continuation of decrease ROM and strength t/o the right elbow and forearm with need for continuation of skilled therapy  Plan: Continue per plan of care        Precautions:  MD protocol       Manuals    Right elbow, wrist, and forearm  15' 15' 15 min  15 min with right shoulder ROM/stretching                         Neuro Re-Ed      S/L ER and ABD  4 Way   2x10 ea 4 way 1# 20x 4 way 1 5#   2x10 Each  4 way 1 5#   2x10 Each    Scapular pinch into wall  15x5" 15x5" 15x5" Hold  DC   Ball  whole hand and fingers only  20x ea Green ball 20x ea Green ball 2x10 Each   Red Ball  2x10 Each   [employer]    AAROM Elbow Flex and ext, Pro/sup 2x10ea  3"hold 2x10 ea 3"hold HEP     Genie Ball  2x10 bilat [id number] 2x10 Red Foam Ball  2x10 Red Weight Ball    Hammer Pro/Sup   2x10 Each  2x10 Each    Scapular wall slides IYTA    10x Each  10x Each    Wall Push ups     10x    Ball Into Wall     10x U/D, S/S, Circles  Red Foam Ball                 Ther Ex      UBE 3'/3' 10' 120resist alt 10 min   120 resist  Alt  10 min   100 resist  Alt    Wrist flex and ext  2x10 ea 1# 2x10 ea 1# 2x10 Each   2#  DC   Tputty   3' GREEN 3' GREEN 3 min Green  2 min   Blue    Tputty pinch 3' GREEN 3' GREEN 3 min Green  2 min   Blue    Tputty pinch and pull  3' GREEN 3' GREEN 3 min Green  2 min   Blue    Digiflex  20x 3 min Each   Green  2 min Each   [employer]    Power Web  20x ea NT    Resisted Elbow Ext  2x10 RED 2x10 Red  DC   HEP update/review        Ther Activity 1/18 1/20                   Gait Training 1/18 1/20                   Modalities 1/18 1/20     CP to the right elbow 15' 15' 15 min  15 min

## 2022-01-31 ENCOUNTER — OFFICE VISIT (OUTPATIENT)
Dept: PHYSICAL THERAPY | Facility: CLINIC | Age: 47
End: 2022-01-31
Payer: COMMERCIAL

## 2022-01-31 DIAGNOSIS — M25.521 RIGHT ELBOW PAIN: ICD-10-CM

## 2022-01-31 DIAGNOSIS — Z09 S/P ORTHOPEDIC SURGERY, FOLLOW-UP EXAM: ICD-10-CM

## 2022-01-31 DIAGNOSIS — G89.29 CHRONIC RIGHT SHOULDER PAIN: ICD-10-CM

## 2022-01-31 DIAGNOSIS — S46.211A RUPTURE OF RIGHT DISTAL BICEPS TENDON, INITIAL ENCOUNTER: Primary | ICD-10-CM

## 2022-01-31 DIAGNOSIS — M25.511 CHRONIC RIGHT SHOULDER PAIN: ICD-10-CM

## 2022-01-31 PROCEDURE — 97110 THERAPEUTIC EXERCISES: CPT | Performed by: PHYSICAL THERAPIST

## 2022-01-31 PROCEDURE — 97140 MANUAL THERAPY 1/> REGIONS: CPT | Performed by: PHYSICAL THERAPIST

## 2022-01-31 PROCEDURE — 97112 NEUROMUSCULAR REEDUCATION: CPT | Performed by: PHYSICAL THERAPIST

## 2022-01-31 NOTE — PROGRESS NOTES
Daily Note     Today's date: 2022  Patient name: Catherine Moscoso  : 1975  MRN: [id number]  Referring provider: Warren Maldonado  Dx:   Encounter Diagnosis     ICD-10-CM    1  Rupture of right distal biceps tendon, initial encounter  S46 211A    2  Right elbow pain  M25 521    3  S/P orthopedic surgery, follow-up exam  Z09    4  Chronic right shoulder pain  M25 511     G89 29                   Subjective:  Patient reports the shoulder is bothering him more than the arm at this point in time  Patient reports he is excited about starting the strengthening with the right elbow  Objective: See treatment diary below      Assessment: Tolerated treatment well  PT notes continuation of progression of POC with focus on strengthening and manual therapy to decrease symptoms and improve functional limitations to meet therapy goals  PT notes continuation of decrease ROM and strength t/o the right elbow and UE with need for continuation of skilled therapy  PT notes start of bicep strengthening as per MD protocol with no issues reported by patient  Plan: Continue per plan of care        Precautions:  MD protocol       Manuals    Right elbow, wrist, and forearm  15' 15 min  15 min with right shoulder ROM/stretching  15 min with Right Shoulder stretching                         Neuro Re-Ed       S/L ER and ABD  4 way 1# 20x 4 way 1 5#   2x10 Each  4 way 1 5#   2x10 Each  4 way 1 5#   2x10 Each    Scapular pinch into wall  15x5" 15x5" Hold  DC    Ball  whole hand and fingers only  20x ea Green ball 2x10 Each   Red Ball  2x10 Each   [employer]  DC   AAROM Elbow Flex and ext, Pro/sup 2x10 ea 3"hold HEP      Genie Ball  [id number] 2x10 Red Foam Ball  2x10 Red Weight Ball  2x10 Yellow Ball    Hammer Pro/Sup  2x10 Each  2x10 Each  Plus 1#  2x10    Scapular wall slides IYTA   10x Each  10x Each  15x Each    Wall Push ups    10x  15x    Ball Into Wall    10x U/D, S/S, Circles  Red Foam Ball 10x U/D, S/S,  Circles  Red Foam Ball                  Ther Ex 1/20      UBE 10' 120resist alt 10 min   120 resist  Alt  10 min   100 resist  Alt  10 min   100 resist   Alt    Wrist flex and ext  2x10 ea 1# 2x10 Each   2#  DC    Tputty   3' GREEN 3 min Green  2 min   Blue  DC   Tputty pinch 3' GREEN 3 min Green  2 min   Blue  DC   Tputty pinch and pull  3' GREEN 3 min Green  2 min   Blue  DC   Digiflex 20x 3 min Each   Green  2 min Each   Blue  3 min Blue    Power Web 20x ea NT     3 way Bicep Curls     3# 15x Each                         HEP update/review        Ther Activity 1/20                    Gait Training 1/20                    Modalities 1/20      CP to the right elbow 15' 15 min  15 min  15 min

## 2022-02-03 ENCOUNTER — OFFICE VISIT (OUTPATIENT)
Dept: PHYSICAL THERAPY | Facility: CLINIC | Age: 47
End: 2022-02-03
Payer: COMMERCIAL

## 2022-02-03 DIAGNOSIS — M25.511 CHRONIC RIGHT SHOULDER PAIN: ICD-10-CM

## 2022-02-03 DIAGNOSIS — G89.29 CHRONIC RIGHT SHOULDER PAIN: ICD-10-CM

## 2022-02-03 DIAGNOSIS — S46.211A RUPTURE OF RIGHT DISTAL BICEPS TENDON, INITIAL ENCOUNTER: Primary | ICD-10-CM

## 2022-02-03 DIAGNOSIS — M25.521 RIGHT ELBOW PAIN: ICD-10-CM

## 2022-02-03 DIAGNOSIS — Z09 S/P ORTHOPEDIC SURGERY, FOLLOW-UP EXAM: ICD-10-CM

## 2022-02-03 PROCEDURE — 97112 NEUROMUSCULAR REEDUCATION: CPT | Performed by: PHYSICAL THERAPIST

## 2022-02-03 PROCEDURE — 97110 THERAPEUTIC EXERCISES: CPT | Performed by: PHYSICAL THERAPIST

## 2022-02-03 PROCEDURE — 97140 MANUAL THERAPY 1/> REGIONS: CPT | Performed by: PHYSICAL THERAPIST

## 2022-02-03 NOTE — PROGRESS NOTES
Daily Note     Today's date: 2/3/2022  Patient name: Angie Guerrero  : 1975  MRN: [id number]  Referring provider: Maxim Rose  Dx:   Encounter Diagnosis     ICD-10-CM    1  Rupture of right distal biceps tendon, initial encounter  S46 211A    2  Right elbow pain  M25 521    3  S/P orthopedic surgery, follow-up exam  Z09    4  Chronic right shoulder pain  M25 511     G89 29                   Subjective:  Patient reports no issues in the elbow with starting bicep strengthening last session but states his UB and shoulder blade feel tight this morning with limitations with movement  Objective: See treatment diary below      Assessment: Tolerated treatment well  PT notes continuation of progression of POC with focus on strengthening and manual therapy to decrease symptoms and improve functional limitations to meet therapy goals  PT notes continuation of decrease ROM and strength t/o the right UE with need for continuation of skilled therapy  Plan: Continue per plan of care        Precautions:  MD protocol       Manuals  2/3   Right elbow, wrist, and forearm  15 min  15 min with right shoulder ROM/stretching  15 min with Right Shoulder stretching  15 min with Right Shoulder Stretching                         Neuro Re-Ed       S/L ER and ABD  4 way 1 5#   2x10 Each  4 way 1 5#   2x10 Each  4 way 1 5#   2x10 Each  4 way 1 5#   2x10 Each    Scapular pinch into wall  15x5" Hold  DC     Ball  whole hand and fingers only  2x10 Each   Red Ball  2x10 Each   Red Ball  DC    AAROM Elbow Flex and ext, Pro/sup HEP       Genie Ball  2x10 Red Foam Ball  2x10 Red Weight Ball  2x10 Yellow Ball  2x10 Yellow Ball    Hammer Pro/Sup 2x10 Each  2x10 Each  Plus 1#  2x10  Plus 1#  2x10    Scapular wall slides IYTA  10x Each  10x Each  15x Each  2x10 Each    Wall Push ups   10x  15x  2x10    Ball Into Wall   10x U/D, S/S, Circles  Red Foam Ball 10x U/D, S/S,  Circles  Red Foam Ball  10x Each All Directions    TB MTP and LTP    2x10 Each Blue           Ther Ex       UBE 10 min   120 resist  Alt  10 min   100 resist  Alt  10 min   100 resist   Alt  10 min   90 resist Alt    Wrist flex and ext  2x10 Each   2#  DC     Tputty   3 min Green  2 min   Blue  DC    Tputty pinch 3 min Green  2 min   Blue  DC    Tputty pinch and pull  3 min Green  2 min   Blue  DC    Digiflex 3 min Each   Green  2 min Each   Blue  3 min Blue  3 min Each   [employer]    Power Web NT      3 way Bicep Curls    3# 15x Each  3# 2x10   Each                         HEP update/review        Ther Activity                     Gait Training                     Modalities       CP to the right elbow 15 min  15 min  15 min  Declined

## 2022-02-07 ENCOUNTER — OFFICE VISIT (OUTPATIENT)
Dept: PHYSICAL THERAPY | Facility: CLINIC | Age: 47
End: 2022-02-07
Payer: COMMERCIAL

## 2022-02-07 DIAGNOSIS — G89.29 CHRONIC RIGHT SHOULDER PAIN: ICD-10-CM

## 2022-02-07 DIAGNOSIS — M25.521 RIGHT ELBOW PAIN: ICD-10-CM

## 2022-02-07 DIAGNOSIS — M25.511 CHRONIC RIGHT SHOULDER PAIN: ICD-10-CM

## 2022-02-07 DIAGNOSIS — Z09 S/P ORTHOPEDIC SURGERY, FOLLOW-UP EXAM: ICD-10-CM

## 2022-02-07 DIAGNOSIS — S46.211A RUPTURE OF RIGHT DISTAL BICEPS TENDON, INITIAL ENCOUNTER: Primary | ICD-10-CM

## 2022-02-07 PROCEDURE — 97140 MANUAL THERAPY 1/> REGIONS: CPT | Performed by: PHYSICAL THERAPIST

## 2022-02-07 PROCEDURE — 97112 NEUROMUSCULAR REEDUCATION: CPT | Performed by: PHYSICAL THERAPIST

## 2022-02-07 PROCEDURE — 97110 THERAPEUTIC EXERCISES: CPT | Performed by: PHYSICAL THERAPIST

## 2022-02-07 NOTE — PROGRESS NOTES
Daily Note     Today's date: 2022  Patient name: Kaci Perea  : 1975  MRN: [id number]  Referring provider: Kelly Randle  Dx:   Encounter Diagnosis     ICD-10-CM    1  Rupture of right distal biceps tendon, initial encounter  S46 211A    2  Right elbow pain  M25 521    3  S/P orthopedic surgery, follow-up exam  Z09    4  Chronic right shoulder pain  M25 511     G89 29                   Subjective:  Patient reports the arm is getting better everyday  Patient denies any pain or symptoms in the arm but states the bicep is still weak with limitations with lifting and carrying activities  Objective: See treatment diary below      Assessment: Tolerated treatment well  PT notes continuation of progression of POC with focus on strengthening and manual therapy to decrease symptoms and improve functional limitations to meet therapy goals  PT notes continuation of decrease ROM and strength t/o the right elbow and shoulder with need for continuation of skilled therapy  Plan: Continue per plan of care        Precautions:  MD protocol       Manuals 1/27 1/31 2/3 2/7   Right elbow, wrist, and forearm  15 min with right shoulder ROM/stretching  15 min with Right Shoulder stretching  15 min with Right Shoulder Stretching  15 min with right shoulder stretching                         Neuro Re-Ed       S/L ER and ABD  4 way 1 5#   2x10 Each  4 way 1 5#   2x10 Each  4 way 1 5#   2x10 Each  4 way 1 5#   2x10 Each    Scapular pinch into wall  DC      Ball  whole hand and fingers only  2x10 Each   Red Ball  DC     AAROM Elbow Flex and ext, Pro/sup       Genie Ball  2x10 Red Weight Ball  2x10 Yellow Ball  2x10 Yellow Ball  2x10 Yellow Ball    Hammer Pro/Sup 2x10 Each  Plus 1#  2x10  Plus 1#  2x10  Plus 2#   2x10    Scapular wall slides IYTA  10x Each  15x Each  2x10 Each  2x10 Each   Wall Push ups  10x  15x  2x10  Table Push ups 2x10    Ball Into Wall  10x U/D, S/S, Circles  Red Foam Ball 10x U/D, S/S,  Circles  Red Foam Ball  10x Each   All Directions  2x10 Each   Red Weight Ball  All Directions   TB MTP and LTP   2x10 Each Blue  2x10 Each   Black           Ther Ex       UBE 10 min   100 resist  Alt  10 min   100 resist   Alt  10 min   90 resist Alt  10 min   80 resist   Alt    Wrist flex and ext  DC      Tputty   2 min   Blue  DC     Tputty pinch 2 min   Blue  DC     Tputty pinch and pull  2 min   Blue  DC     Digiflex 2 min Each   Blue  3 min Blue  3 min Each   Blue  3 min Each   [employer]    Power Web       3 way Bicep Curls   3# 15x Each  3# 2x10   Each  5# 2x10   Each                         HEP update/review        Ther Activity                     Gait Training                     Modalities       CP to the right elbow 15 min  15 min  Declined  15 min

## 2022-02-10 ENCOUNTER — OFFICE VISIT (OUTPATIENT)
Dept: PHYSICAL THERAPY | Facility: CLINIC | Age: 47
End: 2022-02-10
Payer: COMMERCIAL

## 2022-02-10 DIAGNOSIS — M25.521 RIGHT ELBOW PAIN: ICD-10-CM

## 2022-02-10 DIAGNOSIS — G89.29 CHRONIC RIGHT SHOULDER PAIN: ICD-10-CM

## 2022-02-10 DIAGNOSIS — Z09 S/P ORTHOPEDIC SURGERY, FOLLOW-UP EXAM: ICD-10-CM

## 2022-02-10 DIAGNOSIS — S46.211A RUPTURE OF RIGHT DISTAL BICEPS TENDON, INITIAL ENCOUNTER: Primary | ICD-10-CM

## 2022-02-10 DIAGNOSIS — M25.511 CHRONIC RIGHT SHOULDER PAIN: ICD-10-CM

## 2022-02-10 PROCEDURE — 97112 NEUROMUSCULAR REEDUCATION: CPT

## 2022-02-10 PROCEDURE — 97140 MANUAL THERAPY 1/> REGIONS: CPT

## 2022-02-10 PROCEDURE — 97110 THERAPEUTIC EXERCISES: CPT

## 2022-02-10 NOTE — PROGRESS NOTES
Daily Note     Today's date: 2/10/2022  Patient name: Mily Gonzalez  : 1975  MRN: [id number]  Referring provider: Era Trujillo  Dx:   Encounter Diagnosis     ICD-10-CM    1  Rupture of right distal biceps tendon, initial encounter  S46 211A    2  Right elbow pain  M25 521    3  S/P orthopedic surgery, follow-up exam  Z09    4  Chronic right shoulder pain  M25 511     G89 29                   Subjective: No new c/o pain today  Objective: See treatment diary below      Assessment: Tolerated treatment well  Patient exhibited good technique with therapeutic exercises and would benefit from continued PT to increase R UE ROM/strength and endurance to improve mobility  Plan: Continue per plan of care        Precautions:  MD protocol       Manuals 1/27 1/31 2/3 2/7 2/10   Right elbow, wrist, and forearm  15 min with right shoulder ROM/stretching  15 min with Right Shoulder stretching  15 min with Right Shoulder Stretching  15 min with right shoulder stretching  15' R shoulder stretching                           Neuro Re-Ed     2/10   S/L ER and ABD  4 way 1 5#   2x10 Each  4 way 1 5#   2x10 Each  4 way 1 5#   2x10 Each  4 way 1 5#   2x10 Each  4way 1 5# 2x10ea   Scapular pinch into wall  DC       Ball  whole hand and fingers only  2x10 Each   Red Ball  DC      AAROM Elbow Flex and ext, Pro/sup        Genie Ball  2x10 Red Weight Ball  2x10 Yellow Ball  2x10 Yellow Ball  2x10 Yellow Ball  2x10 Yellow Ball   Hammer Pro/Sup 2x10 Each  Plus 1#  2x10  Plus 1#  2x10  Plus 2#   2x10  Plus 2# 2x10   Scapular wall slides IYTA  10x Each  15x Each  2x10 Each  2x10 Each 2x10ea   Wall Push ups  10x  15x  2x10  Table Push ups 2x10     Ball Into Wall  10x U/D, S/S, Circles  Red Foam Ball 10x U/D, S/S,  Circles  Red Foam Ball  10x Each   All Directions  2x10 Each   Red [employer] [place]  All Directions 2x10ea Red Weighted Ball all directions   TB MTP and LTP   2x10 Each Blue  2x10 Each   Black  2x10 BLACK Ther Ex     2/10   UBE 10 min   100 resist  Alt  10 min   100 resist   Alt  10 min   90 resist Alt  10 min   80 resist   Alt  10' 80resist alt   Wrist flex and ext  DC       Tputty   2 min   Blue  DC      Tputty pinch 2 min   Blue  DC      Tputty pinch and pull  2 min   Blue  DC      Digiflex 2 min Each   Blue  3 min Blue  3 min Each   Blue  3 min Each   Blue  3' ea BLUE   Power Web        3 way Bicep Curls   3# 15x Each  3# 2x10   Each  5# 2x10   Each  5# 2x10 ea                           HEP update/review         Ther Activity     2/10                   Gait Training     2/10                   Modalities     2/10   CP to the right elbow 15 min  15 min  Declined  15 min  15'

## 2022-02-14 ENCOUNTER — OFFICE VISIT (OUTPATIENT)
Dept: OBGYN CLINIC | Facility: OTHER | Age: 47
End: 2022-02-14

## 2022-02-14 VITALS
HEIGHT: 71 IN | DIASTOLIC BLOOD PRESSURE: 73 MMHG | BODY MASS INDEX: 26.35 KG/M2 | WEIGHT: 188.2 LBS | SYSTOLIC BLOOD PRESSURE: 109 MMHG | HEART RATE: 64 BPM

## 2022-02-14 DIAGNOSIS — Z47.89 AFTERCARE FOLLOWING SURGERY OF THE MUSCULOSKELETAL SYSTEM: Primary | ICD-10-CM

## 2022-02-14 PROCEDURE — 99024 POSTOP FOLLOW-UP VISIT: CPT | Performed by: PHYSICIAN ASSISTANT

## 2022-02-14 NOTE — PROGRESS NOTES
Assessment:       1  Aftercare following surgery of the musculoskeletal system          Plan:        Patient is doing well postoperatively and he is pleased with his progress in PT  All questions were addressed to the patient's satisfaction  Patient will continue rehab protocol in PT  Follow-up will be in 2 months to assess patient's progress  Subjective:     Patient ID: Golden Mcdonald is a 42 y o  male  Chief Complaint:    HPI    Patient presents to the office for follow-up status post distal biceps tendon repair on 12/21/2021  He reports he is doing well and is happy with his progress with PT  He has no new concerns  Social History     Occupational History    Not on file   Tobacco Use    Smoking status: Never Smoker    Smokeless tobacco: Never Used   Vaping Use    Vaping Use: Never used   Substance and Sexual Activity    Alcohol use: Yes     Comment: 4-5x weekly, one drink each time    Drug use: No    Sexual activity: Not on file     Comment: defer      Review of Systems   Constitutional: Negative  Respiratory: Negative  Musculoskeletal: Positive for myalgias  Negative for arthralgias  Skin: Positive for wound  Neurological: Positive for weakness  Negative for numbness  Psychiatric/Behavioral: Negative  Objective:     Ortho ExamPhysical Exam  HENT:      Head: Atraumatic  Cardiovascular:      Pulses: Normal pulses  Pulmonary:      Effort: Pulmonary effort is normal    Musculoskeletal:      Comments: Right elbow range of motion Patient lacks about 5° of full extension  Flexion to 100°  Painless pronation and supination  Surgical repair of distal biceps tendon repair palpable and intact  Skin:     General: Skin is warm and dry  Capillary Refill: Capillary refill takes less than 2 seconds  Comments: Surgical incision dry and clean, healed  Neurological:      Mental Status: He is alert and oriented to person, place, and time        Sensory: No sensory deficit     Psychiatric:         Mood and Affect: Mood normal          Behavior: Behavior normal

## 2022-02-15 ENCOUNTER — EVALUATION (OUTPATIENT)
Dept: PHYSICAL THERAPY | Facility: CLINIC | Age: 47
End: 2022-02-15
Payer: COMMERCIAL

## 2022-02-15 DIAGNOSIS — M25.511 CHRONIC RIGHT SHOULDER PAIN: ICD-10-CM

## 2022-02-15 DIAGNOSIS — G89.29 CHRONIC RIGHT SHOULDER PAIN: ICD-10-CM

## 2022-02-15 DIAGNOSIS — S46.211A RUPTURE OF RIGHT DISTAL BICEPS TENDON, INITIAL ENCOUNTER: Primary | ICD-10-CM

## 2022-02-15 DIAGNOSIS — Z09 S/P ORTHOPEDIC SURGERY, FOLLOW-UP EXAM: ICD-10-CM

## 2022-02-15 DIAGNOSIS — M25.521 RIGHT ELBOW PAIN: ICD-10-CM

## 2022-02-15 PROCEDURE — 97140 MANUAL THERAPY 1/> REGIONS: CPT | Performed by: PHYSICAL THERAPIST

## 2022-02-15 PROCEDURE — 97112 NEUROMUSCULAR REEDUCATION: CPT | Performed by: PHYSICAL THERAPIST

## 2022-02-15 PROCEDURE — 97110 THERAPEUTIC EXERCISES: CPT | Performed by: PHYSICAL THERAPIST

## 2022-02-15 NOTE — PROGRESS NOTES
PT Re-Evaluation     Today's date: 2/15/2022  Patient name: Mily Gonzalez  : 1975  MRN: [id number]  Referring provider: Una Stephen  Dx:   Encounter Diagnosis     ICD-10-CM    1  Rupture of right distal biceps tendon, initial encounter  S46 211A    2  Right elbow pain  M25 521    3  S/P orthopedic surgery, follow-up exam  Z09    4  Chronic right shoulder pain  M25 511     G89 29                   Assessment  Assessment details: PT notes the patient with continuation of decrease ROM and strength t/o the right bicep and UE s/p right distal rupture repair leading to increase pain levels and functional limitations with need for continuation of skilled therapy for 4 weeks with focus on strengthening, posture, manual therapy, analgesic modalities, and update/review of HEP  PT will comply with MD protocol and progress as tolerated by patient  Impairments: abnormal or restricted ROM, activity intolerance, impaired physical strength, lacks appropriate home exercise program, pain with function, weight-bearing intolerance and poor posture   Understanding of Dx/Px/POC: good   Prognosis: good    Goals  ST  Initiate HEP-MET  2  Decrease pain levels by 25-50%-MET   3  Increase strength by 1-2 mm grades-MET  4  Increase ROM by 25-50%-MET   LT  Improve postural awareness-Partial MET   2  Improve scapular stability-Partial MET   3  Decrease limitations with reaching, lifting and carrying-Partial MET  4  Decrease limitations with gripping and push/pull-Partial MET  5  Decrease limitations with ADL-Partial MET  6  RTW full duty-NOT MET  7   DC with HEP-Progressing       Plan  Plan details: PT notes review of POC and findings with patient who is in agreement with PT recommendations of continuation of skilled therapy      Patient would benefit from: skilled physical therapy  Planned modality interventions: cryotherapy  Planned therapy interventions: manual therapy, neuromuscular re-education, patient education, postural training, self care, strengthening, stretching, therapeutic exercise, home exercise program, graded exercise and flexibility  Frequency: 2x week  Duration in weeks: 4  Treatment plan discussed with: patient        Subjective Evaluation    History of Present Illness  Date of surgery: 2021  Mechanism of injury: surgery  Mechanism of injury: Patient reports catching a cat falling out of a tree  When he felt immediate pull and pain in the right elbow  Patient went to ortho MD for evaluation and found to have + distal bicep tear with need for surgical repair  Patient underwent the corrective procedure on 21 and is now p/o with orders for OPPT  Patient reports difficulty with gripping, lifting, carrying, ADL, work duties, recreation, reaching, and sleep  Patient reports he is presently OOW as  with significant PMH of right RC injury  Presently the patient has attended 13 sessions of skilled therapy and feels 60-70% improvement since the start of therapy  Patient reports the elbow and wrist are moving better with overall decrease pain levels  Patient reports the arm is still weak with limitations with lifting, carrying, OH, recreation and push/pull activities with need for continuation of skilled therapy      Pain  Current pain ratin  At best pain ratin  At worst pain rating: 3  Location: Right elbow  Quality: dull ache, discomfort, needle-like and pulling  Relieving factors: rest  Aggravating factors: overhead activity and lifting  Progression: improved      Diagnostic Tests  MRI studies: abnormal  Treatments  Current treatment: immobilization and physical therapy  Patient Goals  Patient goals for therapy: decreased pain, increased motion, increased strength, independence with ADLs/IADLs, return to sport/leisure activities and return to work          Objective     Postural Observations  Seated posture: fair  Standing posture: fair        Active Range of Motion     Right Shoulder   Flexion: 147 degrees with pain  Extension: WFL  Abduction: 151 degrees with pain  External rotation 90°: 62 degreeswith pain  Internal rotation 90°: 53 degrees with pain    Right Elbow   Flexion: WFL  Extension: -4 degrees   Forearm supination: WFL  Forearm pronation: WFL    Right Wrist   Wrist flexion: 54 degrees   Wrist extension: 52 degrees   Radial deviation: WFL  Ulnar deviation: WFL    Passive Range of Motion     Right Shoulder   Flexion: 167 degrees with pain  Abduction: 170 degrees with pain  External rotation 90°: 73 degrees with pain  Internal rotation 90°: 56 degrees with pain    Right Elbow   Flexion: WFL  Extension: -3 degrees     Right Wrist   Wrist flexion: WFL  Wrist extension: WFL  Radial deviation: WFL  Ulnar deviation: WFL     Additional Passive Range of Motion Details  PT notes continuation of decrease ROM and strength t/o the right elbow and wrist     Strength/Myotome Testing     Right Shoulder     Planes of Motion   Flexion: 4   Extension: 4+   Abduction: 4   Adduction: 4+   External rotation at 90°: 4   Internal rotation at 90°: 4+     Right Elbow   Flexion: 4-  Extension: 4+  Forearm supination: 4  Forearm pronation: 4+    Left Wrist/Hand   Normal wrist strength     (2nd hand position)     Trial 1: 110    Thumb Strength  Key/Lateral Pinch     Trial 1: 11    Right Wrist/Hand   Wrist extension: 4  Wrist flexion: 4  Radial deviation: 4+  Ulnar deviation: 4+     (2nd hand position)     Trial 1: 78    Thumb Strength   Key/Lateral Pinch     Trial 1: 6             Precautions:  MD protocol       Manuals 1/31 2/3 2/7 2/10 2/15   Right elbow, wrist, and forearm  15 min with Right Shoulder stretching  15 min with Right Shoulder Stretching  15 min with right shoulder stretching  15' R shoulder stretching 15 min with right shoulder stretching                            Neuro Re-Ed    2/10    S/L ER and ABD  4 way 1 5#   2x10 Each  4 way 1 5#   2x10 Each  4 way 1 5# 2x10 Each  4way 1 5# 2x10ea 4 way   1 5#   2x10 Each    Autumn Pull     2x10 15#    Autumn Push      2x10 20#    [employer]     2x10 10#    Genie Ball  2x10 Yellow Ball  2x10 Yellow Ball  2x10 Yellow Ball  2x10 Yellow Ball 2x10 Blue   Ball    Hammer Pro/Sup Plus 1#  2x10  Plus 1#  2x10  Plus 2#   2x10  Plus 2# 2x10 DC   Scapular wall slides IYTA  15x Each  2x10 Each  2x10 Each 2x10ea DC   Wall Push ups  15x  2x10  Table Push ups 2x10      Ball Into Wall  10x U/D, S/S,  Circles  Red Foam Ball  10x Each   All Directions  2x10 Each   Red Weight Ball  All Directions 2x10ea Red Weighted Ball all directions DC    TB MTP and LTP  2x10 Each Blue  2x10 Each   Black  2x10 BLACK 2x10 Black    [employer] Lat Pulldown     2x10 20#    [employer] Chop down     10x Bilat  15#    [employer] Chop Up      10x Bilat  10#    TB 90/90 IR and ER      2x10 Each   Red            Ther Ex    2/10    UBE 10 min   100 resist   Alt  10 min   90 resist Alt  10 min   80 resist   Alt  10' 80resist alt 10 min   70 resist  Alt    Wrist flex and ext         Tputty   DC       Tputty pinch DC       Tputty pinch and pull  DC       Digiflex 3 min Blue  3 min Each   Blue  3 min Each   Blue  3' ea BLUE DC   Power Web        3 way Bicep Curls  3# 15x Each  3# 2x10   Each  5# 2x10   Each  5# 2x10 ea DC                           HEP update/review         Ther Activity    2/10                    Gait Training    2/10                    Modalities    2/10    CP to the right elbow 15 min  Declined  15 min  15' 15 min with right shoulder

## 2022-02-17 ENCOUNTER — OFFICE VISIT (OUTPATIENT)
Dept: PHYSICAL THERAPY | Facility: CLINIC | Age: 47
End: 2022-02-17
Payer: COMMERCIAL

## 2022-02-17 DIAGNOSIS — S46.211A RUPTURE OF RIGHT DISTAL BICEPS TENDON, INITIAL ENCOUNTER: Primary | ICD-10-CM

## 2022-02-17 DIAGNOSIS — M25.511 CHRONIC RIGHT SHOULDER PAIN: ICD-10-CM

## 2022-02-17 DIAGNOSIS — Z09 S/P ORTHOPEDIC SURGERY, FOLLOW-UP EXAM: ICD-10-CM

## 2022-02-17 DIAGNOSIS — M25.521 RIGHT ELBOW PAIN: ICD-10-CM

## 2022-02-17 DIAGNOSIS — G89.29 CHRONIC RIGHT SHOULDER PAIN: ICD-10-CM

## 2022-02-17 PROCEDURE — 97112 NEUROMUSCULAR REEDUCATION: CPT

## 2022-02-17 PROCEDURE — 97110 THERAPEUTIC EXERCISES: CPT

## 2022-02-17 PROCEDURE — 97140 MANUAL THERAPY 1/> REGIONS: CPT

## 2022-02-17 NOTE — PROGRESS NOTES
Daily Note     Today's date: 2022  Patient name: Ross Castaneda  : 1975  MRN: [id number]  Referring provider: Ivan Fairchild  Dx:   Encounter Diagnosis     ICD-10-CM    1  Rupture of right distal biceps tendon, initial encounter  S46 211A    2  Right elbow pain  M25 521    3  S/P orthopedic surgery, follow-up exam  Z09    4  Chronic right shoulder pain  M25 511     G89 29                   Subjective: No new c/o pain today  "It feels good to get moving again "      Objective: See treatment diary below      Assessment: Tolerated treatment well  Patient exhibited good technique with therapeutic exercises and would benefit from continued PT to increase R UE ROM/strength and endurance to improve mobility  Plan: Continue per plan of care        Precautions:  MD protocol       Manuals 2/3 2/7 2/10 2/15 2/17   Right elbow, wrist, and forearm  15 min with Right Shoulder Stretching  15 min with right shoulder stretching  15' R shoulder stretching 15 min with right shoulder stretching  15' w/ R shoulder stretching                           Neuro Re-Ed   2/10  2/17   S/L ER and ABD  4 way 1 5#   2x10 Each  4 way 1 5#   2x10 Each  4way 1 5# 2x10ea 4 way   1 5#   2x10 Each  4Way 1 5# 2x10 ea   Autumn Pull    2x10 15#  2x10 20#   Vici Push     2x10 20#  2x10 20#   Autumn [employer]    2x10 10#  2x10 10#   Genie Ball  2x10 Yellow Ball  2x10 Yellow Ball  2x10 Yellow Ball 2x10 Blue   Ball  2x10 BLUE   Hammer Pro/Sup Plus 1#  2x10  Plus 2#   2x10  Plus 2# 2x10 DC    Scapular wall slides IYTA  2x10 Each  2x10 Each 2x10ea DC    Wall Push ups  2x10  Table Push ups 2x10       Ball Into Wall  10x Each   All Directions  2x10 Each   Red Weight Ball  All Directions 2x10ea Red Weighted Ball all directions DC     TB MTP and LTP 2x10 Each Blue  2x10 Each   Black  2x10 BLACK 2x10 Black  2x10 BLACK   Autumn Lat Pulldown    2x10 20#  2x10 20#   Autumn Chop down    10x Bilat  15#  10x bilat 15#   Vici Chop Up 10x Bilat  10#  10x bilat 10#   TB 90/90 IR and ER     2x10 Each   Red  2x10 ea RED           Ther Ex   2/10  2/17   UBE 10 min   90 resist Alt  10 min   80 resist   Alt  10' 80resist alt 10 min   70 resist  Alt  10' 70resist alt   Wrist flex and ext         Tputty          Tputty pinch        Tputty pinch and pull         Digiflex 3 min Each   Blue  3 min Each   Blue  3' ea BLUE DC    Power Web        3 way Bicep Curls  3# 2x10   Each  5# 2x10   Each  5# 2x10 ea DC                            HEP update/review         Ther Activity   2/10  2/17                   Gait Training   2/10  2/17                   Modalities   2/10  2/17   CP to the right elbow Declined  15 min  15' 15 min with right shoulder  15'w/R shoulder

## 2022-02-18 NOTE — PROGRESS NOTES
Daily Note     Today's date: 2022  Patient name: Ross Castaneda  : 1975  MRN: [id number]  Referring provider: Ivan Fairchild  Dx:   Encounter Diagnosis     ICD-10-CM    1  Rupture of right distal biceps tendon, initial encounter  S46 211A    2  Right elbow pain  M25 521    3  S/P orthopedic surgery, follow-up exam  Z09    4  Chronic right shoulder pain  M25 511     G89 29                   Subjective: No new c/o pain today  "I'm feeling pretty good "      Objective: See treatment diary below      Assessment: Tolerated treatment well  Patient exhibited good technique with therapeutic exercises and would benefit from continued PT to increase R UE ROM/strength and endurance to improve mobility  Plan: Continue per plan of care        Precautions:  MD protocol       Manuals 2/7 2/10 2/15 2/17 2/21   Right elbow, wrist, and forearm  15 min with right shoulder stretching  15' R shoulder stretching 15 min with right shoulder stretching  15' w/ R shoulder stretching 10'w/Rshld stretching                           Neuro Re-Ed  2/10  2/17 2/21   S/L ER and ABD  4 way 1 5#   2x10 Each  4way 1 5# 2x10ea 4 way   1 5#   2x10 Each  4Way 1 5# 2x10 ea 4 way 1 5# 2x10 ea   Juda Pull   2x10 15#  2x10 20# 2x10 20#   Juda Push    2x10 20#  2x10 20# 2x10 20#   Autumn  Press   2x10 10#  2x10 10# 2x10 20#   Genie Ball  2x10 Yellow Ball  2x10 Yellow Ball 2x10 Blue   Ball  2x10 BLUE 2x10 BLUE   Hammer Pro/Sup Plus 2#   2x10  Plus 2# 2x10 DC     Scapular wall slides IYTA  2x10 Each 2x10ea DC     Wall Push ups  Table Push ups 2x10        Ball Into Wall  2x10 Each   Red Weight Ball  All Directions 2x10ea Red Weighted Ball all directions DC      TB MTP and LTP 2x10 Each   Black  2x10 BLACK 2x10 Black  2x10 BLACK 2x10 BLACK   Autumn Lat Pulldown   2x10 20#  2x10 20# 2x10 20#   Juda Chop down   10x Bilat  15#  10x bilat 15# 2x10 bilat 15#   Juda Chop Up    10x Bilat  10#  10x bilat 10# 2x10 bilat 15#   TB 90/90 IR and ER    2x10 Each   Red  2x10 ea RED 2x10 ea RED           Ther Ex  2/10  2/17 2/21   UBE 10 min   80 resist   Alt  10' 80resist alt 10 min   70 resist  Alt  10' 70resist alt 10' 70resist alt   Wrist flex and ext         Tputty          Tputty pinch        Tputty pinch and pull         Digiflex 3 min Each   Blue  3' ea BLUE DC     Power Web        3 way Bicep Curls  5# 2x10   Each  5# 2x10 ea DC                             HEP update/review         Ther Activity  2/10  2/17 2/21                   Gait Training  2/10  2/17 2/21                   Modalities  2/10  2/17 2/21   CP to the right elbow 15 min  15' 15 min with right shoulder  15'w/R shoulder 15'MH to R shoulder and elbow

## 2022-02-21 ENCOUNTER — OFFICE VISIT (OUTPATIENT)
Dept: PHYSICAL THERAPY | Facility: CLINIC | Age: 47
End: 2022-02-21
Payer: COMMERCIAL

## 2022-02-21 DIAGNOSIS — M25.521 RIGHT ELBOW PAIN: ICD-10-CM

## 2022-02-21 DIAGNOSIS — M25.511 CHRONIC RIGHT SHOULDER PAIN: ICD-10-CM

## 2022-02-21 DIAGNOSIS — G89.29 CHRONIC RIGHT SHOULDER PAIN: ICD-10-CM

## 2022-02-21 DIAGNOSIS — S46.211A RUPTURE OF RIGHT DISTAL BICEPS TENDON, INITIAL ENCOUNTER: Primary | ICD-10-CM

## 2022-02-21 DIAGNOSIS — Z09 S/P ORTHOPEDIC SURGERY, FOLLOW-UP EXAM: ICD-10-CM

## 2022-02-21 PROCEDURE — 97112 NEUROMUSCULAR REEDUCATION: CPT

## 2022-02-21 PROCEDURE — 97140 MANUAL THERAPY 1/> REGIONS: CPT

## 2022-02-21 PROCEDURE — 97110 THERAPEUTIC EXERCISES: CPT

## 2022-02-23 NOTE — PROGRESS NOTES
Daily Note     Today's date: 2022  Patient name: Kaci Perea  : 1975  MRN: [id number]  Referring provider: Christy Angulo  Dx:   Encounter Diagnosis     ICD-10-CM    1  Rupture of right distal biceps tendon, initial encounter  S46 211A    2  Right elbow pain  M25 521    3  S/P orthopedic surgery, follow-up exam  Z09    4  Chronic right shoulder pain  M25 511     G89 29                   Subjective: Pt reports his shoulder is a little sore from playing basketball with his son      Objective: See treatment diary below      Assessment:  Pt tolerated treatment session well today  Continuing to progress program in order to challenge ms strength and endurance  He reports fatigue with increase in resistance and less rest breaks between sets  Pt would benefit from continued OP PT services  Plan: Continue per plan of care        Precautions:  MD protocol       Manuals 2/24 2/10 2/15 2/17 2/21   Right elbow, wrist, and forearm  10' 15' R shoulder stretching 15 min with right shoulder stretching  15' w/ R shoulder stretching 10'w/Rshld stretching                           Neuro Re-Ed  2/10  2/17 2/21   S/L ER and ABD  4 way 2# 2x10 ea 4way 1 5# 2x10ea 4 way   1 5#   2x10 Each  4Way 1 5# 2x10 ea 4 way 1 5# 2x10 ea   Autumn Pull 2x10 30#  2x10 15#  2x10 20# 2x10 20#   Hazen Push  2x10 30#  2x10 20#  2x10 20# 2x10 20#   Hazen  Press 2x10 30#  2x10 10#  2x10 10# 2x10 20#   Genie Ball  DC 2x10 Yellow Ball 2x10 Blue   Ball  2x10 BLUE 2x10 BLUE   Hammer Pro/Sup  Plus 2# 2x10 DC     Scapular wall slides IYTA   2x10ea DC     Wall Push ups         Ball Into Wall   2x10ea Red Weighted Ball all directions DC      TB MTP and LTP DC 2x10 BLACK 2x10 Black  2x10 BLACK 2x10 BLACK   Autumn Lat Pulldown 2x10 30#  2x10 20#  2x10 20# 2x10 20#   Autumn Chop down 2x10 bilat 20#  10x Bilat  15#  10x bilat 15# 2x10 bilat 15#   Hazen Chop Up  2x10 bilat 17#  10x Bilat  10#  10x bilat 10# 2x10 bilat 15#   TB 90/90 IR and ER  2x10 ea RED  2x10 Each   Red  2x10 ea RED 2x10 ea RED           Ther Ex  2/10  2/17 2/21   UBE 10' 80resist alt 10' 80resist alt 10 min   70 resist  Alt  10' 70resist alt 10' 70resist alt   Wrist flex and ext         Tputty          Tputty pinch        Tputty pinch and pull         Digiflex  3' ea BLUE DC     Power Web        3 way Bicep Curls   5# 2x10 ea DC                             HEP update/review         Ther Activity  2/10  2/17 2/21                   Gait Training  2/10  2/17 2/21                   Modalities  2/10  2/17 2/21   CP to the right elbow 15'MH to R shoulder and elbow 15' 15 min with right shoulder  15'w/R shoulder 15'MH to R shoulder and elbow

## 2022-02-24 ENCOUNTER — OFFICE VISIT (OUTPATIENT)
Dept: PHYSICAL THERAPY | Facility: CLINIC | Age: 47
End: 2022-02-24
Payer: COMMERCIAL

## 2022-02-24 DIAGNOSIS — G89.29 CHRONIC RIGHT SHOULDER PAIN: ICD-10-CM

## 2022-02-24 DIAGNOSIS — M25.521 RIGHT ELBOW PAIN: ICD-10-CM

## 2022-02-24 DIAGNOSIS — S46.211A RUPTURE OF RIGHT DISTAL BICEPS TENDON, INITIAL ENCOUNTER: Primary | ICD-10-CM

## 2022-02-24 DIAGNOSIS — M25.511 CHRONIC RIGHT SHOULDER PAIN: ICD-10-CM

## 2022-02-24 DIAGNOSIS — Z09 S/P ORTHOPEDIC SURGERY, FOLLOW-UP EXAM: ICD-10-CM

## 2022-02-24 PROCEDURE — 97140 MANUAL THERAPY 1/> REGIONS: CPT

## 2022-02-24 PROCEDURE — 97112 NEUROMUSCULAR REEDUCATION: CPT

## 2022-02-24 PROCEDURE — 97110 THERAPEUTIC EXERCISES: CPT

## 2022-02-25 NOTE — PROGRESS NOTES
Daily Note     Today's date: 2022  Patient name: Shasha Gonzalez  : 1975  MRN: [id number]  Referring provider: Tyrel Samuels  Dx:   Encounter Diagnosis     ICD-10-CM    1  Rupture of right distal biceps tendon, initial encounter  S46 211A    2  Right elbow pain  M25 521    3  S/P orthopedic surgery, follow-up exam  Z09    4  Chronic right shoulder pain  M25 511     G89 29                   Subjective: No new c/o pain today  Objective: See treatment diary below      Assessment: Tolerated treatment well  Patient exhibited good technique with therapeutic exercises and would benefit from continued PT to increase R UE ROM/strength and endurance to improve mobility  Plan: Continue per plan of care        Precautions:  MD protocol       Manuals    Right elbow, wrist, and forearm  10' 10'  15' w/ R shoulder stretching 10'w/Rshld stretching                           Neuro Re-Ed     S/L ER and ABD  4 way 2# 2x10 ea 4 way 2# 2x10 ea  4Way 1 5# 2x10 ea 4 way 1 5# 2x10 ea   Autumn Pull 2x10 30# 2x10 30#  2x10 20# 2x10 20#   Autumn Push  2x10 30# 2x10 30#  2x10 20# 2x10 20#   Autumn  Press 2x10 30# 2x10 30#  2x10 10# 2x10 20#   Genie Ball  DC   2x10 BLUE 2x10 BLUE   Hammer Pro/Sup        Scapular wall slides IYTA         Wall Push ups         Ball Into Wall         TB MTP and LTP DC   2x10 BLACK 2x10 BLACK   Autumn Lat Pulldown 2x10 30# 2x10 30#  2x10 20# 2x10 20#   [place] Chop down 2x10 bilat 20# 2x10 bilat 20#  10x bilat 15# 2x10 bilat 15#   Breeden Chop Up  2x10 bilat 20# 2x10 bilat 20#  10x bilat 10# 2x10 bilat 15#   TB 90/90 IR and ER  2x10 ea RED 2x10 ea RED  2x10 ea RED 2x10 ea RED           Ther Ex     UBE 10' 80resist alt 10' 80resist alt  10' 70resist alt 10' 70resist alt   Wrist flex and ext         Tputty          Tputty pinch        Tputty pinch and pull         Digiflex        Power Web        3 way Bicep Curls HEP update/review         Ther Activity  2/28 2/17 2/21                   Gait Training  2/28 2/17 2/21                   Modalities  2/28 2/17 2/21   CP to the right elbow 15'MH to R shoulder and elbow 15' R shoulder and elbow  15'w/R shoulder 15'MH to R shoulder and elbow

## 2022-02-28 ENCOUNTER — OFFICE VISIT (OUTPATIENT)
Dept: PHYSICAL THERAPY | Facility: CLINIC | Age: 47
End: 2022-02-28
Payer: COMMERCIAL

## 2022-02-28 DIAGNOSIS — M25.521 RIGHT ELBOW PAIN: ICD-10-CM

## 2022-02-28 DIAGNOSIS — M25.511 CHRONIC RIGHT SHOULDER PAIN: ICD-10-CM

## 2022-02-28 DIAGNOSIS — S46.211A RUPTURE OF RIGHT DISTAL BICEPS TENDON, INITIAL ENCOUNTER: Primary | ICD-10-CM

## 2022-02-28 DIAGNOSIS — G89.29 CHRONIC RIGHT SHOULDER PAIN: ICD-10-CM

## 2022-02-28 DIAGNOSIS — Z09 S/P ORTHOPEDIC SURGERY, FOLLOW-UP EXAM: ICD-10-CM

## 2022-02-28 PROCEDURE — 97112 NEUROMUSCULAR REEDUCATION: CPT

## 2022-02-28 PROCEDURE — 97110 THERAPEUTIC EXERCISES: CPT

## 2022-02-28 PROCEDURE — 97140 MANUAL THERAPY 1/> REGIONS: CPT

## 2022-03-02 NOTE — PROGRESS NOTES
Daily Note     Today's date: 3/3/2022  Patient name: Mily Gonzalez  : 1975  MRN: [id number]  Referring provider: Una Stephen  Dx:   Encounter Diagnosis     ICD-10-CM    1  Rupture of right distal biceps tendon, initial encounter  S46 211A    2  Right elbow pain  M25 521    3  S/P orthopedic surgery, follow-up exam  Z09    4  Chronic right shoulder pain  M25 511     G89 29                   Subjective: "I aggravated my arm a little  My son and I had to move a pool heater down our driveway to our house  Its a little sore at my elbow/forearm, but nothing real bad "      Objective: See treatment diary below      Assessment: Tolerated treatment well  Patient exhibited good technique with therapeutic exercises and would benefit from continued PT to increase R UE ROM/strength and endurance to improve mobility  Plan: Continue per plan of care        Precautions:  MD protocol       Manuals 2/24 2/28 3/3  2/21   Right elbow, wrist, and forearm  10' 10' 15'  10'w/Rshld stretching                           Neuro Re-Ed  2/28 3/3  2/21   S/L ER and ABD  4 way 2# 2x10 ea 4 way 2# 2x10 ea 4way 2# 2x10 ea  4 way 1 5# 2x10 ea   [place] Pull 2x10 30# 2x10 30# 20x 30#  2x10 20#   [place] Push  2x10 30# 2x10 30# 20x 30#  2x10 20#   [place]  Press 2x10 30# 2x10 30# 2x10 30#  2x10 20#   Genie Ball  DC    2x10 BLUE   Hammer Pro/Sup        Scapular wall slides IYTA         Wall Push ups         Ball Into Wall         TB MTP and LTP DC    2x10 BLACK   [place] Lat Pulldown 2x10 30# 2x10 30# 2x10 30#  2x10 20#   Autumn Chop down 2x10 bilat 20# 2x10 bilat 20# 2x10 bilat 20#  2x10 bilat 15#   [place] Chop Up  2x10 bilat 20# 2x10 bilat 20# 2x10 bilat 20#  2x10 bilat 15#   TB 90/90 IR and ER  2x10 ea RED 2x10 ea RED 2x10 ea RED GREEN 2x10 ea RED           Ther Ex  2/28 3/3  2/21   UBE 10' 80resist alt 10' 80resist alt 10' 70resist alt  10' 70resist alt   Wrist flex and ext         Tputty          Tputty pinch Tputty pinch and pull         Digiflex        Power Web        3 way Bicep Curls                                 HEP update/review         Ther Activity  2/28 3/3  2/21                   Gait Training  2/28 3/3  2/21                   Modalities  2/28 3/3  2/21   CP to the right elbow 15'MH to R shoulder and elbow 15' R shoulder and elbow 15' R shoulder and elbow  15'MH to R shoulder and elbow

## 2022-03-03 ENCOUNTER — OFFICE VISIT (OUTPATIENT)
Dept: PHYSICAL THERAPY | Facility: CLINIC | Age: 47
End: 2022-03-03
Payer: COMMERCIAL

## 2022-03-03 DIAGNOSIS — M25.511 CHRONIC RIGHT SHOULDER PAIN: ICD-10-CM

## 2022-03-03 DIAGNOSIS — S46.211A RUPTURE OF RIGHT DISTAL BICEPS TENDON, INITIAL ENCOUNTER: Primary | ICD-10-CM

## 2022-03-03 DIAGNOSIS — Z09 S/P ORTHOPEDIC SURGERY, FOLLOW-UP EXAM: ICD-10-CM

## 2022-03-03 DIAGNOSIS — G89.29 CHRONIC RIGHT SHOULDER PAIN: ICD-10-CM

## 2022-03-03 DIAGNOSIS — M25.521 RIGHT ELBOW PAIN: ICD-10-CM

## 2022-03-03 PROCEDURE — 97140 MANUAL THERAPY 1/> REGIONS: CPT

## 2022-03-03 PROCEDURE — 97110 THERAPEUTIC EXERCISES: CPT

## 2022-03-03 PROCEDURE — 97112 NEUROMUSCULAR REEDUCATION: CPT

## 2022-03-06 NOTE — PROGRESS NOTES
Daily Note     Today's date: 3/7/2022  Patient name: Isadora Gaucher  : 1975  MRN: [id number]  Referring provider: Osmani Schulz  Dx:   Encounter Diagnosis     ICD-10-CM    1  Rupture of right distal biceps tendon, initial encounter  S46 211A    2  Right elbow pain  M25 521    3  S/P orthopedic surgery, follow-up exam  Z09    4  Chronic right shoulder pain  M25 511     G89 29                   Subjective: ***      Objective: See treatment diary below      Assessment: Tolerated treatment {Tolerated treatment :[id number]}  PT notes continuation of decrease ROM and strength t/o the right shoulder and UE with need for continuation of skilled therapy  Plan: Continue per plan of care        Precautions:  MD protocol       Manuals 2/24 2/28 3/3 3/7    Right elbow, wrist, and forearm  10' 10' 15'                             Neuro Re-Ed  2/28 3/3     S/L ER and ABD  4 way 2# 2x10 ea 4 way 2# 2x10 ea 4way 2# 2x10 ea     Autumn Pull 2x10 30# 2x10 30# 20x 30#     Autumn Push  2x10 30# 2x10 30# 20x 30#     Autumn  Press 2x10 30# 2x10 30# 2x10 30#     Genie Ball  DC       Hammer Pro/Sup        Scapular wall slides IYTA         Wall Push ups         Ball Into Wall         TB MTP and LTP DC       [place] Lat Pulldown 2x10 30# 2x10 30# 2x10 30#     Autumn Chop down 2x10 bilat 20# 2x10 bilat 20# 2x10 bilat 20#     Autumn Chop Up  2x10 bilat 20# 2x10 bilat 20# 2x10 bilat 20#     TB 90/90 IR and ER  2x10 ea RED 2x10 ea RED 2x10 ea RED GREEN            Ther Ex  2/28 3/3     UBE 10' 80resist alt 10' 80resist alt 10' 70resist alt     Wrist flex and ext         Tputty          Tputty pinch        Tputty pinch and pull         Digiflex        Power Web        3 way Bicep Curls                                 HEP update/review         Ther Activity   3/3                     Gait Training  2/28 3/3                     Modalities  2/28 3/3     CP to the right elbow 15'MH to R shoulder and elbow 15' R shoulder and elbow 15' R shoulder and elbow

## 2022-03-07 ENCOUNTER — EVALUATION (OUTPATIENT)
Dept: PHYSICAL THERAPY | Facility: CLINIC | Age: 47
End: 2022-03-07
Payer: COMMERCIAL

## 2022-03-07 DIAGNOSIS — S46.211A RUPTURE OF RIGHT DISTAL BICEPS TENDON, INITIAL ENCOUNTER: Primary | ICD-10-CM

## 2022-03-07 DIAGNOSIS — Z09 S/P ORTHOPEDIC SURGERY, FOLLOW-UP EXAM: ICD-10-CM

## 2022-03-07 DIAGNOSIS — M25.521 RIGHT ELBOW PAIN: ICD-10-CM

## 2022-03-07 DIAGNOSIS — G89.29 CHRONIC RIGHT SHOULDER PAIN: ICD-10-CM

## 2022-03-07 DIAGNOSIS — M25.511 CHRONIC RIGHT SHOULDER PAIN: ICD-10-CM

## 2022-03-07 PROCEDURE — 97140 MANUAL THERAPY 1/> REGIONS: CPT | Performed by: PHYSICAL THERAPIST

## 2022-03-07 PROCEDURE — 97112 NEUROMUSCULAR REEDUCATION: CPT | Performed by: PHYSICAL THERAPIST

## 2022-03-07 PROCEDURE — 97110 THERAPEUTIC EXERCISES: CPT | Performed by: PHYSICAL THERAPIST

## 2022-03-07 NOTE — PROGRESS NOTES
PT Re-Evaluation     Today's date: 3/7/2022  Patient name: Golden Mcdonald  : 1975  MRN: [id number]  Referring provider: Jessica Cuellar  Dx:   Encounter Diagnosis     ICD-10-CM    1  Rupture of right distal biceps tendon, initial encounter  S46 211A    2  Right elbow pain  M25 521    3  S/P orthopedic surgery, follow-up exam  Z09    4  Chronic right shoulder pain  M25 511     G89 29                   Assessment  Assessment details: PT notes the patient with continuation of decrease ROM and strength t/o the right bicep and UE s/p right distal rupture repair leading to increase pain levels and functional limitations with need for continuation of skilled therapy for 4 weeks with focus on strengthening, posture, manual therapy, analgesic modalities, and transition to HEP  PT will comply with MD protocol and progress as tolerated by patient  Impairments: abnormal or restricted ROM, activity intolerance, impaired physical strength, lacks appropriate home exercise program, pain with function, weight-bearing intolerance and poor posture   Understanding of Dx/Px/POC: good   Prognosis: good    Goals  ST  Initiate HEP-MET  2  Decrease pain levels by 25-50%-MET   3  Increase strength by 1-2 mm grades-MET  4  Increase ROM by 25-50%-MET   LT  Improve postural awareness-Partial MET   2  Improve scapular stability-Partial MET   3  Decrease limitations with reaching, lifting and carrying-Partial MET  4  Decrease limitations with gripping and push/pull-Partial MET  5  Decrease limitations with ADL-Partial MET  6  RTW full duty-MET  7   DC with HEP-Progressing       Plan  Plan details: Transition to HEP     Patient would benefit from: skilled physical therapy  Planned modality interventions: cryotherapy  Planned therapy interventions: manual therapy, neuromuscular re-education, patient education, postural training, self care, strengthening, stretching, therapeutic exercise, home exercise program, graded exercise and flexibility  Frequency: 2x week  Duration in weeks: 4  Treatment plan discussed with: patient        Subjective Evaluation    History of Present Illness  Date of surgery: 2021  Mechanism of injury: surgery  Mechanism of injury: Patient reports catching a cat falling out of a tree  When he felt immediate pull and pain in the right elbow  Patient went to ortho MD for evaluation and found to have + distal bicep tear with need for surgical repair  Patient underwent the corrective procedure on 21 and is now p/o with orders for OPPT  Patient reports difficulty with gripping, lifting, carrying, ADL, work duties, recreation, reaching, and sleep  Patient reports he is presently OOW as  with significant PMH of right RC injury  Presently the patient has attended 19 sessions of skilled therapy and feels overall improvement but is unable to quantify improvement  Patient reports the elbow and wrist are moving better with overall decrease pain levels  Patient reports the arm is still weak with limitations with lifting, carrying, OH, recreation and push/pull activities with need for continuation of skilled therapy      Pain  Current pain ratin  At best pain ratin  At worst pain ratin  Location: Right shoulder   Quality: dull ache, discomfort and tight  Relieving factors: rest  Aggravating factors: overhead activity and lifting  Progression: improved      Diagnostic Tests  MRI studies: abnormal  Treatments  Current treatment: immobilization and physical therapy  Patient Goals  Patient goals for therapy: decreased pain, increased motion, increased strength, independence with ADLs/IADLs, return to sport/leisure activities and return to work          Objective     Postural Observations  Seated posture: fair  Standing posture: fair        Active Range of Motion     Right Shoulder   Flexion: 158 degrees   Extension: WFL  Abduction: 163 degrees   External rotation 90°: 72 degreeswith pain  Internal rotation 90°: 59 degrees with pain    Right Elbow   Normal active range of motion  Flexion: WFL  Extension: WFL  Forearm supination: WFL  Forearm pronation: WFL    Right Wrist   Wrist flexion: WFL  Wrist extension: WFl  Radial deviation: WFL  Ulnar deviation: WFL    Passive Range of Motion     Right Shoulder   Flexion: WFL  Abduction: WFL  External rotation 90°: 81 degrees   Internal rotation 90°: 62 degrees     Right Elbow   Flexion: WFL  Extension: WFL    Right Wrist   Wrist flexion: WFL  Wrist extension: WFL  Radial deviation: WFL  Ulnar deviation: WFL     Strength/Myotome Testing     Right Shoulder     Planes of Motion   Flexion: 4   Extension: 4+   Abduction: 4+   Adduction: 5   External rotation at 90°: 4+   Internal rotation at 90°: 4+     Right Elbow   Flexion: 4  Extension: 5  Forearm supination: 4+  Forearm pronation: 5    Left Wrist/Hand   Normal wrist strength     (2nd hand position)     Trial 1: 110    Thumb Strength  Key/Lateral Pinch     Trial 1: 11    Right Wrist/Hand   Wrist extension: 4+  Wrist flexion: 4+  Radial deviation: 5  Ulnar deviation: 5     (2nd hand position)     Trial 1: 85    Thumb Strength   Key/Lateral Pinch     Trial 1: 10             Precautions:  MD protocol       Manuals 2/24 2/28 3/3 3/7    Right elbow, wrist, and forearm  10' 10' 15' 10 min                             Neuro Re-Ed  2/28 3/3     S/L ER and ABD  4 way 2# 2x10 ea 4 way 2# 2x10 ea 4way 2# 2x10 ea 4 way 2#  2x10     [place] Pull 2x10 30# 2x10 30# 20x 30# 20x 35#     [place] Push  2x10 30# 2x10 30# 20x 30# 20X 35#     [place]umn  Press 2x10 30# 2x10 30# 2x10 30# 2x10 35#     Genie Ball  DC       Hammer Pro/Sup        Scapular wall slides IYTA         Wall Push ups         Ball Into Wall         TB MTP and LTP DC       [place] Lat Pulldown 2x10 30# 2x10 30# 2x10 30# 2x10 35#     [place] Chop down 2x10 bilat 20# 2x10 bilat 20# 2x10 bilat 20# 2x10 Bilat  25#     [place] Chop Up  2x10 bilat 20# 2x10 bilat 20# 2x10 bilat 20# 2x10 Bilat  25#     TB 90/90 IR and ER  2x10 ea RED 2x10 ea RED 2x10 ea RED GREEN  2x10 Each     Weighted ball chest pass                                Ther Ex  2/28 3/3     UBE 10' 80resist alt 10' 80resist alt 10' 70resist alt 10 min   60 resist  Alt     Wrist flex and ext         Tputty          Tputty pinch        Tputty pinch and pull         Digiflex        Power Web        2 way Bicep Curls     2x5 Each   15# DB                             HEP update/review         Ther Activity  2/28 3/3                     Gait Training  2/28 3/3                     Modalities  2/28 3/3     CP to the right elbow 15'MH to R shoulder and elbow 15' R shoulder and elbow 15' R shoulder and elbow 15 min   Right Shoulder and elbow

## 2022-03-09 NOTE — PROGRESS NOTES
Daily Note     Today's date: 3/10/2022  Patient name: Ross Castaneda  : 1975  MRN: [id number]  Referring provider: Ivan Fairchild  Dx:   Encounter Diagnosis     ICD-10-CM    1  Rupture of right distal biceps tendon, initial encounter  S46 211A    2  Right elbow pain  M25 521    3  S/P orthopedic surgery, follow-up exam  Z09    4  Chronic right shoulder pain  M25 511     G89 29                   Subjective:  Patient reports soreness in the right elbow and shoulder after the last session for several hours  Patient reports the arm is still weak with difficulty with recreational activities  Objective: See treatment diary below      Assessment: Tolerated treatment well  PT notes continuation of decrease ROM and strength t/o the right shoulder and elbow with need for continuation of skilled therapy  Plan: Continue per plan of care        Precautions:  MD protocol       Manuals 2/24 2/28 3/3 3/7 3/10   Right scapular mobs and GH stretching  10' 10' 15' 10 min  10 min                            Neuro Re-Ed  2/28 3/3     S/L ER and ABD  4 way 2# 2x10 ea 4 way 2# 2x10 ea 4way 2# 2x10 ea 4 way 2#  2x10  HEP    [place] Pull 2x10 30# 2x10 30# 20x 30# 20x 35#  20x 35#    [place] Push  2x10 30# 2x10 30# 20x 30# 20X 35#  20x 35#    [place]umn  Press 2x10 30# 2x10 30# 2x10 30# 2x10 35#  2x10   35#    Genie Ball  DC       [place] Pro/Sup        Scapular wall slides IYTA         Wall Push ups         Ball Into Wall         TB MTP and LTP DC       [place] Lat Pulldown 2x10 30# 2x10 30# 2x10 30# 2x10 35#  2x10   40#    [place] Chop down 2x10 bilat 20# 2x10 bilat 20# 2x10 bilat 20# 2x10 Bilat  25#  2x10 Bilat  25#   [place] Chop Up  2x10 bilat 20# 2x10 bilat 20# 2x10 bilat 20# 2x10 Bilat  25#  2x10 Bilat  25#    TB 90/90 IR and ER  2x10 ea RED 2x10 ea RED 2x10 ea RED GREEN  2x10 Each  2x10 Each   Green    Weighted ball chest pass    10x 4# and 7#     [place] upright row      2x10   15#                    Ther Ex  2/28 3/3     UBE 10' 80resist alt 10' 80resist alt 10' 70resist alt 10 min   60 resist  Alt  10 min   60 resist  Alt    Wrist flex and ext         Tputty          Tputty pinch        Tputty pinch and pull         Digiflex        Power Web        2 way Bicep Curls     2x5 Each   15# DB  2x10 Each   15# DB                           HEP update/review         Ther Activity  2/28 3/3                     Gait Training  2/28 3/3                     Modalities  2/28 3/3     CP to the right elbow 15'MH to R shoulder and elbow 15' R shoulder and elbow 15' R shoulder and elbow 15 min   Right Shoulder and elbow 15 min with Right shoulder and elbow

## 2022-03-10 ENCOUNTER — OFFICE VISIT (OUTPATIENT)
Dept: PHYSICAL THERAPY | Facility: CLINIC | Age: 47
End: 2022-03-10
Payer: COMMERCIAL

## 2022-03-10 DIAGNOSIS — Z09 S/P ORTHOPEDIC SURGERY, FOLLOW-UP EXAM: ICD-10-CM

## 2022-03-10 DIAGNOSIS — M25.521 RIGHT ELBOW PAIN: ICD-10-CM

## 2022-03-10 DIAGNOSIS — G89.29 CHRONIC RIGHT SHOULDER PAIN: ICD-10-CM

## 2022-03-10 DIAGNOSIS — S46.211A RUPTURE OF RIGHT DISTAL BICEPS TENDON, INITIAL ENCOUNTER: Primary | ICD-10-CM

## 2022-03-10 DIAGNOSIS — M25.511 CHRONIC RIGHT SHOULDER PAIN: ICD-10-CM

## 2022-03-10 PROCEDURE — 97140 MANUAL THERAPY 1/> REGIONS: CPT | Performed by: PHYSICAL THERAPIST

## 2022-03-10 PROCEDURE — 97112 NEUROMUSCULAR REEDUCATION: CPT | Performed by: PHYSICAL THERAPIST

## 2022-03-10 PROCEDURE — 97110 THERAPEUTIC EXERCISES: CPT | Performed by: PHYSICAL THERAPIST

## 2022-03-14 ENCOUNTER — APPOINTMENT (OUTPATIENT)
Dept: PHYSICAL THERAPY | Facility: CLINIC | Age: 47
End: 2022-03-14
Payer: COMMERCIAL

## 2022-03-16 ENCOUNTER — OFFICE VISIT (OUTPATIENT)
Dept: PHYSICAL THERAPY | Facility: CLINIC | Age: 47
End: 2022-03-16
Payer: COMMERCIAL

## 2022-03-16 DIAGNOSIS — S46.211A RUPTURE OF RIGHT DISTAL BICEPS TENDON, INITIAL ENCOUNTER: Primary | ICD-10-CM

## 2022-03-16 DIAGNOSIS — M25.511 CHRONIC RIGHT SHOULDER PAIN: ICD-10-CM

## 2022-03-16 DIAGNOSIS — M25.521 RIGHT ELBOW PAIN: ICD-10-CM

## 2022-03-16 DIAGNOSIS — G89.29 CHRONIC RIGHT SHOULDER PAIN: ICD-10-CM

## 2022-03-16 DIAGNOSIS — Z09 S/P ORTHOPEDIC SURGERY, FOLLOW-UP EXAM: ICD-10-CM

## 2022-03-16 PROCEDURE — 97140 MANUAL THERAPY 1/> REGIONS: CPT | Performed by: PHYSICAL THERAPIST

## 2022-03-16 PROCEDURE — 97110 THERAPEUTIC EXERCISES: CPT | Performed by: PHYSICAL THERAPIST

## 2022-03-16 PROCEDURE — 97112 NEUROMUSCULAR REEDUCATION: CPT | Performed by: PHYSICAL THERAPIST

## 2022-03-16 NOTE — PROGRESS NOTES
Daily Note     Today's date: 3/17/2022  Patient name: Humberto Rosenberg  : 1975  MRN: [id number]  Referring provider: Yisel Garg  Dx:   Encounter Diagnosis     ICD-10-CM    1  Rupture of right distal biceps tendon, initial encounter  S46 211A    2  Right elbow pain  M25 521    3  S/P orthopedic surgery, follow-up exam  Z09    4  Chronic right shoulder pain  M25 511     G89 29                   Subjective:  Patient reports the arm is feeling with minimal limitations with recreation but continuation of difficulty with lifting and carrying of heavier objects  Objective: See treatment diary below      Assessment: Tolerated treatment well  PT notes continuation of decrease ROM and strength t/o the right shoulder and UE with need for continuation of skilled therapy  Plan: Continue per plan of care        Precautions:  MD protocol       Manuals 3/3 3/7 3/10 3/16 3/17   Right scapular mobs and GH stretching  15' 10 min  10 min  10 min  10 min                            Neuro Re-Ed 3/3       S/L ER and ABD  4way 2# 2x10 ea 4 way 2#  2x10  HEP      [place] Pull 20x 30# 20x 35#  20x 35#  2x10 40#  2x10 40#    [place] Push  20x 30# 20X 35#  20x 35#  2x10 40#  2x10 40#    Autumn  Press 2x10 30# 2x10 35#  2x10   35#  2x10   35#  2x10   35#    TB OH Press and Hold      10x Bilat Direction  Black    Ball into wall Hold      3x 30 perturbations  Yellow Ball    Scapular wall slides IYTA         Wall Push ups         Ball Into Wall         TB MTP and LTP        [place] Lat Pulldown 2x10 30# 2x10 35#  2x10   40#  2x10   45# 2x10   45#    [place] Chop down 2x10 bilat 20# 2x10 Bilat  25#  2x10 Bilat  25# 2x10 Bilat 25#   2x10 Bilat  25#    [place] Chop Up  2x10 bilat 20# 2x10 Bilat  25#  2x10 Bilat  25#  2x10 Bilat  25#  2x10 Bilat 25#    TB 90/90 IR and ER  2x10 ea RED GREEN  2x10 Each  2x10 Each   Green  2x10 Each   Green  DC   Weighted ball chest pass  10x 4# and 7#   OH Slam Ball 3 way   10x 7#  3 way 10x 7#    [place] upright row    2x10   15#  2x10   15#  2x10   20#                    Ther Ex 3/3       UBE 10' 70resist alt 10 min   60 resist  Alt  10 min   60 resist  Alt  10 min   60 resist  Alt  10 min   60 resist  Alt    Wrist flex and ext         Tputty          Tputty pinch        Tputty pinch and pull         Digiflex        Power Web        2 way Bicep Curls   2x5 Each   15# DB  2x10 Each   15# DB 2x10 Each   15# DB  2x10 Each   15# DB                            HEP update/review         Ther Activity 3/3                       Gait Training 3/3                       Modalities 3/3       CP to the right elbow 15' R shoulder and elbow 15 min   Right Shoulder and elbow 15 min with Right shoulder and elbow 15 min with right shoulder and elbow  Declined

## 2022-03-16 NOTE — PROGRESS NOTES
Daily Note     Today's date: 3/16/2022  Patient name: Ml Canela  : 1975  MRN: [id number]  Referring provider: Pete Pena  Dx:   Encounter Diagnosis     ICD-10-CM    1  Rupture of right distal biceps tendon, initial encounter  S46 211A    2  Right elbow pain  M25 521    3  S/P orthopedic surgery, follow-up exam  Z09    4  Chronic right shoulder pain  M25 511     G89 29                   Subjective:  Patient reports decrease fear with use of the arm with ADL and recreation with decrease symptoms and increase strength  Objective: See treatment diary below      Assessment: Tolerated treatment well  PT notes continuation of decrease ROM and strength t/o the right shoulder and UE with need for continuation of skilled therapy  Plan: Continue per plan of care        Precautions:  MD protocol       Manuals 2/28 3/3 3/7 3/10 3/16   Right scapular mobs and GH stretching  10' 15' 10 min  10 min  10 min                            Neuro Re-Ed 2/28 3/3      S/L ER and ABD  4 way 2# 2x10 ea 4way 2# 2x10 ea 4 way 2#  2x10  HEP     [place] Pull 2x10 30# 20x 30# 20x 35#  20x 35#  2x10 40#    [place] Push  2x10 30# 20x 30# 20X 35#  20x 35#  2x10 40#    [place]  Press 2x10 30# 2x10 30# 2x10 35#  2x10   35#  2x10   35#    Genie [employer] Pro/Sup        Scapular wall slides IYTA         Wall Push ups         Ball Into Wall         TB MTP and LTP        [place] Lat Pulldown 2x10 30# 2x10 30# 2x10 35#  2x10   40#  2x10   45#   [place] Chop down 2x10 bilat 20# 2x10 bilat 20# 2x10 Bilat  25#  2x10 Bilat  25# 2x10 Bilat 25#     [place] Chop Up  2x10 bilat 20# 2x10 bilat 20# 2x10 Bilat  25#  2x10 Bilat  25#  2x10 Bilat  25#    TB 90/90 IR and ER  2x10 ea RED 2x10 ea RED GREEN  2x10 Each  2x10 Each   Green  2x10 Each   Green    Weighted ball chest pass   10x 4# and 7#   OH Slam Ball 3 way   10x 7#    [place] upright row     2x10   15#  2x10   15#                    Ther Ex 2/28 3/3      UBE 10' 80resist alt 10' 70resist alt 10 min   60 resist  Alt  10 min   60 resist  Alt  10 min   60 resist  Alt    Wrist flex and ext         Tputty          Tputty pinch        Tputty pinch and pull         Digiflex        Power Web        2 way Bicep Curls    2x5 Each   15# DB  2x10 Each   15# DB 2x10 Each   15# DB                            HEP update/review         Ther Activity 2/28 3/3                      Gait Training 2/28 3/3                      Modalities 2/28 3/3      CP to the right elbow 15' R shoulder and elbow 15' R shoulder and elbow 15 min   Right Shoulder and elbow 15 min with Right shoulder and elbow 15 min with right shoulder and elbow

## 2022-03-17 ENCOUNTER — OFFICE VISIT (OUTPATIENT)
Dept: PHYSICAL THERAPY | Facility: CLINIC | Age: 47
End: 2022-03-17
Payer: COMMERCIAL

## 2022-03-17 DIAGNOSIS — Z09 S/P ORTHOPEDIC SURGERY, FOLLOW-UP EXAM: ICD-10-CM

## 2022-03-17 DIAGNOSIS — S46.211A RUPTURE OF RIGHT DISTAL BICEPS TENDON, INITIAL ENCOUNTER: Primary | ICD-10-CM

## 2022-03-17 DIAGNOSIS — M25.521 RIGHT ELBOW PAIN: ICD-10-CM

## 2022-03-17 DIAGNOSIS — M25.511 CHRONIC RIGHT SHOULDER PAIN: ICD-10-CM

## 2022-03-17 DIAGNOSIS — G89.29 CHRONIC RIGHT SHOULDER PAIN: ICD-10-CM

## 2022-03-17 PROCEDURE — 97140 MANUAL THERAPY 1/> REGIONS: CPT | Performed by: PHYSICAL THERAPIST

## 2022-03-17 PROCEDURE — 97112 NEUROMUSCULAR REEDUCATION: CPT | Performed by: PHYSICAL THERAPIST

## 2022-03-17 PROCEDURE — 97110 THERAPEUTIC EXERCISES: CPT | Performed by: PHYSICAL THERAPIST

## 2022-03-18 NOTE — PROGRESS NOTES
Daily Note     Today's date: 3/21/2022  Patient name: Papito Stahl  : 1975  MRN: [id number]  Referring provider: Vel Contreras  Dx:   Encounter Diagnosis     ICD-10-CM    1  Rupture of right distal biceps tendon, initial encounter  S46 211A    2  Right elbow pain  M25 521    3  S/P orthopedic surgery, follow-up exam  Z09    4  Chronic right shoulder pain  M25 511     G89 29                   Subjective: Pt reports he is feeling good today      Objective: See treatment diary below      Assessment:  Pt tolerated treatment session well  Improving UE strength but continues with ms endurance deficits with progressive activity  Pt would benefit from continued OP PT services  Plan: Continue per plan of care        Precautions:  MD protocol       Manuals 3/21 3/7 3/10 3/16 3/17   Right scapular mobs and GH stretching  10 min 10 min  10 min  10 min  10 min                            Neuro Re-Ed        S/L ER and ABD   4 way 2#  2x10  HEP      [place] Pull 2x10 40# 20x 35#  20x 35#  2x10 40#  2x10 40#    [place] Push  2x10 40# 20X 35#  20x 35#  2x10 40#  2x10 40#    [place]  Press 2x10   35#  2x10 35#  2x10   35#  2x10   35#  2x10   35#    TB OH Press and Hold  NT    10x Bilat Direction  Black    Ball into wall Hold  3x 30 perturbations  Yellow Ball    3x 30 perturbations  Yellow Ball    Scapular wall slides IYTA         Wall Push ups         Ball Into Wall         TB MTP and LTP        [place] Lat Pulldown 2x10   45#  2x10 35#  2x10   40#  2x10   45# 2x10   45#    [place] Chop down 2x10 Bilat  25#  2x10 Bilat  25#  2x10 Bilat  25# 2x10 Bilat 25#   2x10 Bilat  25#    [place] Chop Up  2x10 Bilat  25#  2x10 Bilat  25#  2x10 Bilat  25#  2x10 Bilat  25#  2x10 Bilat 25#    TB 90/90 IR and ER   GREEN  2x10 Each  2x10 Each   Green  2x10 Each   Green  DC   Weighted ball chest pass NT 10x 4# and 7#   OH Slam Ball 3 way   10x 7#  3 way   10x 7#    [place] upright row  2x10   20#  2x10   15#  2x10   15#  2x10 20#                    Ther Ex        UBE 10 min   60 resist  Alt  10 min   60 resist  Alt  10 min   60 resist  Alt  10 min   60 resist  Alt  10 min   60 resist  Alt    Wrist flex and ext         Tputty          Tputty pinch        Tputty pinch and pull         Digiflex        Power Web        2 way Bicep Curls  2x10 Each   15# DB  2x5 Each   15# DB  2x10 Each   15# DB 2x10 Each   15# DB  2x10 Each   15# DB                            HEP update/review         Ther Activity                        Gait Training                        Modalities        CP to the right elbow  15 min   Right Shoulder and elbow 15 min with Right shoulder and elbow 15 min with right shoulder and elbow  Declined

## 2022-03-21 ENCOUNTER — OFFICE VISIT (OUTPATIENT)
Dept: PHYSICAL THERAPY | Facility: CLINIC | Age: 47
End: 2022-03-21
Payer: COMMERCIAL

## 2022-03-21 DIAGNOSIS — Z09 S/P ORTHOPEDIC SURGERY, FOLLOW-UP EXAM: ICD-10-CM

## 2022-03-21 DIAGNOSIS — M25.521 RIGHT ELBOW PAIN: ICD-10-CM

## 2022-03-21 DIAGNOSIS — M25.511 CHRONIC RIGHT SHOULDER PAIN: ICD-10-CM

## 2022-03-21 DIAGNOSIS — S46.211A RUPTURE OF RIGHT DISTAL BICEPS TENDON, INITIAL ENCOUNTER: Primary | ICD-10-CM

## 2022-03-21 DIAGNOSIS — G89.29 CHRONIC RIGHT SHOULDER PAIN: ICD-10-CM

## 2022-03-21 PROCEDURE — 97140 MANUAL THERAPY 1/> REGIONS: CPT

## 2022-03-21 PROCEDURE — 97110 THERAPEUTIC EXERCISES: CPT

## 2022-03-21 PROCEDURE — 97112 NEUROMUSCULAR REEDUCATION: CPT

## 2022-03-24 ENCOUNTER — OFFICE VISIT (OUTPATIENT)
Dept: PHYSICAL THERAPY | Facility: CLINIC | Age: 47
End: 2022-03-24
Payer: COMMERCIAL

## 2022-03-24 DIAGNOSIS — M25.521 RIGHT ELBOW PAIN: ICD-10-CM

## 2022-03-24 DIAGNOSIS — S46.211A RUPTURE OF RIGHT DISTAL BICEPS TENDON, INITIAL ENCOUNTER: Primary | ICD-10-CM

## 2022-03-24 DIAGNOSIS — Z09 S/P ORTHOPEDIC SURGERY, FOLLOW-UP EXAM: ICD-10-CM

## 2022-03-24 DIAGNOSIS — G89.29 CHRONIC RIGHT SHOULDER PAIN: ICD-10-CM

## 2022-03-24 DIAGNOSIS — M25.511 CHRONIC RIGHT SHOULDER PAIN: ICD-10-CM

## 2022-03-24 PROCEDURE — 97110 THERAPEUTIC EXERCISES: CPT

## 2022-03-24 PROCEDURE — 97140 MANUAL THERAPY 1/> REGIONS: CPT

## 2022-03-24 PROCEDURE — 97112 NEUROMUSCULAR REEDUCATION: CPT

## 2022-03-24 NOTE — PROGRESS NOTES
Daily Note     Today's date: 3/24/2022  Patient name: Ross Castaneda  : 1975  MRN: [id number]  Referring provider: Joana Starr  Dx:   Encounter Diagnosis     ICD-10-CM    1  Rupture of right distal biceps tendon, initial encounter  S46 211A    2  Right elbow pain  M25 521    3  S/P orthopedic surgery, follow-up exam  Z09    4  Chronic right shoulder pain  M25 511     G89 29                   Subjective: No new c/o pain today  Objective: See treatment diary below      Assessment: Tolerated treatment well  Patient exhibited good technique with therapeutic exercises and would benefit from continued PT to increase R UE ROM/strength and endurance to improve mobility  Plan: Continue per plan of care        Precautions:  MD protocol       Manuals 3/21 3/24  3/16 3/17   Right scapular mobs and GH stretching  10 min 10'  10 min  10 min                            Neuro Re-Ed  3/24      S/L ER and ABD         [place] Pull 2x10 40# 2x10 40#  2x10 40#  2x10 40#    [place] Push  2x10 40# 2x10 40#  2x10 40#  2x10 40#    [place]  Press 2x10   35#  2x10 35#  2x10   35#  2x10   35#    TB OH Press and Hold  NT 10x bilat direction BLACK   10x Bilat Direction  Black    Ball into wall Hold  3x 30 perturbations  Yellow Ball 3x30 perturbations Yellow Ball   3x 30 perturbations  Yellow Ball    Scapular wall slides IYTA         Wall Push ups         Ball Into Wall         TB MTP and LTP        [place] Lat Pulldown 2x10   45#  2x10 45#  2x10   45# 2x10   45#    [place] Chop down 2x10 Bilat  25#  2x10 bilat 25#  2x10 Bilat 25#   2x10 Bilat  25#    [place] Chop Up  2x10 Bilat  25#  2x10 bilat 25#  2x10 Bilat  25#  2x10 Bilat 25#    TB 90/90 IR and ER     2x10 Each   Green  DC   OH Slam Ball 3 Way 7# NT 10x ea  OH Slam Ball 3 way   10x 7#  3 way   10x 7#    [place] upright row  2x10   20# 2x10 20#  2x10   15#  2x10   20#                    Ther Ex  3/24      UBE 10 min   60 resist  Alt  10' 60resist alt  10 min   60 resist  Alt  10 min   60 resist  Alt    Wrist flex and ext         Tputty          Tputty pinch        Tputty pinch and pull         Digiflex        Power Web        2 way Bicep Curls  2x10 Each   15# DB  3 Way 2x10 ea 20# DB,15#DB  2x10 Each   15# DB  2x10 Each   15# DB    IR Towel Stretch  3x15"                      HEP update/review         Ther Activity  3/24                      Gait Training  3/24                      Modalities  3/24      CP to the right elbow  15"   15 min with right shoulder and elbow  Declined

## 2022-03-27 NOTE — PROGRESS NOTES
Daily Note     Today's date: 3/28/2022  Patient name: Tyrel Pate  : 1975  MRN: [id number]  Referring provider: Rehan Arana  Dx:   Encounter Diagnosis     ICD-10-CM    1  Rupture of right distal biceps tendon, initial encounter  S46 211A    2  Right elbow pain  M25 521    3  S/P orthopedic surgery, follow-up exam  Z09    4  Chronic right shoulder pain  M25 511     G89 29                   Subjective:  Patient reports minimal to no limitations with right UE and is happy with current status  Patient feels he can transition to HEP next session  Objective: See treatment diary below      Assessment: Tolerated treatment well  PT notes the patient is approaching all therapy goals so PT will plan on transition to HEP next session  Plan: Potential discharge next visit       Precautions:  MD protocol       Manuals 3/21 3/24 3/28     Right scapular mobs and GH stretching  10 min 10' 10 min                              Neuro Re-Ed  3/24      S/L ER and ABD         [place] Pull 2x10 40# 2x10 40# 2x10 40#      [place] Push  2x10 40# 2x10 40# 2x10 40#      [place]  Press 2x10   35#  2x10 35# 2x10   35#      TB OH Press and Hold  NT 10x bilat direction BLACK 10x Bilat  Direction  Black      Ball into wall Hold  3x 30 perturbations  Yellow Ball 3x30 perturbations Yellow Ball DC     Scapular wall slides IYTA         Wall Push ups         Ball Into Wall         TB MTP and LTP        [place] Lat Pulldown 2x10   45#  2x10 45# 50#   2x10      [place]umn Chop down 2x10 Bilat  25#  2x10 bilat 25# 2x10 Bilat  25#      [place] Chop Up  2x10 Bilat  25#  2x10 bilat 25# 2x10 Bilat  25#      TB 90/90 IR and ER         OH Slam Ball 3 Way 7# NT 10x ea 10x Each   7#      [place] upright row  2x10   20# 2x10 20# 20#   2x10                      Ther Ex  3/24      UBE 10 min   60 resist  Alt  10' 60resist alt 10 min  60 resist  Alt      Wrist flex and ext         Tputty          Tputty pinch        Tputty pinch and pull         Digiflex        Power Web        2 way Bicep Curls  2x10 Each   15# DB  3 Way 2x10 ea 20# DB,15#DB 3 way   2x10 Each   20# DB      IR Towel Stretch  3x15" 5x15" Hold  Right Only      Doorway pec stretch    5x15" Hold              HEP update/review         Ther Activity  3/24                      Gait Training  3/24                      Modalities  3/24      CP to the right elbow  15" MH Declined

## 2022-03-28 ENCOUNTER — OFFICE VISIT (OUTPATIENT)
Dept: PHYSICAL THERAPY | Facility: CLINIC | Age: 47
End: 2022-03-28
Payer: COMMERCIAL

## 2022-03-28 DIAGNOSIS — G89.29 CHRONIC RIGHT SHOULDER PAIN: ICD-10-CM

## 2022-03-28 DIAGNOSIS — Z09 S/P ORTHOPEDIC SURGERY, FOLLOW-UP EXAM: ICD-10-CM

## 2022-03-28 DIAGNOSIS — S46.211A RUPTURE OF RIGHT DISTAL BICEPS TENDON, INITIAL ENCOUNTER: Primary | ICD-10-CM

## 2022-03-28 DIAGNOSIS — M25.521 RIGHT ELBOW PAIN: ICD-10-CM

## 2022-03-28 DIAGNOSIS — M25.511 CHRONIC RIGHT SHOULDER PAIN: ICD-10-CM

## 2022-03-28 PROCEDURE — 97110 THERAPEUTIC EXERCISES: CPT | Performed by: PHYSICAL THERAPIST

## 2022-03-28 PROCEDURE — 97140 MANUAL THERAPY 1/> REGIONS: CPT | Performed by: PHYSICAL THERAPIST

## 2022-03-28 PROCEDURE — 97112 NEUROMUSCULAR REEDUCATION: CPT | Performed by: PHYSICAL THERAPIST

## 2022-03-30 NOTE — PROGRESS NOTES
PT Discharge    Today's date: 3/31/2022  Patient name: Ewa Buchanan  : 1975  MRN: [id number]  Referring provider: Darin Beckwith  Dx:   Encounter Diagnosis     ICD-10-CM    1  Rupture of right distal biceps tendon, initial encounter  S46 211A    2  Right elbow pain  M25 521    3  S/P orthopedic surgery, follow-up exam  Z09    4  Chronic right shoulder pain  M25 511     [id number]                   Assessment  Assessment details: PT notes the patient with improved ROM and strength t/o the right bicep and UE s/p right distal rupture repair  PT notes the patient has met all therapy goals and is ready for a transition to HEP  Impairments: abnormal or restricted ROM, activity intolerance, impaired physical strength, lacks appropriate home exercise program, pain with function, weight-bearing intolerance and poor posture   Understanding of Dx/Px/POC: good   Prognosis: good    Goals  ST  Initiate HEP-MET  2  Decrease pain levels by 25-50%-MET   3  Increase strength by 1-2 mm grades-MET  4  Increase ROM by 25-50%-MET   LT  Improve postural awareness-MET   2  Improve scapular stability-MET   3  Decrease limitations with reaching, lifting and carrying-MET  4  Decrease limitations with gripping and push/pull-MET  5  Decrease limitations with ADL-MET  6  RTW full duty-MET  7   DC with HEP-MET      Plan  Plan details: Transition to HEP     Patient would benefit from: skilled physical therapy  Planned modality interventions: cryotherapy  Planned therapy interventions: manual therapy, neuromuscular re-education, patient education, postural training, self care, strengthening, stretching, therapeutic exercise, home exercise program, graded exercise and flexibility  Frequency: 2x week  Duration in weeks: 1  Treatment plan discussed with: patient        Subjective Evaluation    History of Present Illness  Date of surgery: 2021  Mechanism of injury: surgery  Mechanism of injury: Patient reports catching a cat falling out of a tree  When he felt immediate pull and pain in the right elbow  Patient went to ortho MD for evaluation and found to have + distal bicep tear with need for surgical repair  Patient underwent the corrective procedure on 21 and is now p/o with orders for OPPT  Patient reports difficulty with gripping, lifting, carrying, ADL, work duties, recreation, reaching, and sleep  Patient reports he is presently OOW as  with significant PMH of right RC injury  Presently the patient has attended 25 sessions of skilled therapy and feels 90-95% improvement since the start of therapy  Patient reports the pain levels have decreased in the right shoulder and elbow with increase strength and stability in the UE  Patient reports he has returned to ADL and recreation with minimal to no limitations  Patient reports he is happy with current status and is ready for a transition to [facility]     Pain  Current pain ratin  At best pain ratin  At worst pain ratin  Location: Right shoulder   Quality: dull ache, discomfort and tight  Relieving factors: rest  Aggravating factors: overhead activity and lifting  Progression: improved      Diagnostic Tests  MRI studies: abnormal  Treatments  Current treatment: immobilization and physical therapy  Patient Goals  Patient goals for therapy: decreased pain, increased motion, increased strength, independence with ADLs/IADLs, return to sport/leisure activities and return to work          Objective     Postural Observations  Seated posture: fair  Standing posture: fair        Active Range of Motion     Right Shoulder   Flexion: WFL  Extension: WFL  Abduction: [facility]  External rotation 90°: 85 degrees  Internal rotation 90°: 62 degrees     Right Elbow   Normal active range of motion  Flexion: WFL  Extension: WFL  Forearm supination: WFL  Forearm pronation: WFL    Right Wrist   Wrist flexion: WFL  Wrist extension: WFl  Radial deviation: WFL  Ulnar deviation: WFL    Passive Range of Motion     Right Shoulder   Flexion: WFL  Abduction: WFL  External rotation 90°: 87 degrees   Internal rotation 90°: 62 degrees     Right Elbow   Flexion: WFL  Extension: WFL    Right Wrist   Wrist flexion: WFL  Wrist extension: WFL  Radial deviation: WFL  Ulnar deviation: WFL     Strength/Myotome Testing     Right Shoulder     Planes of Motion   Flexion: 4   Extension: 5   Abduction: 4+   Adduction: 5   External rotation at 90°: 4+   Internal rotation at 90°: 5     Right Elbow   Flexion: 4+  Extension: 5  Forearm supination: 4+  Forearm pronation: 5    Left Wrist/Hand   Normal wrist strength     (2nd hand position)     Trial 1: 110    Thumb Strength  Key/Lateral Pinch     Trial 1: 11    Right Wrist/Hand   Wrist extension: 4+  Wrist flexion: 4+  Radial deviation: 5  Ulnar deviation: 5     (2nd hand position)     Trial 1: 88    Thumb Strength   Key/Lateral Pinch     Trial 1: 10             Precautions:  MD protocol       Manuals 3/21 3/24 3/28 3/31    Right scapular mobs and GH stretching  10 min 10' 10 min                              Neuro Re-Ed  3/24      S/L ER and ABD         [place] Pull 2x10 40# 2x10 40# 2x10 40#      [place] Push  2x10 40# 2x10 40# 2x10 40#      [place]  Press 2x10   35#  2x10 35# 2x10   35#      TB OH Press and Hold  NT 10x bilat direction BLACK 10x Bilat  Direction  Black      Ball into wall Hold  3x 30 perturbations  Yellow Ball 3x30 perturbations Yellow Ball DC     Scapular wall slides IYTA         Wall Push ups         Ball Into Wall         TB MTP and LTP        [place] Lat Pulldown 2x10   45#  2x10 45# 50#   2x10      [place] Chop down 2x10 Bilat  25#  2x10 bilat 25# 2x10 Bilat  25#      [place] Chop Up  2x10 Bilat  25#  2x10 bilat 25# 2x10 Bilat  25#      TB 90/90 IR and ER         OH Slam Ball 3 Way 7# NT 10x ea 10x Each   7#      [place] upright row  2x10   20# 2x10 20# 20#   2x10                      Ther Ex  3/24      UBE 10 min   60 resist  Alt  10' 60resist alt 10 min  60 resist  Alt  10 min   Level  1 5 Alt     Wrist flex and ext         Tputty          Tputty pinch        Tputty pinch and pull         Digiflex        Power Web        2 way Bicep Curls  2x10 Each   15# DB  3 Way 2x10 ea 20# DB,15#DB 3 way   2x10 Each   20# DB      IR Towel Stretch  3x15" 5x15" Hold  Right Only      Doorway pec stretch    5x15" Hold              HEP update/review     30 min     Ther Activity  3/24                      Gait Training  3/24                      Modalities  3/24      CP to the right elbow  15"  Declined

## 2022-03-31 ENCOUNTER — OFFICE VISIT (OUTPATIENT)
Dept: PHYSICAL THERAPY | Facility: CLINIC | Age: 47
End: 2022-03-31
Payer: COMMERCIAL

## 2022-03-31 DIAGNOSIS — G89.29 CHRONIC RIGHT SHOULDER PAIN: ICD-10-CM

## 2022-03-31 DIAGNOSIS — M25.511 CHRONIC RIGHT SHOULDER PAIN: ICD-10-CM

## 2022-03-31 DIAGNOSIS — S46.211A RUPTURE OF RIGHT DISTAL BICEPS TENDON, INITIAL ENCOUNTER: Primary | ICD-10-CM

## 2022-03-31 DIAGNOSIS — M25.521 RIGHT ELBOW PAIN: ICD-10-CM

## 2022-03-31 DIAGNOSIS — Z09 S/P ORTHOPEDIC SURGERY, FOLLOW-UP EXAM: ICD-10-CM

## 2022-03-31 PROCEDURE — 97535 SELF CARE MNGMENT TRAINING: CPT | Performed by: PHYSICAL THERAPIST

## 2022-03-31 PROCEDURE — 97110 THERAPEUTIC EXERCISES: CPT | Performed by: PHYSICAL THERAPIST

## 2022-06-10 ENCOUNTER — OFFICE VISIT (OUTPATIENT)
Dept: FAMILY MEDICINE CLINIC | Facility: CLINIC | Age: 47
End: 2022-06-10
Payer: COMMERCIAL

## 2022-06-10 VITALS
DIASTOLIC BLOOD PRESSURE: 82 MMHG | HEIGHT: 71 IN | TEMPERATURE: 97.2 F | HEART RATE: 67 BPM | OXYGEN SATURATION: 98 % | SYSTOLIC BLOOD PRESSURE: 134 MMHG | BODY MASS INDEX: 26.99 KG/M2 | WEIGHT: 192.8 LBS

## 2022-06-10 DIAGNOSIS — Z00.00 ANNUAL PHYSICAL EXAM: Primary | ICD-10-CM

## 2022-06-10 DIAGNOSIS — Z13.6 SCREENING FOR CARDIOVASCULAR CONDITION: ICD-10-CM

## 2022-06-10 PROBLEM — G89.29 CHRONIC RIGHT SHOULDER PAIN: Status: RESOLVED | Noted: 2021-04-06 | Resolved: 2022-06-10

## 2022-06-10 PROBLEM — F41.1 GENERALIZED ANXIETY DISORDER: Status: RESOLVED | Noted: 2018-06-15 | Resolved: 2022-06-10

## 2022-06-10 PROBLEM — S63.621A SPRAIN OF INTERPHALANGEAL JOINT OF RIGHT THUMB: Status: RESOLVED | Noted: 2018-11-17 | Resolved: 2022-06-10

## 2022-06-10 PROBLEM — M25.511 CHRONIC RIGHT SHOULDER PAIN: Status: RESOLVED | Noted: 2021-04-06 | Resolved: 2022-06-10

## 2022-06-10 PROBLEM — M79.644 PAIN OF RIGHT THUMB: Status: RESOLVED | Noted: 2018-11-17 | Resolved: 2022-06-10

## 2022-06-10 PROBLEM — S46.211A RUPTURE OF RIGHT DISTAL BICEPS TENDON: Status: RESOLVED | Noted: 2021-12-16 | Resolved: 2022-06-10

## 2022-06-10 PROCEDURE — 99396 PREV VISIT EST AGE 40-64: CPT | Performed by: FAMILY MEDICINE

## 2022-06-10 PROCEDURE — 1036F TOBACCO NON-USER: CPT | Performed by: FAMILY MEDICINE

## 2022-06-10 PROCEDURE — 3725F SCREEN DEPRESSION PERFORMED: CPT | Performed by: FAMILY MEDICINE

## 2022-06-10 PROCEDURE — 3008F BODY MASS INDEX DOCD: CPT | Performed by: FAMILY MEDICINE

## 2022-06-10 NOTE — PATIENT INSTRUCTIONS
Wellness Visit for Adults   AMBULATORY CARE:   A wellness visit  is when you see your healthcare provider to get screened for health problems  Your healthcare provider will also give you advice on how to stay healthy  Write down your questions so you remember to ask them  Ask your healthcare provider how often you should have a wellness visit  What happens at a wellness visit:  Your healthcare provider will ask about your health, and your family history of health problems  This includes high blood pressure, heart disease, and cancer  He or she will ask if you have symptoms that concern you, if you smoke, and about your mood  You may also be asked about your intake of medicines, supplements, food, and alcohol  Any of the following may be done:  · Your weight  will be checked  Your height may also be checked so your body mass index (BMI) can be calculated  Your BMI shows if you are at a healthy weight  · Your blood pressure  and heart rate will be checked  Your temperature may also be checked  · Blood and urine tests  may be done  Blood tests may be done to check your cholesterol levels  Abnormal cholesterol levels increase your risk for heart disease and stroke  You may also need a blood or urine test to check for diabetes if you are at increased risk  Urine tests may be done to look for signs of an infection or kidney disease  · A physical exam  includes checking your heartbeat and lungs with a stethoscope  Your healthcare provider may also check your skin to look for sun damage  · Screening tests  may be recommended  A screening test is done to check for diseases that may not cause symptoms  The screening tests you may need depend on your age, gender, family history, and lifestyle habits  For example, colorectal screening may be recommended if you are 48years old or older  Screening tests you need if you are a woman:   · A Pap smear  is used to screen for cervical cancer   Pap smears are usually done every 3 to 5 years depending on your age  You may need them more often if you have had abnormal Pap smear test results in the past  Ask your healthcare provider how often you should have a Pap smear  · A mammogram  is an x-ray of your breasts to screen for breast cancer  Experts recommend mammograms every 2 years starting at age 48 years  You may need a mammogram at age 52 years or younger if you have an increased risk for breast cancer  Talk to your healthcare provider about when you should start having mammograms and how often you need them  Vaccines you may need:   · Get an influenza vaccine  every year  The influenza vaccine protects you from the flu  Several types of viruses cause the flu  The viruses change over time, so new vaccines are made each year  · Get a tetanus-diphtheria (Td) booster vaccine  every 10 years  This vaccine protects you against tetanus and diphtheria  Tetanus is a severe infection that may cause painful muscle spasms and lockjaw  Diphtheria is a severe bacterial infection that causes a thick covering in the back of your mouth and throat  · Get a human papillomavirus (HPV) vaccine  if you are female and aged 23 to 32 or male 23 to 24 and never received it  This vaccine protects you from HPV infection  HPV is the most common infection spread by sexual contact  HPV may also cause vaginal, penile, and anal cancers  · Get a pneumococcal vaccine  if you are aged 72 years or older  The pneumococcal vaccine is an injection given to protect you from pneumococcal disease  Pneumococcal disease is an infection caused by pneumococcal bacteria  The infection may cause pneumonia, meningitis, or an ear infection  · Get a shingles vaccine  if you are 60 or older, even if you have had shingles before  The shingles vaccine is an injection to protect you from the varicella-zoster virus  This is the same virus that causes chickenpox   Shingles is a painful rash that develops in people who had chickenpox or have been exposed to the virus  How to eat healthy:  My Plate is a model for planning healthy meals  It shows the types and amounts of foods that should go on your plate  Fruits and vegetables make up about half of your plate, and grains and protein make up the other half  A serving of dairy is included on the side of your plate  The amount of calories and serving sizes you need depends on your age, gender, weight, and height  Examples of healthy foods are listed below:  · Eat a variety of vegetables  such as dark green, red, and orange vegetables  You can also include canned vegetables low in sodium (salt) and frozen vegetables without added butter or sauces  · Eat a variety of fresh fruits , canned fruit in 100% juice, frozen fruit, and dried fruit  · Include whole grains  At least half of the grains you eat should be whole grains  Examples include whole-wheat bread, wheat pasta, brown rice, and whole-grain cereals such as oatmeal     · Eat a variety of protein foods such as seafood (fish and shellfish), lean meat, and poultry without skin (turkey and chicken)  Examples of lean meats include pork leg, shoulder, or tenderloin, and beef round, sirloin, tenderloin, and extra lean ground beef  Other protein foods include eggs and egg substitutes, beans, peas, soy products, nuts, and seeds  · Choose low-fat dairy products such as skim or 1% milk or low-fat yogurt, cheese, and cottage cheese  · Limit unhealthy fats  such as butter, hard margarine, and shortening  Exercise:  Exercise at least 30 minutes per day on most days of the week  Some examples of exercise include walking, biking, dancing, and swimming  You can also fit in more physical activity by taking the stairs instead of the elevator or parking farther away from stores  Include muscle strengthening activities 2 days each week  Regular exercise provides many health benefits   It helps you manage your weight, and decreases your risk for type 2 diabetes, heart disease, stroke, and high blood pressure  Exercise can also help improve your mood  Ask your healthcare provider about the best exercise plan for you  General health and safety guidelines:   · Do not smoke  Nicotine and other chemicals in cigarettes and cigars can cause lung damage  Ask your healthcare provider for information if you currently smoke and need help to quit  E-cigarettes or smokeless tobacco still contain nicotine  Talk to your healthcare provider before you use these products  · Limit alcohol  A drink of alcohol is 12 ounces of beer, 5 ounces of wine, or 1½ ounces of liquor  · Lose weight, if needed  Being overweight increases your risk of certain health conditions  These include heart disease, high blood pressure, type 2 diabetes, and certain types of cancer  · Protect your skin  Do not sunbathe or use tanning beds  Use sunscreen with a SPF 15 or higher  Apply sunscreen at least 15 minutes before you go outside  Reapply sunscreen every 2 hours  Wear protective clothing, hats, and sunglasses when you are outside  · Drive safely  Always wear your seatbelt  Make sure everyone in your car wears a seatbelt  A seatbelt can save your life if you are in an accident  Do not use your cell phone when you are driving  This could distract you and cause an accident  Pull over if you need to make a call or send a text message  · Practice safe sex  Use latex condoms if are sexually active and have more than one partner  Your healthcare provider may recommend screening tests for sexually transmitted infections (STIs)  · Wear helmets, lifejackets, and protective gear  Always wear a helmet when you ride a bike or motorcycle, go skiing, or play sports that could cause a head injury  Wear protective equipment when you play sports  Wear a lifejacket when you are on a boat or doing water sports      © Copyright [employer] 2022 Information is for End User's use only and may not be sold, redistributed or otherwise used for commercial purposes  All illustrations and images included in CareNotes® are the copyrighted property of A D A M [employer]  or Jerardo Santiago  The above information is an  only  It is not intended as medical advice for individual conditions or treatments  Talk to your doctor, nurse or pharmacist before following any medical regimen to see if it is safe and effective for you  Heart Healthy Diet   AMBULATORY CARE:   A heart healthy diet  is an eating plan low in unhealthy fats and sodium (salt)  The plan is high in healthy fats and fiber  A heart healthy diet helps improve your cholesterol levels and lowers your risk for heart disease and stroke  A dietitian will teach you how to read and understand food labels  Heart healthy diet guidelines to follow:   · Choose foods that contain healthy fats  ? Unsaturated fats  include monounsaturated and polyunsaturated fats  Unsaturated fat is found in foods such as soybean, canola, olive, corn, and safflower oils  It is also found in soft tub margarine that is made with liquid vegetable oil  ? Omega-3 fat  is found in certain fish, such as salmon, tuna, and trout, and in walnuts and flaxseed  Eat fish high in omega-3 fats at least 2 times a week  · Get 20 to 30 grams of fiber each day  Fruits, vegetables, whole-grain foods, and legumes (cooked beans) are good sources of fiber  · Limit or do not have unhealthy fats  ? Cholesterol  is found in animal foods, such as eggs and lobster, and in dairy products made from whole milk  Limit cholesterol to less than 200 mg each day  ? Saturated fat  is found in meats, such as mcintyre and hamburger  It is also found in chicken or turkey skin, whole milk, and butter  Limit saturated fat to less than 7% of your total daily calories  ? Trans fat  is found in packaged foods, such as potato chips and cookies   It is also in hard margarine, some fried foods, and shortening  Do not eat foods that contain trans fats  · Limit sodium as directed  You may be told to limit sodium to 2,000 to 2,300 mg each day  Choose low-sodium or no-salt-added foods  Add little or no salt to food you prepare  Use herbs and spices in place of salt  Include the following in your heart healthy plan:  Ask your dietitian or healthcare provider how many servings to have from each of the following food groups:  · Grains:      ? Whole-wheat breads, cereals, and pastas, and brown rice    ? Low-fat, low-sodium crackers and chips    · Vegetables:      ? Broccoli, green beans, green peas, and spinach    ? Collards, kale, and lima beans    ? Carrots, sweet potatoes, tomatoes, and peppers    ? Canned vegetables with no salt added    · Fruits:      ? Bananas, peaches, pears, and pineapple    ? Grapes, raisins, and dates    ? Oranges, tangerines, grapefruit, orange juice, and grapefruit juice    ? Apricots, mangoes, melons, and papaya    ? Raspberries and strawberries    ? Canned fruit with no added sugar    · Low-fat dairy:      ? Nonfat (skim) milk, 1% milk, and low-fat almond, cashew, or soy milks fortified with calcium    ? Low-fat cheese, regular or frozen yogurt, and cottage cheese    · Meats and proteins:      ? Lean cuts of beef and pork (loin, leg, round), skinless chicken and turkey    ? Legumes, soy products, egg whites, or nuts    Limit or do not include the following in your heart healthy plan:   · Unhealthy fats and oils:      ? Whole or 2% milk, cream cheese, sour cream, or cheese    ? High-fat cuts of beef (T-bone steaks, ribs), chicken or turkey with skin, and organ meats such as liver    ?  Butter, stick margarine, shortening, and cooking oils such as coconut or palm oil    · Foods and liquids high in sodium:      ? Packaged foods, such as frozen dinners, cookies, macaroni and cheese, and cereals with more than 300 mg of sodium per serving    ? Vegetables with added sodium, such as instant potatoes, vegetables with added sauces, or regular canned vegetables    ? Cured or smoked meats, such as hot dogs, mcintyre, and sausage    ? High-sodium ketchup, barbecue sauce, salad dressing, pickles, olives, soy sauce, or miso    · Foods and liquids high in sugar:      ? Candy, cake, cookies, pies, or doughnuts    ? Soft drinks (soda), sports drinks, or sweetened tea    ? Canned or dry mixes for cakes, soups, sauces, or gravies    Other healthy heart guidelines:   · Do not smoke  Nicotine and other chemicals in cigarettes and cigars can cause lung and heart damage  Ask your healthcare provider for information if you currently smoke and need help to quit  E-cigarettes or smokeless tobacco still contain nicotine  Talk to your healthcare provider before you use these products  · Limit or do not drink alcohol as directed  Alcohol can damage your heart and raise your blood pressure  Your healthcare provider may give you specific daily and weekly limits  The general recommended limit is 1 drink a day for women 21 or older and for men 72 or older  Do not have more than 3 drinks in a day or 7 in a week  The recommended limit is 2 drinks a day for men 24to 59years of age  Do not have more than 4 drinks in a day or 14 in a week  A drink of alcohol is 12 ounces of beer, 5 ounces of wine, or 1½ ounces of liquor  · Exercise regularly  Exercise can help you maintain a healthy weight and improve your blood pressure and cholesterol levels  Regular exercise can also decrease your risk for heart problems  Ask your healthcare provider about the best exercise plan for you  Do not start an exercise program without asking your healthcare provider  Follow up with your doctor or cardiologist as directed:  Write down your questions so you remember to ask them during your visits    © Copyright [employer] 2022 Information is for End User's use only and may not be sold, redistributed or otherwise used for commercial purposes  All illustrations and images included in CareNotes® are the copyrighted property of [employer]  or Jerardo Santiago  The above information is an  only  It is not intended as medical advice for individual conditions or treatments  Talk to your doctor, nurse or pharmacist before following any medical regimen to see if it is safe and effective for you

## 2022-06-10 NOTE — PROGRESS NOTES
ADULT ANNUAL [address] PRIMARY CARE    NAME: Shy Dobbs  AGE: 42 y o  SEX: male  : 1975     DATE: 6/10/2022     Assessment and Plan:   Patient will continue to diet and exercise  Blood work ordered  Follow-up yearly and p r n     Problem List Items Addressed This Visit    None     Visit Diagnoses     Annual physical exam    -  Primary    BMI 26 0-26 9,adult        Relevant Orders    CBC and differential    Screening for cardiovascular condition        Relevant Orders    CBC and differential    Comprehensive metabolic panel    Lipid Panel with Direct LDL reflex          Immunizations and preventive care screenings were discussed with patient today  Appropriate education was printed on patient's after visit summary  Counseling:  Dental Health: discussed importance of regular tooth brushing, flossing, and dental visits  · Exercise: the importance of regular exercise/physical activity was discussed  Recommend exercise 3-5 times per week for at least 30 minutes  BMI Counseling: Body mass index is 26 89 kg/m²  The BMI is above normal  Nutrition recommendations include decreasing portion sizes, encouraging healthy choices of fruits and vegetables, decreasing fast food intake, consuming healthier snacks, limiting drinks that contain sugar, moderation in carbohydrate intake, increasing intake of lean protein, reducing intake of saturated and trans fat and reducing intake of cholesterol  Exercise recommendations include exercising 3-5 times per week  No pharmacotherapy was ordered  Rationale for BMI follow-up plan is due to patient being overweight or obese  Depression Screening and Follow-up Plan: Patient was screened for depression during today's encounter  They screened negative with a PHQ-2 score of 0  Return in 1 year (on 6/10/2023)       Chief Complaint:     Chief Complaint   Patient presents with    Well Check      History of Present Illness: Adult Annual Physical   Patient here for a comprehensive physical exam  The patient reports no problems  Diet and Physical Activity  · Diet/Nutrition: well balanced diet  · Exercise: 5-7 times a week on average  Depression Screening  PHQ-2/9 Depression Screening    Little interest or pleasure in doing things: 0 - not at all  Feeling down, depressed, or hopeless: 0 - not at all  Trouble falling or staying asleep, or sleeping too much: 0 - not at all  Feeling tired or having little energy: 0 - not at all  Poor appetite or overeatin - not at all  Feeling bad about yourself - or that you are a failure or have let yourself or your family down: 0 - not at all  Trouble concentrating on things, such as reading the newspaper or watching television: 0 - not at all  Moving or speaking so slowly that other people could have noticed  Or the opposite - being so fidgety or restless that you have been moving around a lot more than usual: 0 - not at all  Thoughts that you would be better off dead, or of hurting yourself in some way: 0 - not at all  PHQ-2 Score: 0  PHQ-2 Interpretation: Negative depression screen  PHQ-9 Score: 0   PHQ-9 Interpretation: No or Minimal depression        General Health  · Sleep: sleeps well  · Hearing: normal - bilateral   · Vision: no vision problems  · Dental: regular dental visits   Health  · Symptoms include: none     Review of Systems:     Review of Systems   Constitutional: Negative  Respiratory: Negative  Cardiovascular: Negative  Gastrointestinal: Negative  Genitourinary: Negative         Past Medical History:     Past Medical History:   Diagnosis Date    Known health problems: none     Rupture of right distal biceps tendon 2021      Past Surgical History:     Past Surgical History:   Procedure Laterality Date    ESOPHAGOGASTRODUODENOSCOPY      LASIK      OR REINSERT BI/TRICEPS TENDON,DISTAL Right 2021    Procedure: DISTAL BICEPS TENDON REPAIR;  Surgeon: Florencia Horta MD;  Location: [facility];  Service: Orthopedics      Family History:     Family History   Problem Relation Age of Onset    No Known Problems Mother     No Known Problems Father       Social History:     Social History     Socioeconomic History    Marital status: /Civil Union     Spouse name: None    Number of children: None    Years of education: None    Highest education level: None   Occupational History    None   Tobacco Use    Smoking status: Never Smoker    Smokeless tobacco: Never Used   Vaping Use    Vaping Use: Never used   Substance and Sexual Activity    Alcohol use: Yes     Comment: 4-5x weekly, one drink each time    Drug use: No    Sexual activity: None     Comment: defer   Other Topics Concern    None   Social History Narrative        Cat and dog    Always uses seat belt    Caffeine use    Living will    Anglican     Social Determinants of Health     Financial Resource Strain: Not on file   Food Insecurity: Not on file   Transportation Needs: Not on file   Physical Activity: Not on file   Stress: Not on file   Social Connections: Not on file   Intimate Partner Violence: Not on file   Housing Stability: Not on file      Current Medications:     No current outpatient medications on file  No current facility-administered medications for this visit  Allergies:     No Known Allergies   Physical Exam:     /82   Pulse 67   Temp (!) 97 2 °F (36 2 °C)   Ht 5' 11" (1 803 m)   Wt 87 5 kg (192 lb 12 8 oz)   SpO2 98%   BMI 26 89 kg/m²     Physical Exam  Vitals reviewed  Constitutional:       General: He is not in acute distress  Appearance: Normal appearance  He is well-developed  He is not diaphoretic  HENT:      Head: Normocephalic and atraumatic        Right Ear: Tympanic membrane, ear canal and external ear normal       Left Ear: Tympanic membrane, ear canal and external ear normal    Eyes: Conjunctiva/sclera: Conjunctivae normal    Cardiovascular:      Rate and Rhythm: Normal rate and regular rhythm  Heart sounds: Normal heart sounds  No murmur heard  No friction rub  No gallop  Pulmonary:      Effort: Pulmonary effort is normal  No respiratory distress  Breath sounds: Normal breath sounds  No wheezing, rhonchi or rales  Musculoskeletal:      Right lower leg: No edema  Left lower leg: No edema  Neurological:      General: No focal deficit present  Mental Status: He is alert and oriented to person, place, and time  Psychiatric:         Mood and Affect: Mood normal          Behavior: Behavior normal          Thought Content: Thought content normal          Judgment: Judgment normal           [facility] [place]  BMI Counseling: Body mass index is 26 89 kg/m²  The BMI is above normal  Nutrition recommendations include reducing portion sizes, decreasing overall calorie intake, 3-5 servings of fruits/vegetables daily, reducing fast food intake, consuming healthier snacks, decreasing soda and/or juice intake, moderation in carbohydrate intake, increasing intake of lean protein, reducing intake of saturated fat and trans fat and reducing intake of cholesterol  Exercise recommendations include exercising 3-5 times per week

## 2022-07-28 ENCOUNTER — OFFICE VISIT (OUTPATIENT)
Dept: URGENT CARE | Facility: CLINIC | Age: 47
End: 2022-07-28
Payer: COMMERCIAL

## 2022-07-28 VITALS
DIASTOLIC BLOOD PRESSURE: 73 MMHG | BODY MASS INDEX: 25.9 KG/M2 | HEIGHT: 71 IN | HEART RATE: 68 BPM | OXYGEN SATURATION: 99 % | RESPIRATION RATE: 14 BRPM | TEMPERATURE: 98.2 F | WEIGHT: 185 LBS | SYSTOLIC BLOOD PRESSURE: 128 MMHG

## 2022-07-28 DIAGNOSIS — S91.112A LACERATION OF LEFT GREAT TOE WITHOUT FOREIGN BODY PRESENT OR DAMAGE TO NAIL, INITIAL ENCOUNTER: Primary | ICD-10-CM

## 2022-07-28 PROCEDURE — 90715 TDAP VACCINE 7 YRS/> IM: CPT

## 2022-07-28 PROCEDURE — 99213 OFFICE O/P EST LOW 20 MIN: CPT | Performed by: PHYSICIAN ASSISTANT

## 2022-07-28 RX ORDER — CEPHALEXIN 500 MG/1
500 CAPSULE ORAL EVERY 12 HOURS SCHEDULED
Qty: 14 CAPSULE | Refills: 0 | Status: SHIPPED | OUTPATIENT
Start: 2022-07-28 | End: 2022-08-04

## 2022-07-28 NOTE — PATIENT INSTRUCTIONS
Take antibiotic as prescribed  Complete full dose of antibiotics even if symptoms begin to improve or resolve  Tylenol ibuprofen for pain  Clean with soap and water  Do not use peroxide  Observe for signs of worsening infection including increased swelling, redness, pain, discharge, fever or chills, or persistent symptoms  Your symptoms should begin to improve over the next couple days  Follow-up with your PCP in 3-5 days if symptoms worsen or do not improve  Go to ER if symptoms become severe

## 2022-07-28 NOTE — PROGRESS NOTES
[facility]        NAME: Milana Garcia is a 42 y o  male  : 1975    MRN: [id number]  DATE: 2022  TIME: 7:51 PM    Assessment and Plan   Laceration of left great toe without foreign body present or damage to nail, initial encounter [S91 112A]  1  Laceration of left great toe without foreign body present or damage to nail, initial encounter  cephalexin (KEFLEX) 500 mg capsule    Tdap Vaccine greater than or equal to 8yo     Wound cleansed by RN and  Antibiotic ointment applied  Clean Band-Aid  Discussed with patient need for x-ray to determine if open fracture  Discussed with him if it is a fracture, he is high risk for bone infection  Patient denied x-ray at this time  Patient Instructions   Take antibiotic as prescribed  Complete full dose of antibiotics even if symptoms begin to improve or resolve  Tylenol ibuprofen for pain  Clean with soap and water  Do not use peroxide  Observe for signs of worsening infection including increased swelling, redness, pain, discharge, fever or chills, or persistent symptoms  Follow up with PCP in 3-5 days  Proceed to  ER if symptoms worsen  Chief Complaint     Chief Complaint   Patient presents with    Wound     C/o wound left great toe, injured 2 days ago when going down a stair and caught on the concrete sidewalk    Small flap laceration noted distal toe  History of Present Illness       Patient is a 46-year-old male with no significant past medical history presents the office complaining of injury to left great toe 2 days ago  States he was going down the stairs and got his toe caught on the concrete causing a laceration  Pain is rated 7/10  Patient was unsure if he needed stitches  Denies fevers, chills, or purulent drainage  Clean the wound with peroxide  Per chart review, last tetanus 2013  Review of Systems   Review of Systems   Constitutional: Negative for chills and fever  Skin: Positive for wound  Neurological: Negative for numbness  Current Medications       Current Outpatient Medications:     cephalexin (KEFLEX) 500 mg capsule, Take 1 capsule (500 mg total) by mouth every 12 (twelve) hours for 7 days, Disp: 14 capsule, Rfl: 0    Current Allergies     Allergies as of 07/28/2022    (No Known Allergies)            The following portions of the patient's history were reviewed and updated as appropriate: allergies, current medications, past family history, past medical history, past social history, past surgical history and problem list      Past Medical History:   Diagnosis Date    Known health problems: none     Rupture of right distal biceps tendon 12/16/2021       Past Surgical History:   Procedure Laterality Date    ESOPHAGOGASTRODUODENOSCOPY      LASIK      DC REINSERT BI/TRICEPS TENDON,DISTAL Right 12/21/2021    Procedure: DISTAL BICEPS TENDON REPAIR;  Surgeon: Kamran Bain MD;  Location: [facility];  Service: Orthopedics       Family History   Problem Relation Age of Onset    No Known Problems Mother     No Known Problems Father          Medications have been verified  Objective   /73   Pulse 68   Temp 98 2 °F (36 8 °C)   Resp 14   Ht 5' 11" (1 803 m)   Wt 83 9 kg (185 lb)   SpO2 99%   BMI 25 80 kg/m²   No LMP for male patient  Physical Exam     Physical Exam  Vitals and nursing note reviewed  Constitutional:       Appearance: He is well-developed  HENT:      Head: Normocephalic and atraumatic  Right Ear: External ear normal       Left Ear: External ear normal       Nose: Nose normal    Eyes:      General: Lids are normal       Conjunctiva/sclera: Conjunctivae normal    Cardiovascular:      Rate and Rhythm: Normal rate and regular rhythm  Pulmonary:      Effort: Pulmonary effort is normal       Breath sounds: Normal breath sounds  Skin:     General: Skin is warm and dry        Capillary Refill: Capillary refill takes less than 2 seconds  Findings: Wound (1 centimeter superficial laceration to the distal aspect of left great toe  No damage to nail  See image ) present  Comments: TTP over toe wound and distal phalanx   Neurological:      Mental Status: He is alert                 Left great toe

## 2022-08-02 ENCOUNTER — TELEPHONE (OUTPATIENT)
Dept: FAMILY MEDICINE CLINIC | Facility: CLINIC | Age: 47
End: 2022-08-02

## 2022-08-02 NOTE — TELEPHONE ENCOUNTER
Left vm for pt to contact [phone] opt if he needs an earlier appt than 8/10 which he made through [facility]    is he having problems? ?

## 2022-08-03 ENCOUNTER — OFFICE VISIT (OUTPATIENT)
Dept: FAMILY MEDICINE CLINIC | Facility: CLINIC | Age: 47
End: 2022-08-03
Payer: COMMERCIAL

## 2022-08-03 VITALS
TEMPERATURE: 98.5 F | HEIGHT: 71 IN | WEIGHT: 190.8 LBS | DIASTOLIC BLOOD PRESSURE: 78 MMHG | HEART RATE: 73 BPM | BODY MASS INDEX: 26.71 KG/M2 | OXYGEN SATURATION: 99 % | SYSTOLIC BLOOD PRESSURE: 110 MMHG

## 2022-08-03 DIAGNOSIS — S91.112D LACERATION OF LEFT GREAT TOE WITHOUT FOREIGN BODY PRESENT OR DAMAGE TO NAIL, SUBSEQUENT ENCOUNTER: Primary | ICD-10-CM

## 2022-08-03 PROCEDURE — 3725F SCREEN DEPRESSION PERFORMED: CPT | Performed by: PHYSICIAN ASSISTANT

## 2022-08-03 PROCEDURE — 99213 OFFICE O/P EST LOW 20 MIN: CPT | Performed by: PHYSICIAN ASSISTANT

## 2022-08-03 NOTE — PROGRESS NOTES
Assessment/Plan:    Problem List Items Addressed This Visit    None     Visit Diagnoses     Laceration of left great toe without foreign body present or damage to nail, subsequent encounter    -  Primary           Diagnoses and all orders for this visit:    Laceration of left great toe without foreign body present or damage to nail, subsequent encounter      Wound is healing well  Provided reassurance  Recommended that he stop using antibiotic ointment  May cover with Band-Aid when in shoe  He will notify us of any new or worsening symptoms  Subjective:      Patient ID: Rosaura Doll is a 42 y o  male  Anton Madrigal is a pleasant 42year old male who is here today for a follow-up from the urgent care for a wound of his left great toe  He scraped it on a cement step on Tuesday of last week  He was seen at Urgent Care on Thursday  They placed him on keflex, updated his tetanus shot, and provided reassurance  He has been keeping it covered when in a shoe with a bandaid  He has been applying topical antibiotic ointment  He denies any redness or drainage  He has been keeping it clean  The following portions of the patient's history were reviewed and updated as appropriate:   He has a past medical history of Known health problems: none and Rupture of right distal biceps tendon (12/16/2021)  ,  does not have any pertinent problems on file  ,   has a past surgical history that includes Esophagogastroduodenoscopy; LASIK; and pr reinsert bi/triceps tendon,distal (Right, 12/21/2021)  ,  family history includes No Known Problems in his father and mother  ,   reports that he has never smoked  He has never used smokeless tobacco  He reports current alcohol use  He reports that he does not use drugs  ,  has No Known Allergies     Current Outpatient Medications   Medication Sig Dispense Refill    cephalexin (KEFLEX) 500 mg capsule Take 1 capsule (500 mg total) by mouth every 12 (twelve) hours for 7 days 14 capsule 0     No current facility-administered medications for this visit  Review of Systems   Constitutional: Negative for chills, diaphoresis, fatigue and fever  HENT: Negative for congestion, ear pain, postnasal drip, rhinorrhea, sneezing, sore throat and trouble swallowing  Eyes: Negative for pain and visual disturbance  Respiratory: Negative for apnea, cough, shortness of breath and wheezing  Cardiovascular: Negative for chest pain and palpitations  Gastrointestinal: Negative for abdominal pain, constipation, diarrhea, nausea and vomiting  Genitourinary: Negative for dysuria  Musculoskeletal: Negative for arthralgias, gait problem and myalgias  Skin: Positive for wound (left great toe)  Neurological: Negative for dizziness, syncope, weakness, light-headedness, numbness and headaches  Psychiatric/Behavioral: Negative for suicidal ideas  The patient is not nervous/anxious  Objective:  Vitals:    08/03/22 0715   BP: 110/78   Pulse: 73   Temp: 98 5 °F (36 9 °C)   SpO2: 99%   Weight: 86 5 kg (190 lb 12 8 oz)   Height: 5' 11" (1 803 m)     Body mass index is 26 61 kg/m²  Physical Exam  Vitals and nursing note reviewed  Constitutional:       Appearance: He is well-developed  HENT:      Head: Normocephalic and atraumatic  Right Ear: Tympanic membrane, ear canal and external ear normal       Left Ear: Tympanic membrane, ear canal and external ear normal       Nose: Nose normal       Mouth/Throat:      Pharynx: No oropharyngeal exudate or posterior oropharyngeal erythema  Eyes:      Extraocular Movements: Extraocular movements intact  Cardiovascular:      Rate and Rhythm: Normal rate and regular rhythm  Heart sounds: Normal heart sounds  No murmur heard  No friction rub  No gallop  Pulmonary:      Effort: Pulmonary effort is normal  No respiratory distress  Breath sounds: Normal breath sounds  No wheezing or rales  Musculoskeletal:         General: Normal range of motion  Cervical back: Normal range of motion and neck supple  Feet:    Feet:      Left foot:      Skin integrity: No erythema or warmth  Lymphadenopathy:      Cervical: No cervical adenopathy  Skin:     General: Skin is warm and dry  Neurological:      Mental Status: He is alert and oriented to person, place, and time  Psychiatric:         Behavior: Behavior normal          Thought Content:  Thought content normal          Judgment: Judgment normal

## 2022-10-20 ENCOUNTER — OFFICE VISIT (OUTPATIENT)
Dept: FAMILY MEDICINE CLINIC | Facility: CLINIC | Age: 47
End: 2022-10-20
Payer: COMMERCIAL

## 2022-10-20 VITALS
OXYGEN SATURATION: 97 % | DIASTOLIC BLOOD PRESSURE: 76 MMHG | SYSTOLIC BLOOD PRESSURE: 128 MMHG | HEART RATE: 60 BPM | BODY MASS INDEX: 26.99 KG/M2 | HEIGHT: 71 IN | WEIGHT: 192.8 LBS

## 2022-10-20 DIAGNOSIS — B36.0 TINEA VERSICOLOR: Primary | ICD-10-CM

## 2022-10-20 PROCEDURE — 99213 OFFICE O/P EST LOW 20 MIN: CPT | Performed by: PHYSICIAN ASSISTANT

## 2022-10-20 RX ORDER — CLOTRIMAZOLE 1 %
CREAM (GRAM) TOPICAL 2 TIMES DAILY
Qty: 85 G | Refills: 2 | Status: SHIPPED | OUTPATIENT
Start: 2022-10-20

## 2022-10-20 NOTE — PROGRESS NOTES
Assessment/Plan:    Problem List Items Addressed This Visit    None     Visit Diagnoses     Tinea versicolor    -  Primary    Relevant Medications    clotrimazole (LOTRIMIN) 1 % cream           Diagnoses and all orders for this visit:    Tinea versicolor  -     clotrimazole (LOTRIMIN) 1 % cream; Apply topically 2 (two) times a day      Script for clotrimazole cream  He will notify us if rash does not improve or worsen     Subjective:      Patient ID: Rosaura Doll is a 42 y o  male  Anton Madrigal is a pleasant 42year old male who is here today complaining of a rash on his left thigh for the last 2-3 months  It was more prominent in the summer months  Occasionally it gets itchy, but not often  He did not try any OTC creams or medications  The following portions of the patient's history were reviewed and updated as appropriate:   He has a past medical history of Known health problems: none and Rupture of right distal biceps tendon (12/16/2021)  ,  does not have any pertinent problems on file  ,   has a past surgical history that includes Esophagogastroduodenoscopy; LASIK; and pr reinsert bi/triceps tendon,distal (Right, 12/21/2021)  ,  family history includes No Known Problems in his father and mother  ,   reports that he has never smoked  He has never used smokeless tobacco  He reports current alcohol use  He reports that he does not use drugs  ,  has No Known Allergies     Current Outpatient Medications   Medication Sig Dispense Refill   • clotrimazole (LOTRIMIN) 1 % cream Apply topically 2 (two) times a day 85 g 2     No current facility-administered medications for this visit  Review of Systems   Constitutional: Negative for chills, diaphoresis, fatigue and fever  HENT: Negative for congestion, ear pain, postnasal drip, rhinorrhea, sneezing, sore throat and trouble swallowing  Eyes: Negative for pain and visual disturbance  Respiratory: Negative for apnea, cough, shortness of breath and wheezing  Cardiovascular: Negative for chest pain and palpitations  Gastrointestinal: Negative for abdominal pain, constipation, diarrhea, nausea and vomiting  Genitourinary: Negative for dysuria and hematuria  Musculoskeletal: Negative for arthralgias, gait problem and myalgias  Skin: Positive for rash  Neurological: Negative for dizziness, syncope, weakness, light-headedness, numbness and headaches  Psychiatric/Behavioral: Negative for suicidal ideas  The patient is not nervous/anxious  Objective:  Vitals:    10/20/22 0818   BP: 128/76   Pulse: 60   SpO2: 97%   Weight: 87 5 kg (192 lb 12 8 oz)   Height: 5' 11" (1 803 m)     Body mass index is 26 89 kg/m²  Physical Exam  Vitals and nursing note reviewed  Constitutional:       Appearance: He is well-developed  HENT:      Head: Normocephalic and atraumatic  Right Ear: External ear normal       Left Ear: External ear normal       Nose: Nose normal    Eyes:      Extraocular Movements: Extraocular movements intact  Cardiovascular:      Rate and Rhythm: Normal rate and regular rhythm  Heart sounds: Normal heart sounds  No murmur heard  No friction rub  No gallop  Pulmonary:      Effort: Pulmonary effort is normal  No respiratory distress  Breath sounds: Normal breath sounds  No wheezing or rales  Musculoskeletal:         General: Normal range of motion  Cervical back: Normal range of motion and neck supple  Lymphadenopathy:      Cervical: No cervical adenopathy  Skin:     General: Skin is warm and dry  Findings: Rash (hypopigmented spots on right upper thigh consistent with tinea versicolor) present  Neurological:      Mental Status: He is alert and oriented to person, place, and time  Psychiatric:         Behavior: Behavior normal          Thought Content:  Thought content normal          Judgment: Judgment normal

## 2023-06-14 ENCOUNTER — APPOINTMENT (OUTPATIENT)
Dept: LAB | Facility: MEDICAL CENTER | Age: 48
End: 2023-06-14
Payer: COMMERCIAL

## 2023-06-14 ENCOUNTER — OFFICE VISIT (OUTPATIENT)
Dept: FAMILY MEDICINE CLINIC | Facility: CLINIC | Age: 48
End: 2023-06-14
Payer: COMMERCIAL

## 2023-06-14 VITALS
OXYGEN SATURATION: 99 % | HEIGHT: 71 IN | HEART RATE: 60 BPM | BODY MASS INDEX: 26.88 KG/M2 | TEMPERATURE: 97.8 F | WEIGHT: 192 LBS | SYSTOLIC BLOOD PRESSURE: 116 MMHG | DIASTOLIC BLOOD PRESSURE: 78 MMHG

## 2023-06-14 DIAGNOSIS — Z12.11 SCREENING FOR COLON CANCER: ICD-10-CM

## 2023-06-14 DIAGNOSIS — Z00.00 ANNUAL PHYSICAL EXAM: Primary | ICD-10-CM

## 2023-06-14 DIAGNOSIS — Z13.6 SCREENING FOR CARDIOVASCULAR CONDITION: ICD-10-CM

## 2023-06-14 DIAGNOSIS — K64.8 OTHER HEMORRHOIDS: ICD-10-CM

## 2023-06-14 LAB
ALBUMIN SERPL BCP-MCNC: 4.3 G/DL (ref 3.5–5)
ALP SERPL-CCNC: 42 U/L (ref 46–116)
ALT SERPL W P-5'-P-CCNC: 30 U/L (ref 12–78)
ANION GAP SERPL CALCULATED.3IONS-SCNC: -1 MMOL/L (ref 4–13)
AST SERPL W P-5'-P-CCNC: 16 U/L (ref 5–45)
BASOPHILS # BLD AUTO: 0.01 THOUSANDS/ÂΜL (ref 0–0.1)
BASOPHILS NFR BLD AUTO: 0 % (ref 0–1)
BILIRUB SERPL-MCNC: 0.64 MG/DL (ref 0.2–1)
BUN SERPL-MCNC: 18 MG/DL (ref 5–25)
CALCIUM SERPL-MCNC: 9.5 MG/DL (ref 8.3–10.1)
CHLORIDE SERPL-SCNC: 110 MMOL/L (ref 96–108)
CHOLEST SERPL-MCNC: 206 MG/DL
CO2 SERPL-SCNC: 29 MMOL/L (ref 21–32)
CREAT SERPL-MCNC: 1.04 MG/DL (ref 0.6–1.3)
EOSINOPHIL # BLD AUTO: 0.13 THOUSAND/ÂΜL (ref 0–0.61)
EOSINOPHIL NFR BLD AUTO: 3 % (ref 0–6)
ERYTHROCYTE [DISTWIDTH] IN BLOOD BY AUTOMATED COUNT: 12.2 % (ref 11.6–15.1)
GFR SERPL CREATININE-BSD FRML MDRD: 86 ML/MIN/1.73SQ M
GLUCOSE P FAST SERPL-MCNC: 89 MG/DL (ref 65–99)
HCT VFR BLD AUTO: 45.9 % (ref 36.5–49.3)
HDLC SERPL-MCNC: 62 MG/DL
HGB BLD-MCNC: 15.3 G/DL (ref 12–17)
IMM GRANULOCYTES # BLD AUTO: 0.01 THOUSAND/UL (ref 0–0.2)
IMM GRANULOCYTES NFR BLD AUTO: 0 % (ref 0–2)
LDLC SERPL CALC-MCNC: 132 MG/DL (ref 0–100)
LYMPHOCYTES # BLD AUTO: 1.07 THOUSANDS/ÂΜL (ref 0.6–4.47)
LYMPHOCYTES NFR BLD AUTO: 26 % (ref 14–44)
MCH RBC QN AUTO: 30.4 PG (ref 26.8–34.3)
MCHC RBC AUTO-ENTMCNC: 33.3 G/DL (ref 31.4–37.4)
MCV RBC AUTO: 91 FL (ref 82–98)
MONOCYTES # BLD AUTO: 0.29 THOUSAND/ÂΜL (ref 0.17–1.22)
MONOCYTES NFR BLD AUTO: 7 % (ref 4–12)
NEUTROPHILS # BLD AUTO: 2.59 THOUSANDS/ÂΜL (ref 1.85–7.62)
NEUTS SEG NFR BLD AUTO: 64 % (ref 43–75)
NRBC BLD AUTO-RTO: 0 /100 WBCS
PLATELET # BLD AUTO: 204 THOUSANDS/UL (ref 149–390)
PMV BLD AUTO: 11.4 FL (ref 8.9–12.7)
POTASSIUM SERPL-SCNC: 4.5 MMOL/L (ref 3.5–5.3)
PROT SERPL-MCNC: 7.5 G/DL (ref 6.4–8.4)
RBC # BLD AUTO: 5.03 MILLION/UL (ref 3.88–5.62)
SODIUM SERPL-SCNC: 138 MMOL/L (ref 135–147)
TRIGL SERPL-MCNC: 61 MG/DL
WBC # BLD AUTO: 4.1 THOUSAND/UL (ref 4.31–10.16)

## 2023-06-14 PROCEDURE — 99396 PREV VISIT EST AGE 40-64: CPT | Performed by: FAMILY MEDICINE

## 2023-06-14 RX ORDER — DIAPER,BRIEF,INFANT-TODD,DISP
EACH MISCELLANEOUS 2 TIMES DAILY PRN
Qty: 30 G | Refills: 1 | Status: SHIPPED | OUTPATIENT
Start: 2023-06-14

## 2023-06-14 NOTE — PATIENT INSTRUCTIONS
Wellness Visit for Adults   AMBULATORY CARE:   A wellness visit  is when you see your healthcare provider to get screened for health problems  Your healthcare provider will also give you advice on how to stay healthy  Write down your questions so you remember to ask them  Ask your healthcare provider how often you should have a wellness visit  What happens at a wellness visit:  Your healthcare provider will ask about your health, and your family history of health problems  This includes high blood pressure, heart disease, and cancer  He or she will ask if you have symptoms that concern you, if you smoke, and about your mood  You may also be asked about your intake of medicines, supplements, food, and alcohol  Any of the following may be done:  • Your weight  will be checked  Your height may also be checked so your body mass index (BMI) can be calculated  Your BMI shows if you are at a healthy weight  • Your blood pressure  and heart rate will be checked  Your temperature may also be checked  • Blood and urine tests  may be done  Blood tests may be done to check your cholesterol levels  Abnormal cholesterol levels increase your risk for heart disease and stroke  You may also need a blood or urine test to check for diabetes if you are at increased risk  Urine tests may be done to look for signs of an infection or kidney disease  • A physical exam  includes checking your heartbeat and lungs with a stethoscope  Your healthcare provider may also check your skin to look for sun damage  • Screening tests  may be recommended  A screening test is done to check for diseases that may not cause symptoms  The screening tests you may need depend on your age, gender, family history, and lifestyle habits  For example, colorectal screening may be recommended if you are 48years old or older  Screening tests you need if you are a woman:   • A Pap smear  is used to screen for cervical cancer   Pap smears are usually done every 3 to 5 years depending on your age  You may need them more often if you have had abnormal Pap smear test results in the past  Ask your healthcare provider how often you should have a Pap smear  • A mammogram  is an x-ray of your breasts to screen for breast cancer  Experts recommend mammograms every 2 years starting at age 48 years  You may need a mammogram at age 52 years or younger if you have an increased risk for breast cancer  Talk to your healthcare provider about when you should start having mammograms and how often you need them  Vaccines you may need:   • Get an influenza vaccine  every year  The influenza vaccine protects you from the flu  Several types of viruses cause the flu  The viruses change over time, so new vaccines are made each year  • Get a tetanus-diphtheria (Td) booster vaccine  every 10 years  This vaccine protects you against tetanus and diphtheria  Tetanus is a severe infection that may cause painful muscle spasms and lockjaw  Diphtheria is a severe bacterial infection that causes a thick covering in the back of your mouth and throat  • Get a human papillomavirus (HPV) vaccine  if you are female and aged 23 to 32 or male 23 to 24 and never received it  This vaccine protects you from HPV infection  HPV is the most common infection spread by sexual contact  HPV may also cause vaginal, penile, and anal cancers  • Get a pneumococcal vaccine  if you are aged 72 years or older  The pneumococcal vaccine is an injection given to protect you from pneumococcal disease  Pneumococcal disease is an infection caused by pneumococcal bacteria  The infection may cause pneumonia, meningitis, or an ear infection  • Get a shingles vaccine  if you are 60 or older, even if you have had shingles before  The shingles vaccine is an injection to protect you from the varicella-zoster virus  This is the same virus that causes chickenpox   Shingles is a painful rash that develops in people who had chickenpox or have been exposed to the virus  How to eat healthy:  My Plate is a model for planning healthy meals  It shows the types and amounts of foods that should go on your plate  Fruits and vegetables make up about half of your plate, and grains and protein make up the other half  A serving of dairy is included on the side of your plate  The amount of calories and serving sizes you need depends on your age, gender, weight, and height  Examples of healthy foods are listed below:  • Eat a variety of vegetables  such as dark green, red, and orange vegetables  You can also include canned vegetables low in sodium (salt) and frozen vegetables without added butter or sauces  • Eat a variety of fresh fruits , canned fruit in 100% juice, frozen fruit, and dried fruit  • Include whole grains  At least half of the grains you eat should be whole grains  Examples include whole-wheat bread, wheat pasta, brown rice, and whole-grain cereals such as oatmeal     • Eat a variety of protein foods such as seafood (fish and shellfish), lean meat, and poultry without skin (turkey and chicken)  Examples of lean meats include pork leg, shoulder, or tenderloin, and beef round, sirloin, tenderloin, and extra lean ground beef  Other protein foods include eggs and egg substitutes, beans, peas, soy products, nuts, and seeds  • Choose low-fat dairy products such as skim or 1% milk or low-fat yogurt, cheese, and cottage cheese  • Limit unhealthy fats  such as butter, hard margarine, and shortening  Exercise:  Exercise at least 30 minutes per day on most days of the week  Some examples of exercise include walking, biking, dancing, and swimming  You can also fit in more physical activity by taking the stairs instead of the elevator or parking farther away from stores  Include muscle strengthening activities 2 days each week  Regular exercise provides many health benefits   It helps you manage your weight, and decreases your risk for type 2 diabetes, heart disease, stroke, and high blood pressure  Exercise can also help improve your mood  Ask your healthcare provider about the best exercise plan for you  General health and safety guidelines:   • Do not smoke  Nicotine and other chemicals in cigarettes and cigars can cause lung damage  Ask your healthcare provider for information if you currently smoke and need help to quit  E-cigarettes or smokeless tobacco still contain nicotine  Talk to your healthcare provider before you use these products  • Limit alcohol  A drink of alcohol is 12 ounces of beer, 5 ounces of wine, or 1½ ounces of liquor  • Lose weight, if needed  Being overweight increases your risk of certain health conditions  These include heart disease, high blood pressure, type 2 diabetes, and certain types of cancer  • Protect your skin  Do not sunbathe or use tanning beds  Use sunscreen with a SPF 15 or higher  Apply sunscreen at least 15 minutes before you go outside  Reapply sunscreen every 2 hours  Wear protective clothing, hats, and sunglasses when you are outside  • Drive safely  Always wear your seatbelt  Make sure everyone in your car wears a seatbelt  A seatbelt can save your life if you are in an accident  Do not use your cell phone when you are driving  This could distract you and cause an accident  Pull over if you need to make a call or send a text message  • Practice safe sex  Use latex condoms if are sexually active and have more than one partner  Your healthcare provider may recommend screening tests for sexually transmitted infections (STIs)  • Wear helmets, lifejackets, and protective gear  Always wear a helmet when you ride a bike or motorcycle, go skiing, or play sports that could cause a head injury  Wear protective equipment when you play sports  Wear a lifejacket when you are on a boat or doing water sports      © Copyright [employer] 2022 Information is for End User's use only and may not be sold, redistributed or otherwise used for commercial purposes  The above information is an  only  It is not intended as medical advice for individual conditions or treatments  Talk to your doctor, nurse or pharmacist before following any medical regimen to see if it is safe and effective for you  Heart Healthy Diet   AMBULATORY CARE:   A heart healthy diet  is an eating plan low in unhealthy fats and sodium (salt)  The plan is high in healthy fats and fiber  A heart healthy diet helps improve your cholesterol levels and lowers your risk for heart disease and stroke  A dietitian will teach you how to read and understand food labels  Heart healthy diet guidelines to follow:   • Choose foods that contain healthy fats:      ? Unsaturated fats  include monounsaturated and polyunsaturated fats  Unsaturated fat is found in foods such as soybean, canola, olive, corn, and safflower oils  It is also found in soft tub margarine that is made with liquid vegetable oil  ? Omega-3 fat  is found in certain fish, such as salmon, tuna, and trout, and in walnuts and flaxseed  Eat fish high in omega-3 fats at least 2 times a week  • Limit or do not have unhealthy fats:      ? Cholesterol  is found in animal foods, such as eggs and lobster, and in dairy products made from whole milk  Limit cholesterol to less than 200 mg each day  ? Saturated fat  is found in meats, such as mcintyre and hamburger  It is also found in chicken or turkey skin, whole milk, and butter  Limit saturated fat to less than 7% of your total daily calories  ? Trans fat  is found in packaged foods, such as potato chips and cookies  It is also in hard margarine, some fried foods, and shortening  Do not eat foods that contain trans fats  • Get 20 to 30 grams of fiber each day  Fruits, vegetables, whole-grain foods, and legumes (cooked beans) are good sources of fiber           • Limit sodium as directed  You may be told to limit sodium, such as to 2,000 mg or less each day  Choose low-sodium or no-salt-added foods  Add little or no salt to food you prepare  Use herbs and spices in place of salt  Include the following in your heart healthy plan:  Ask your dietitian or healthcare provider how many servings to have each day from the following food groups:  • Grains:      ? Whole-wheat breads, cereals, and pastas, and brown rice    ? Low-fat, low-sodium crackers and chips    • Vegetables:      ? Broccoli, green beans, green peas, and spinach    ? Collards, kale, and lima beans    ? Carrots, sweet potatoes, tomatoes, and peppers    ? Canned vegetables with no salt added    • Fruits:      ? Bananas, peaches, pears, and pineapple    ? Grapes, raisins, and dates    ? Oranges, tangerines, grapefruit, orange juice, and grapefruit juice    ? Apricots, mangoes, melons, and papaya    ? Raspberries and strawberries    ? Canned fruit with no added sugar    • Low-fat dairy:      ? Nonfat (skim) milk, 1% milk, and low-fat almond, cashew, or soy milks fortified with calcium    ? Low-fat cheese, regular or frozen yogurt, and cottage cheese    • Meats and proteins:      ? Lean cuts of beef and pork (loin, leg, round), skinless chicken and turkey    ? Legumes, soy products, egg whites, or nuts    Limit or do not include the following in your heart healthy plan:   • Foods and liquids that contain unhealthy fats and oils:      ? Whole or 2% milk, cream cheese, sour cream, or cheese    ? High-fat cuts of beef (T-bone steaks, ribs), chicken or turkey with skin, and organ meats such as liver    ? Butter, stick margarine, shortening, and cooking oils such as coconut or palm oil    • Foods and liquids high in sodium:      ? Packaged foods, such as frozen dinners, cookies, macaroni and cheese, and cereals with more than 300 mg of sodium per serving    ?  Vegetables with added sodium, such as instant potatoes, vegetables with added sauces, or regular canned vegetables    ? Cured or smoked meats, such as hot dogs, mcintyre, and sausage    ? High-sodium ketchup, barbecue sauce, salad dressing, pickles, olives, soy sauce, or miso    • Foods and liquids high in sugar:      ? Candy, cake, cookies, pies, or doughnuts    ? Soft drinks (soda), sports drinks, or sweetened tea    ? Canned or dry mixes for cakes, soups, sauces, or gravies    Other healthy heart guidelines:   • Do not smoke  Nicotine and other chemicals in cigarettes and cigars can cause lung and heart damage  Ask your healthcare provider for information if you currently smoke and need help to quit  E-cigarettes or smokeless tobacco still contain nicotine  Talk to your provider before you use these products  • Limit or do not drink alcohol as directed  Alcohol can damage your heart and raise your blood pressure  Your healthcare provider may give you specific daily and weekly limits  The general recommended limit is 1 drink a day for women 21 or older and for men 72 or older  Do not have more than 3 drinks within 24 hours or 7 within a week  The recommended limit is 2 drinks a day for men 24to 59years of age  Do not have more than 4 drinks within 24 hours or 14 within a week  A drink of alcohol is 12 ounces of beer, 5 ounces of wine, or 1½ ounces of liquor  • Maintain a healthy weight  Extra body weight makes your heart work harder  Ask your provider what a healthy weight is for you  He or she can help you create a safe weight loss plan, if needed  • Exercise regularly  Exercise can help you maintain a healthy weight and improve your blood pressure and cholesterol levels  Regular exercise can also decrease your risk for heart problems  Ask your provider about the best exercise plan for you  Do not start an exercise program without asking your provider            Follow up with your doctor or cardiologist as directed:  Write down your questions so you remember to ask them during your visits  © Amelia Irwin 2022 Information is for End User's use only and may not be sold, redistributed or otherwise used for commercial purposes  The above information is an  only  It is not intended as medical advice for individual conditions or treatments  Talk to your doctor, nurse or pharmacist before following any medical regimen to see if it is safe and effective for you

## 2023-06-14 NOTE — PROGRESS NOTES
ADULT ANNUAL [address] PRIMARY CARE    NAME: Renita Robert  AGE: 41 y o  SEX: male  : 1975     DATE: 2023     Assessment and Plan:   Lab work and [employer] ordered  For his intermittent hemorrhoids, Rx for hydrocortisone cream to use as needed  Continue to use the Metamucil and increase fluids  He will call us if they continue or increase  For his left submandibular nodule, and mild tenderness, he will call us in the next week if symptoms continue or increase  If so, I will do further work-up, and/or referral to ENT  Continue to watch diet and continue to exercise  Follow-up yearly and as needed  Problem List Items Addressed This Visit        Digestive    Other hemorrhoids    Relevant Medications    hydrocortisone 1 % cream   Other Visit Diagnoses     Annual physical exam    -  Primary    Screening for colon cancer        Relevant Orders    Cologuard    Screening for cardiovascular condition        BMI 26 0-26 9,adult              Immunizations and preventive care screenings were discussed with patient today  Appropriate education was printed on patient's after visit summary  Counseling:  Dental Health: discussed importance of regular tooth brushing, flossing, and dental visits  Exercise: the importance of regular exercise/physical activity was discussed  Recommend exercise 3-5 times per week for at least 30 minutes  BMI Counseling: Body mass index is 26 78 kg/m²  The BMI is above normal  Nutrition recommendations include decreasing portion sizes, encouraging healthy choices of fruits and vegetables, decreasing fast food intake, consuming healthier snacks, limiting drinks that contain sugar, moderation in carbohydrate intake, increasing intake of lean protein, reducing intake of saturated and trans fat and reducing intake of cholesterol  Exercise recommendations include exercising 3-5 times per week  No pharmacotherapy was ordered  Rationale for BMI follow-up plan is due to patient being overweight or obese  Depression Screening and Follow-up Plan: Patient was screened for depression during today's encounter  They screened negative with a PHQ-2 score of 0  Return in 1 year (on 6/14/2024), or if symptoms worsen or fail to improve, for Annual physical      Chief Complaint:     Chief Complaint   Patient presents with   • Well Check      History of Present Illness:     Adult Annual Physical   Patient here for a comprehensive physical exam  The patient reports problems - L submandibular tenderness past week, improving : hemorrhoids  Diet and Physical Activity  Diet/Nutrition: well balanced diet  Exercise: 3-4 times a week on average  Depression Screening  PHQ-2/9 Depression Screening    Little interest or pleasure in doing things: 0 - not at all  Feeling down, depressed, or hopeless: 0 - not at all  PHQ-2 Score: 0  PHQ-2 Interpretation: Negative depression screen       General Health  Sleep: sleeps well  Hearing: normal - bilateral   Vision: no vision problems  Dental: regular dental visits   Health  Symptoms include: none     Review of Systems:     Review of Systems   Constitutional: Negative  HENT:        Patient states for the past week has some tenderness underneath his left jaw  He states it is improving  No fever or chills  Does not hurt him to swallow  No URI symptoms   Respiratory: Negative  Cardiovascular: Negative  Gastrointestinal:        Patient has been experiencing intermittent hemorrhoids over the past couple years  They itch him and irritate him  He does see some blood sometimes on the tissue  He has been using witch hazel and lidocaine cream to calm them down  He has been taking Metamucil also  Denies any significant constipation   Genitourinary: Negative         Past Medical History:     Past Medical History:   Diagnosis Date   • Known health problems: none    • Rupture of right distal biceps tendon 12/16/2021      Past Surgical History:     Past Surgical History:   Procedure Laterality Date   • ESOPHAGOGASTRODUODENOSCOPY     • LASIK     • DE RINSJ RPTD BICEPS/TRICEPS TDN DSTL W/WO TDN GRF Right 12/21/2021    Procedure: DISTAL BICEPS TENDON REPAIR;  Surgeon: Rodri Boyle MD;  Location: [facility];  Service: Orthopedics      Family History:     Family History   Problem Relation Age of Onset   • No Known Problems Mother    • No Known Problems Father       Social History:     Social History     Socioeconomic History   • Marital status: /Civil Union     Spouse name: None   • Number of children: None   • Years of education: None   • Highest education level: None   Occupational History   • None   Tobacco Use   • Smoking status: Never   • Smokeless tobacco: Never   Vaping Use   • Vaping Use: Never used   Substance and Sexual Activity   • Alcohol use: Yes     Comment: 4-5x weekly, one drink each time   • Drug use: No   • Sexual activity: None     Comment: defer   Other Topics Concern   • None   Social History Narrative        Cat and dog    Always uses seat belt    Caffeine use    Living will    [employer]     Social Determinants of Health     Financial Resource Strain: Not on file   Food Insecurity: Not on file   Transportation Needs: Not on file   Physical Activity: Not on file   Stress: Not on file   Social Connections: Not on file   Intimate Partner Violence: Not on file   Housing Stability: Not on file      Current Medications:     Current Outpatient Medications   Medication Sig Dispense Refill   • hydrocortisone 1 % cream Apply topically 2 (two) times a day as needed for irritation (hemorrhoids) 30 g 1   • clotrimazole (LOTRIMIN) 1 % cream Apply topically 2 (two) times a day (Patient not taking: Reported on 6/14/2023) 85 g 2     No current facility-administered medications for this visit        Allergies:     No Known Allergies   Physical Exam:     /78   Pulse "60   Temp 97 8 °F (36 6 °C)   Ht 5' 11\" (1 803 m)   Wt 87 1 kg (192 lb)   SpO2 99%   BMI 26 78 kg/m²     Physical Exam  Vitals reviewed  Constitutional:       General: He is not in acute distress  Appearance: Normal appearance  He is well-developed  He is not diaphoretic  HENT:      Head: Normocephalic and atraumatic  Mouth/Throat:      Comments: Subcentimeter nodule in the left submandibular area  Minimally tender  Eyes:      Conjunctiva/sclera: Conjunctivae normal    Cardiovascular:      Rate and Rhythm: Normal rate and regular rhythm  Heart sounds: Normal heart sounds  No murmur heard  No friction rub  No gallop  Pulmonary:      Effort: Pulmonary effort is normal  No respiratory distress  Breath sounds: Normal breath sounds  No wheezing, rhonchi or rales  Musculoskeletal:      Right lower leg: No edema  Left lower leg: No edema  Neurological:      General: No focal deficit present  Mental Status: He is alert and oriented to person, place, and time  Psychiatric:         Mood and Affect: Mood normal          Behavior: Behavior normal          Thought Content: Thought content normal          Judgment: Judgment normal           Henna Barboza DO  [facility]  BMI Counseling: Body mass index is 26 78 kg/m²  The BMI is above normal  Nutrition recommendations include reducing portion sizes, decreasing overall calorie intake, 3-5 servings of fruits/vegetables daily, reducing fast food intake, consuming healthier snacks, decreasing soda and/or juice intake, moderation in carbohydrate intake, increasing intake of lean protein, reducing intake of saturated fat and trans fat and reducing intake of cholesterol  Exercise recommendations include exercising 3-5 times per week    "

## 2023-06-15 DIAGNOSIS — D72.819 LEUKOPENIA, UNSPECIFIED TYPE: Primary | ICD-10-CM

## 2023-09-11 ENCOUNTER — TELEPHONE (OUTPATIENT)
Dept: FAMILY MEDICINE CLINIC | Facility: CLINIC | Age: 48
End: 2023-09-11

## 2023-09-11 NOTE — TELEPHONE ENCOUNTER
----- Message from Charmayne Lacks sent at 9/11/2023  2:53 PM EDT -----  Regarding: FW: blood work  Contact: [phone]    ----- Message -----  From: Lauren Packer  Sent: 9/11/2023   2:49 PM EDT  To: [facility]  Subject: blood work                                       Hi - I just wanted to confirm that I should go for additional blood work based on my 6/14/23 test results?   If so, do I need anything from your office prior to going for the test?    Thanks,    Wali Newman

## 2023-09-14 ENCOUNTER — OFFICE VISIT (OUTPATIENT)
Dept: FAMILY MEDICINE CLINIC | Facility: CLINIC | Age: 48
End: 2023-09-14
Payer: COMMERCIAL

## 2023-09-14 ENCOUNTER — LAB (OUTPATIENT)
Dept: LAB | Facility: HOSPITAL | Age: 48
End: 2023-09-14
Payer: COMMERCIAL

## 2023-09-14 ENCOUNTER — TELEPHONE (OUTPATIENT)
Dept: FAMILY MEDICINE CLINIC | Facility: CLINIC | Age: 48
End: 2023-09-14

## 2023-09-14 VITALS
OXYGEN SATURATION: 99 % | HEIGHT: 71 IN | WEIGHT: 185 LBS | SYSTOLIC BLOOD PRESSURE: 118 MMHG | TEMPERATURE: 98.4 F | HEART RATE: 75 BPM | BODY MASS INDEX: 25.9 KG/M2 | DIASTOLIC BLOOD PRESSURE: 74 MMHG

## 2023-09-14 DIAGNOSIS — D72.89 ATYPICAL LYMPHOCYTES PRESENT ON PERIPHERAL BLOOD SMEAR: ICD-10-CM

## 2023-09-14 DIAGNOSIS — R51.9 PRESSURE IN HEAD: ICD-10-CM

## 2023-09-14 DIAGNOSIS — D72.819 LEUKOPENIA, UNSPECIFIED TYPE: ICD-10-CM

## 2023-09-14 DIAGNOSIS — H53.9 VISION CHANGES: Primary | ICD-10-CM

## 2023-09-14 DIAGNOSIS — R71.8 RED BLOOD CELL MORPHOLOGY ABNORMALITY: Primary | ICD-10-CM

## 2023-09-14 LAB
ANISOCYTOSIS BLD QL SMEAR: PRESENT
BASOPHILS # BLD AUTO: 0.01 THOUSANDS/ÂΜL (ref 0–0.1)
BASOPHILS NFR BLD AUTO: 0 % (ref 0–1)
BURR CELLS BLD QL SMEAR: PRESENT
EOSINOPHIL # BLD AUTO: 0.09 THOUSAND/ÂΜL (ref 0–0.61)
EOSINOPHIL NFR BLD AUTO: 3 % (ref 0–6)
ERYTHROCYTE [DISTWIDTH] IN BLOOD BY AUTOMATED COUNT: 12.1 % (ref 11.6–15.1)
HCT VFR BLD AUTO: 43.9 % (ref 36.5–49.3)
HGB BLD-MCNC: 15 G/DL (ref 12–17)
IMM EOSINOPHIL NFR BLD MANUAL: 1 % (ref 0–6)
IMM GRANULOCYTES # BLD AUTO: 0.01 THOUSAND/UL (ref 0–0.2)
IMM GRANULOCYTES NFR BLD AUTO: 0 % (ref 0–2)
LG PLATELETS BLD QL SMEAR: PRESENT
LYMPHOCYTES # BLD AUTO: 1.22 THOUSANDS/ÂΜL (ref 0.6–4.47)
LYMPHOCYTES NFR BLD AUTO: 34 % (ref 14–44)
LYMPHOCYTES NFR BLD: 42 % (ref 14–44)
MCH RBC QN AUTO: 30.8 PG (ref 26.8–34.3)
MCHC RBC AUTO-ENTMCNC: 34.2 G/DL (ref 31.4–37.4)
MCV RBC AUTO: 90 FL (ref 82–98)
MONOCYTES # BLD AUTO: 0.28 THOUSAND/ÂΜL (ref 0.17–1.22)
MONOCYTES NFR BLD AUTO: 1 % (ref 4–12)
MONOCYTES NFR BLD AUTO: 8 % (ref 4–12)
NEUTROPHILS # BLD AUTO: 1.97 THOUSANDS/ÂΜL (ref 1.85–7.62)
NEUTS SEG NFR BLD AUTO: 52 % (ref 45–77)
NEUTS SEG NFR BLD AUTO: 55 % (ref 43–75)
NRBC BLD AUTO-RTO: 0 /100 WBCS
PLATELET # BLD AUTO: 203 THOUSANDS/UL (ref 149–390)
PMV BLD AUTO: 10.4 FL (ref 8.9–12.7)
POIKILOCYTOSIS BLD QL SMEAR: PRESENT
RBC # BLD AUTO: 4.87 MILLION/UL (ref 3.88–5.62)
RBC MORPH BLD: PRESENT
TOTAL CELLS COUNTED SPEC: 100
VARIANT LYMPHS BLD QL SMEAR: 4 % (ref 0–0)
WBC # BLD AUTO: 3.58 THOUSAND/UL (ref 4.31–10.16)

## 2023-09-14 PROCEDURE — 99213 OFFICE O/P EST LOW 20 MIN: CPT | Performed by: FAMILY MEDICINE

## 2023-09-14 PROCEDURE — 36415 COLL VENOUS BLD VENIPUNCTURE: CPT

## 2023-09-14 PROCEDURE — 85007 BL SMEAR W/DIFF WBC COUNT: CPT

## 2023-09-14 PROCEDURE — 85025 COMPLETE CBC W/AUTO DIFF WBC: CPT

## 2023-09-14 NOTE — PROGRESS NOTES
Name: Irina Ortega      : 1975      MRN: [id number]  Encounter Provider: Sita Guillen DO  Encounter Date: 2023   Encounter department: [facility]    Assessment & Plan   Recent CBC showed his white count is slightly lower at 3.58. I will see if we can add a peripheral smear onto his CBC. For his vision changes, refer to ophthalmology. He will call us if he continues to have increased symptoms or any other changes. I will see him back in the next month or as needed  1. Vision changes  -     Ambulatory Referral to Ophthalmology; Future    2. Leukopenia, unspecified type  -     Peripheral Smear; Future    3. Pressure in head           Subjective     Patient here today stating for the last month or so has noticed vision changes. Has trouble seeing near. He has tried readers, that seems to make things worse. He gets a pressure feeling in the top of his head, almost like a fogginess. His right eye is worse than his left eye. Patient denies any increased congestion. Patient did have LASEK surgery on his eyes 6 years ago. He had an eye exam about 2 months ago which was normal.  Patient had his CBC repeated today and shows his white count is lower at 3.58. His other lab work that he had done in  was normal    Headache    Review of Systems   Constitutional: Negative. Respiratory: Negative. Cardiovascular: Negative. Gastrointestinal: Negative. Genitourinary: Negative. Neurological: Positive for headaches.        Past Medical History:   Diagnosis Date   • Known health problems: none    • Rupture of right distal biceps tendon 2021     Past Surgical History:   Procedure Laterality Date   • ESOPHAGOGASTRODUODENOSCOPY     • LASIK     • [place] RINSJ RPTD BICEPS/TRICEPS TDN DSTL W/WO TDN GRF Right 2021    Procedure: DISTAL BICEPS TENDON REPAIR;  Surgeon: Sharad Henderson MD;  [facility];  Service: Orthopedics     Family History   Problem Relation Age of Onset   • No Known Problems Mother    • No Known Problems Father      Social History     Socioeconomic History   • Marital status: /Civil Union     Spouse name: None   • Number of children: None   • Years of education: None   • Highest education level: None   Occupational History   • None   Tobacco Use   • Smoking status: Never   • Smokeless tobacco: Never   Vaping Use   • Vaping Use: Never used   Substance and Sexual Activity   • Alcohol use: Yes     Comment: 4-5x weekly, one drink each time   • Drug use: No   • Sexual activity: None     Comment: defer   Other Topics Concern   • None   Social History Narrative        Cat and dog    Always uses seat belt    Caffeine use    Living will    Yarsanism     Social Determinants of Health     Financial Resource Strain: Not on file   Food Insecurity: Not on file   Transportation Needs: Not on file   Physical Activity: Not on file   Stress: Not on file   Social Connections: Not on file   Intimate Partner Violence: Not on file   Housing Stability: Not on file     Current Outpatient Medications on File Prior to Visit   Medication Sig   • hydrocortisone 1 % cream Apply topically 2 (two) times a day as needed for irritation (hemorrhoids)   • [DISCONTINUED] clotrimazole (LOTRIMIN) 1 % cream Apply topically 2 (two) times a day (Patient not taking: Reported on 6/14/2023)     No Known Allergies  Immunization History   Administered Date(s) Administered   • COVID-19 MODERNA VACC 0.5 ML IM 03/12/2021, 04/09/2021   • INFLUENZA 01/06/2012, 01/10/2013, 09/19/2014, 11/10/2015, 12/28/2016, 01/14/2019, 09/30/2020   • Influenza Injectable, MDCK, Preservative Free, Quadrivalent, 0.5 mL 01/14/2019   • Influenza LAIV (Nasal) 09/19/2014   • Influenza Quadrivalent 3 years and older 01/16/2014   • Influenza Quadrivalent Preservative Free 3 years and older IM 10/20/2017   • Td (adult), adsorbed 06/02/1987   • Tdap 05/10/2013, 07/28/2022       Objective     /74   Pulse 75 Temp 98.4 °F (36.9 °C)   Ht 5' 11" (1.803 m)   Wt 83.9 kg (185 lb)   SpO2 99%   BMI 25.80 kg/m²     Physical Exam  Vitals reviewed. Constitutional:       General: He is not in acute distress. Appearance: Normal appearance. He is well-developed. He is not ill-appearing, toxic-appearing or diaphoretic. HENT:      Head: Normocephalic and atraumatic. Eyes:      Extraocular Movements: Extraocular movements intact. Conjunctiva/sclera: Conjunctivae normal.      Pupils: Pupils are equal, round, and reactive to light. Cardiovascular:      Rate and Rhythm: Normal rate and regular rhythm. Heart sounds: Normal heart sounds. No murmur heard. No friction rub. No gallop. Pulmonary:      Effort: Pulmonary effort is normal. No respiratory distress. Breath sounds: Normal breath sounds. No wheezing, rhonchi or rales. Musculoskeletal:      Right lower leg: No edema. Left lower leg: No edema. Neurological:      General: No focal deficit present. Mental Status: He is alert and oriented to person, place, and time. Psychiatric:         Mood and Affect: Mood normal.         Behavior: Behavior normal.         Thought Content:  Thought content normal.         Judgment: Judgment normal.       Sedonia Grumet, DO

## 2023-09-14 NOTE — TELEPHONE ENCOUNTER
Please call lab. Patient had a CBC done today. Please ask them if they can add a peripheral smear. I put in the order. Let me know if they can do this.

## 2023-09-15 ENCOUNTER — TELEPHONE (OUTPATIENT)
Dept: FAMILY MEDICINE CLINIC | Facility: CLINIC | Age: 48
End: 2023-09-15

## 2023-09-15 ENCOUNTER — TELEPHONE (OUTPATIENT)
Dept: HEMATOLOGY ONCOLOGY | Facility: CLINIC | Age: 48
End: 2023-09-15

## 2023-09-15 NOTE — TELEPHONE ENCOUNTER
I called Wali in response to a referral that was received for patient to establish care with Hematology. Outreach was made to schedule a consultation. I spoke with the patient's wife, Andrew Bowling, and was able to provide her with all of our locations. Andrew Bowling will be speaking with the patient and then having him give us a call back to schedule. [facility] number provided. Another attempt will be made to contact patient.

## 2023-09-15 NOTE — TELEPHONE ENCOUNTER
Honestly, I do not know.   I just saw that there was abnormalities seen on his peripheral smear, and I want to make sure that we are covering all bases

## 2023-09-15 NOTE — TELEPHONE ENCOUNTER
----- Message from Huan Hernandez sent at 9/15/2023  3:27 PM EDT -----  Regarding: FW: Question  Contact: [phone]    ----- Message -----  From: Helena Dejesus  Sent: 9/15/2023   3:23 PM EDT  To: [facility]  Subject: Question                                         Hi - I was able to schedule an appointment with the [facility] office in [facility] on October 9th. Do I have a suspected diagnosis?       Thanks     [employer]

## 2023-09-20 DIAGNOSIS — Z00.00 ROUTINE GENERAL MEDICAL EXAMINATION AT A HEALTH CARE FACILITY: Primary | ICD-10-CM

## 2023-09-21 ENCOUNTER — OPTICAL OFFICE (OUTPATIENT)
Dept: URBAN - NONMETROPOLITAN AREA CLINIC 4 | Facility: CLINIC | Age: 48
Setting detail: OPHTHALMOLOGY
End: 2023-09-21
Payer: COMMERCIAL

## 2023-09-21 DIAGNOSIS — H52.13: ICD-10-CM

## 2023-09-21 LAB — COLOGUARD RESULT REPORTABLE: NEGATIVE

## 2023-09-21 PROCEDURE — V2020 VISION SVCS FRAMES PURCHASES: HCPCS | Performed by: OPHTHALMOLOGY

## 2023-09-21 PROCEDURE — V2103 SPHEROCYLINDR 4.00D/12-2.00D: HCPCS | Performed by: OPHTHALMOLOGY

## 2023-09-21 PROCEDURE — V2025 EYEGLASSES DELUX FRAMES: HCPCS | Performed by: OPHTHALMOLOGY

## 2023-09-21 PROCEDURE — V2750 ANTI-REFLECTIVE COATING: HCPCS | Performed by: OPHTHALMOLOGY

## 2023-09-22 ENCOUNTER — TELEPHONE (OUTPATIENT)
Dept: FAMILY MEDICINE CLINIC | Facility: CLINIC | Age: 48
End: 2023-09-22

## 2023-09-22 NOTE — TELEPHONE ENCOUNTER
Received inbasket msg from patient regarding a new diagnosis for his 6/14/23 labs, requesting to be updated with preventative diagnosis code. New labs updated and emailed to billing on 9/22/23. Spoke with wife, Ricardo Maldonado to let her know that I sent an email with an updated script to billing and will call her when I hear back from them.

## 2023-10-09 ENCOUNTER — OFFICE VISIT (OUTPATIENT)
Dept: HEMATOLOGY ONCOLOGY | Facility: CLINIC | Age: 48
End: 2023-10-09
Payer: COMMERCIAL

## 2023-10-09 VITALS
SYSTOLIC BLOOD PRESSURE: 130 MMHG | DIASTOLIC BLOOD PRESSURE: 98 MMHG | HEART RATE: 73 BPM | OXYGEN SATURATION: 99 % | WEIGHT: 186.8 LBS | TEMPERATURE: 98.5 F | BODY MASS INDEX: 26.15 KG/M2 | RESPIRATION RATE: 17 BRPM | HEIGHT: 71 IN

## 2023-10-09 DIAGNOSIS — D72.819 LEUKOPENIA, UNSPECIFIED TYPE: ICD-10-CM

## 2023-10-09 DIAGNOSIS — D72.89 ATYPICAL LYMPHOCYTES PRESENT ON PERIPHERAL BLOOD SMEAR: Primary | ICD-10-CM

## 2023-10-09 DIAGNOSIS — R71.8 RED BLOOD CELL MORPHOLOGY ABNORMALITY: ICD-10-CM

## 2023-10-09 PROCEDURE — 99205 OFFICE O/P NEW HI 60 MIN: CPT | Performed by: INTERNAL MEDICINE

## 2023-10-09 NOTE — PROGRESS NOTES
Murphy Baumgarten Stehr  1975  [facility] [place]  [address] [address] [phone]    Chief Complaint   Patient presents with   • Consult           Oncology History    No history exists. History of Present Illness:  Patient was in his usual state of health when he had routine blood work on June 14, 2023 WBC 4.1, hemoglobin 15.3, platelet count 610, normal differential.  CMP normal.  Similar values seen in September 2023. Peripheral smear shows 4% atypical lymphocytes, large platelets, anisocytosis, nidia cells. No relevant imaging. Review of Systems   Constitutional: Negative for chills and fever. HENT: Negative for nosebleeds. Eyes: Negative for discharge. Respiratory: Negative for cough and shortness of breath. Cardiovascular: Negative for chest pain. Gastrointestinal: Negative for abdominal pain, constipation and diarrhea. Endocrine: Negative for polydipsia. Genitourinary: Negative for hematuria. Musculoskeletal: Negative for arthralgias. Skin: Negative for color change. Allergic/Immunologic: Negative for immunocompromised state. Neurological: Negative for dizziness and headaches. Hematological: Negative for adenopathy. Psychiatric/Behavioral: Negative for agitation.        Patient Active Problem List   Diagnosis   • Tear of right supraspinatus tendon   • Other hemorrhoids     Past Medical History:   Diagnosis Date   • Known health problems: none    • Rupture of right distal biceps tendon 12/16/2021     Past Surgical History:   Procedure Laterality Date   • ESOPHAGOGASTRODUODENOSCOPY     • LASIK     • [place] RINSJ RPTD BICEPS/TRICEPS TDN DSTL W/WO TDN GRF Right 12/21/2021    Procedure: DISTAL BICEPS TENDON REPAIR;  Surgeon: Blanka Naylor MD;  Location[facility];  Service: Orthopedics     Family History   Problem Relation Age of Onset   • No Known Problems Mother    • No Known Problems Father Social History     Socioeconomic History   • Marital status: /Civil Union     Spouse name: Not on file   • Number of children: Not on file   • Years of education: Not on file   • Highest education level: Not on file   Occupational History   • Not on file   Tobacco Use   • Smoking status: Never   • Smokeless tobacco: Never   Vaping Use   • Vaping Use: Never used   Substance and Sexual Activity   • Alcohol use: Yes     Comment: 4-5x weekly, one drink each time   • Drug use: No   • Sexual activity: Not on file     Comment: defer   Other Topics Concern   • Not on file   Social History Narrative        Cat and dog    Always uses seat belt    Caffeine use    Living will    [employer]     Social Determinants of Health     Financial Resource Strain: Not on file   Food Insecurity: Not on file   Transportation Needs: Not on file   Physical Activity: Not on file   Stress: Not on file   Social Connections: Not on file   Intimate Partner Violence: Not on file   Housing Stability: Not on file       Current Outpatient Medications:   •  hydrocortisone 1 % cream, Apply topically 2 (two) times a day as needed for irritation (hemorrhoids), Disp: 30 g, Rfl: 1  No Known Allergies  Vitals:    10/09/23 1551   BP: 130/98   Pulse: 73   Resp: 17   Temp: 98.5 °F (36.9 °C)   SpO2: 99%       Physical Exam  Constitutional:       Appearance: He is well-developed. HENT:      Head: Normocephalic and atraumatic. Mouth/Throat:      Mouth: Mucous membranes are moist.   Eyes:      Pupils: Pupils are equal, round, and reactive to light. Cardiovascular:      Rate and Rhythm: Normal rate and regular rhythm. Heart sounds: No murmur heard. Pulmonary:      Breath sounds: Normal breath sounds. No wheezing or rales. Abdominal:      Palpations: Abdomen is soft. Tenderness: There is no abdominal tenderness. Musculoskeletal:         General: No tenderness. Normal range of motion. Cervical back: Neck supple. Lymphadenopathy:      Cervical: No cervical adenopathy. Skin:     Findings: No erythema or rash. Neurological:      Mental Status: He is alert and oriented to person, place, and time. Cranial Nerves: No cranial nerve deficit. Deep Tendon Reflexes: Reflexes are normal and symmetric. Psychiatric:         Behavior: Behavior normal.           Labs:  CBC, Coags, BMP, Mg, Phos      Imaging  No results found. I reviewed the above laboratory and imaging data. Discussion/Summary: In summary, this is a 37-year-old male with a history of recent incidental finding of leukopenia with occasional atypical lymphocytes. [place] cells are also noted. Differential diagnosis for nidia cells includes rare enzyme deficiencies or, more likely, properly prepared blood smear. Uremia or status post splenectomy are ruled out. Differential diagnosis for atypical lymphocytes includes viral infection such as EBV, CMV, viral hepatitis or HIV, lymphoproliferative disorders, autoimmune conditions. Studies are requested for the same. Peripheral blood flow cytometry is also requested. I explained that this could be helpful if positive, but if negative would not rule out the possibility of underlying lymphoproliferative disorder. I asked the patient to call a few days after he has blood work done to pursue any indicated testing. The patient and his wife voiced understanding and agreement with the above.

## 2023-10-10 ENCOUNTER — APPOINTMENT (OUTPATIENT)
Dept: LAB | Facility: HOSPITAL | Age: 48
End: 2023-10-10
Payer: COMMERCIAL

## 2023-10-10 DIAGNOSIS — D72.89 ATYPICAL LYMPHOCYTES PRESENT ON PERIPHERAL BLOOD SMEAR: ICD-10-CM

## 2023-10-10 LAB
BASOPHILS # BLD AUTO: 0.01 THOUSANDS/ÂΜL (ref 0–0.1)
BASOPHILS NFR BLD AUTO: 0 % (ref 0–1)
CRP SERPL QL: <1 MG/L
EOSINOPHIL # BLD AUTO: 0.09 THOUSAND/ÂΜL (ref 0–0.61)
EOSINOPHIL NFR BLD AUTO: 2 % (ref 0–6)
ERYTHROCYTE [DISTWIDTH] IN BLOOD BY AUTOMATED COUNT: 12.1 % (ref 11.6–15.1)
ERYTHROCYTE [SEDIMENTATION RATE] IN BLOOD: 2 MM/HOUR (ref 0–14)
HBV CORE AB SER QL: NORMAL
HBV SURFACE AB SER-ACNC: <3 MIU/ML
HBV SURFACE AG SER QL: NORMAL
HCT VFR BLD AUTO: 47.1 % (ref 36.5–49.3)
HCV AB SER QL: NORMAL
HGB BLD-MCNC: 15.6 G/DL (ref 12–17)
IMM GRANULOCYTES # BLD AUTO: 0.01 THOUSAND/UL (ref 0–0.2)
IMM GRANULOCYTES NFR BLD AUTO: 0 % (ref 0–2)
LYMPHOCYTES # BLD AUTO: 1.31 THOUSANDS/ÂΜL (ref 0.6–4.47)
LYMPHOCYTES NFR BLD AUTO: 27 % (ref 14–44)
MCH RBC QN AUTO: 30.1 PG (ref 26.8–34.3)
MCHC RBC AUTO-ENTMCNC: 33.1 G/DL (ref 31.4–37.4)
MCV RBC AUTO: 91 FL (ref 82–98)
MONOCYTES # BLD AUTO: 0.38 THOUSAND/ÂΜL (ref 0.17–1.22)
MONOCYTES NFR BLD AUTO: 8 % (ref 4–12)
NEUTROPHILS # BLD AUTO: 3.12 THOUSANDS/ÂΜL (ref 1.85–7.62)
NEUTS SEG NFR BLD AUTO: 63 % (ref 43–75)
NRBC BLD AUTO-RTO: 0 /100 WBCS
PLATELET # BLD AUTO: 206 THOUSANDS/UL (ref 149–390)
PMV BLD AUTO: 10.6 FL (ref 8.9–12.7)
RBC # BLD AUTO: 5.19 MILLION/UL (ref 3.88–5.62)
WBC # BLD AUTO: 4.92 THOUSAND/UL (ref 4.31–10.16)

## 2023-10-10 PROCEDURE — 85025 COMPLETE CBC W/AUTO DIFF WBC: CPT

## 2023-10-10 PROCEDURE — 86706 HEP B SURFACE ANTIBODY: CPT

## 2023-10-10 PROCEDURE — 86140 C-REACTIVE PROTEIN: CPT

## 2023-10-10 PROCEDURE — 86704 HEP B CORE ANTIBODY TOTAL: CPT

## 2023-10-10 PROCEDURE — 86430 RHEUMATOID FACTOR TEST QUAL: CPT

## 2023-10-10 PROCEDURE — 86803 HEPATITIS C AB TEST: CPT

## 2023-10-10 PROCEDURE — 86038 ANTINUCLEAR ANTIBODIES: CPT

## 2023-10-10 PROCEDURE — 88184 FLOWCYTOMETRY/ TC 1 MARKER: CPT

## 2023-10-10 PROCEDURE — 85652 RBC SED RATE AUTOMATED: CPT

## 2023-10-10 PROCEDURE — 88185 FLOWCYTOMETRY/TC ADD-ON: CPT

## 2023-10-10 PROCEDURE — 36415 COLL VENOUS BLD VENIPUNCTURE: CPT

## 2023-10-10 PROCEDURE — 87340 HEPATITIS B SURFACE AG IA: CPT

## 2023-10-11 LAB
RHEUMATOID FACT SER QL LA: NEGATIVE
SCAN RESULT: NORMAL

## 2023-10-12 LAB — ANA TITR SER IF: NEGATIVE {TITER}

## 2023-12-19 DIAGNOSIS — D72.819 LEUKOPENIA, UNSPECIFIED TYPE: Primary | ICD-10-CM

## 2023-12-20 ENCOUNTER — APPOINTMENT (OUTPATIENT)
Dept: LAB | Facility: HOSPITAL | Age: 48
End: 2023-12-20
Payer: COMMERCIAL

## 2023-12-20 DIAGNOSIS — D72.819 LEUKOPENIA, UNSPECIFIED TYPE: ICD-10-CM

## 2023-12-20 LAB
BASOPHILS # BLD AUTO: 0.01 THOUSANDS/ÂΜL (ref 0–0.1)
BASOPHILS NFR BLD AUTO: 0 % (ref 0–1)
EOSINOPHIL # BLD AUTO: 0.09 THOUSAND/ÂΜL (ref 0–0.61)
EOSINOPHIL NFR BLD AUTO: 2 % (ref 0–6)
ERYTHROCYTE [DISTWIDTH] IN BLOOD BY AUTOMATED COUNT: 12.1 % (ref 11.6–15.1)
HCT VFR BLD AUTO: 46.3 % (ref 36.5–49.3)
HGB BLD-MCNC: 15.4 G/DL (ref 12–17)
IMM GRANULOCYTES # BLD AUTO: 0.01 THOUSAND/UL (ref 0–0.2)
IMM GRANULOCYTES NFR BLD AUTO: 0 % (ref 0–2)
LYMPHOCYTES # BLD AUTO: 1.42 THOUSANDS/ÂΜL (ref 0.6–4.47)
LYMPHOCYTES NFR BLD AUTO: 33 % (ref 14–44)
MCH RBC QN AUTO: 29.7 PG (ref 26.8–34.3)
MCHC RBC AUTO-ENTMCNC: 33.3 G/DL (ref 31.4–37.4)
MCV RBC AUTO: 89 FL (ref 82–98)
MONOCYTES # BLD AUTO: 0.36 THOUSAND/ÂΜL (ref 0.17–1.22)
MONOCYTES NFR BLD AUTO: 8 % (ref 4–12)
NEUTROPHILS # BLD AUTO: 2.39 THOUSANDS/ÂΜL (ref 1.85–7.62)
NEUTS SEG NFR BLD AUTO: 57 % (ref 43–75)
NRBC BLD AUTO-RTO: 0 /100 WBCS
PLATELET # BLD AUTO: 208 THOUSANDS/UL (ref 149–390)
PMV BLD AUTO: 10.4 FL (ref 8.9–12.7)
RBC # BLD AUTO: 5.19 MILLION/UL (ref 3.88–5.62)
WBC # BLD AUTO: 4.28 THOUSAND/UL (ref 4.31–10.16)

## 2023-12-20 PROCEDURE — 36415 COLL VENOUS BLD VENIPUNCTURE: CPT

## 2023-12-20 PROCEDURE — 85025 COMPLETE CBC W/AUTO DIFF WBC: CPT

## 2024-01-03 ENCOUNTER — TELEPHONE (OUTPATIENT)
Dept: HEMATOLOGY ONCOLOGY | Facility: CLINIC | Age: 49
End: 2024-01-03

## 2024-01-03 NOTE — TELEPHONE ENCOUNTER
Spoke to patient to have him complete the labs prior to Dr Alvarez's appointment.  He states he went on 12/20/23.  I do see that but the lab failed to complete the cmp.  Patient aware and he will go this week to complete.

## 2024-01-05 ENCOUNTER — APPOINTMENT (OUTPATIENT)
Dept: LAB | Facility: HOSPITAL | Age: 49
End: 2024-01-05
Payer: COMMERCIAL

## 2024-01-05 DIAGNOSIS — D72.89 ATYPICAL LYMPHOCYTES PRESENT ON PERIPHERAL BLOOD SMEAR: ICD-10-CM

## 2024-01-05 LAB
ALBUMIN SERPL BCP-MCNC: 4.9 G/DL (ref 3.5–5)
ALP SERPL-CCNC: 38 U/L (ref 34–104)
ALT SERPL W P-5'-P-CCNC: 20 U/L (ref 7–52)
ANION GAP SERPL CALCULATED.3IONS-SCNC: 4 MMOL/L
AST SERPL W P-5'-P-CCNC: 15 U/L (ref 13–39)
BILIRUB SERPL-MCNC: 1.25 MG/DL (ref 0.2–1)
BUN SERPL-MCNC: 15 MG/DL (ref 5–25)
CALCIUM SERPL-MCNC: 10.1 MG/DL (ref 8.4–10.2)
CHLORIDE SERPL-SCNC: 102 MMOL/L (ref 96–108)
CO2 SERPL-SCNC: 31 MMOL/L (ref 21–32)
CREAT SERPL-MCNC: 1.1 MG/DL (ref 0.6–1.3)
GFR SERPL CREATININE-BSD FRML MDRD: 80 ML/MIN/1.73SQ M
GLUCOSE P FAST SERPL-MCNC: 101 MG/DL (ref 65–99)
POTASSIUM SERPL-SCNC: 4.1 MMOL/L (ref 3.5–5.3)
PROT SERPL-MCNC: 7.3 G/DL (ref 6.4–8.4)
SODIUM SERPL-SCNC: 137 MMOL/L (ref 135–147)

## 2024-01-05 PROCEDURE — 80053 COMPREHEN METABOLIC PANEL: CPT

## 2024-01-05 PROCEDURE — 36415 COLL VENOUS BLD VENIPUNCTURE: CPT

## 2024-01-09 ENCOUNTER — OFFICE VISIT (OUTPATIENT)
Dept: HEMATOLOGY ONCOLOGY | Facility: CLINIC | Age: 49
End: 2024-01-09
Payer: COMMERCIAL

## 2024-01-09 VITALS
OXYGEN SATURATION: 98 % | HEIGHT: 71 IN | TEMPERATURE: 99 F | HEART RATE: 71 BPM | WEIGHT: 182 LBS | BODY MASS INDEX: 25.48 KG/M2 | DIASTOLIC BLOOD PRESSURE: 70 MMHG | SYSTOLIC BLOOD PRESSURE: 110 MMHG

## 2024-01-09 DIAGNOSIS — D72.819 LEUKOPENIA, UNSPECIFIED TYPE: Primary | ICD-10-CM

## 2024-01-09 PROCEDURE — 99215 OFFICE O/P EST HI 40 MIN: CPT | Performed by: INTERNAL MEDICINE

## 2024-01-09 NOTE — PROGRESS NOTES
[facility]  [address] [facility] [address]  [phone]  [phone]    Wali Newman,1975, [id number]  01/09/24    Discussion:   In summary, this is a 48-year-old male with a history of leukopenia and abnormal lymphocytes.  Leukopenia dates back to June 2023.  He had had some malaise, fatigue over a few months in the fall and early winter 2023.  That has subsequently resolved, coincident with new eyeglass prescription.  Recent WBC 4.2, hemoglobin 15.4, platelet count 208, normal differential.  CMP shows bilirubin 1.25, otherwise normal.  Hepatitis virus testing, JAYESH, rheumatoid factor, sed rate, CRP all normal.  Reviewing prior blood work shows a white count of 8.7 in 1999 and white count of 5.9 in 2015.  Normal hemoglobin, platelets, differential on both occasions.  Difficult to know whether the 8.7 represented his baseline at that time or was higher than usual prompting blood work on that occasion.  He does not recall his clinical status, understandably.  In any event, I would suggest observation at this point.  Repeat CBC in 3 months and again just prior to his next visit in 6 months.    I discussed the above with the patient.  The patient  voiced understanding and agreement.  ______________________________________________________________________    No chief complaint on file.      HPI:  Oncology History    No history exists.       Interval History: Clinically stable.  ECOG-  0 - Asymptomatic    Review of Systems   Constitutional:  Negative for chills and fever.   HENT:  Negative for nosebleeds.    Eyes:  Negative for discharge.   Respiratory:  Negative for cough and shortness of breath.    Cardiovascular:  Negative for chest pain.   Gastrointestinal:  Negative for abdominal pain, constipation and diarrhea.   Endocrine: Negative for polydipsia.   Genitourinary:  Negative for hematuria.   Musculoskeletal:  Negative for arthralgias.   Skin:  Negative for color change.  "  Allergic/Immunologic: Negative for immunocompromised state.   Neurological:  Negative for dizziness and headaches.   Hematological:  Negative for adenopathy.   Psychiatric/Behavioral:  Negative for agitation.        Past Medical History:   Diagnosis Date    Known health problems: none     Rupture of right distal biceps tendon 12/16/2021     Patient Active Problem List   Diagnosis    Tear of right supraspinatus tendon    Other hemorrhoids    Atypical lymphocytes present on peripheral blood smear    Red blood cell morphology abnormality    Leukopenia       Current Outpatient Medications:     hydrocortisone 1 % cream, Apply topically 2 (two) times a day as needed for irritation (hemorrhoids), Disp: 30 g, Rfl: 1  No Known Allergies  Past Surgical History:   Procedure Laterality Date    ESOPHAGOGASTRODUODENOSCOPY      LASIK      [place] RINSJ RPTD BICEPS/TRICEPS TDN DSTL W/WO TDN GRF Right 12/21/2021    Procedure: DISTAL BICEPS TENDON REPAIR;  Surgeon: Jatinder Jamil MD;  [facility];  Service: Orthopedics     Social History     Objective:  Vitals:    01/09/24 0827   BP: 110/70   BP Location: Left arm   Patient Position: Sitting   Cuff Size: Standard   Pulse: 71   Temp: 99 °F (37.2 °C)   SpO2: 98%   Weight: 82.6 kg (182 lb)   Height: 5' 11\" (1.803 m)     Physical Exam  Constitutional:       Appearance: He is well-developed.   HENT:      Head: Normocephalic and atraumatic.      Mouth/Throat:      Mouth: Mucous membranes are moist.   Eyes:      Pupils: Pupils are equal, round, and reactive to light.   Cardiovascular:      Rate and Rhythm: Normal rate and regular rhythm.      Heart sounds: No murmur heard.  Pulmonary:      Breath sounds: Normal breath sounds. No wheezing or rales.   Abdominal:      Palpations: Abdomen is soft.      Tenderness: There is no abdominal tenderness.   Musculoskeletal:         General: No tenderness. Normal range of motion.      Cervical back: Neck supple.   Lymphadenopathy:      " Cervical: No cervical adenopathy.   Skin:     Findings: No erythema or rash.   Neurological:      Mental Status: He is alert and oriented to person, place, and time.      Cranial Nerves: No cranial nerve deficit.      Deep Tendon Reflexes: Reflexes are normal and symmetric.   Psychiatric:         Behavior: Behavior normal.         Labs:  I personally reviewed the labs and imaging pertinent to this patient care.

## 2024-01-09 NOTE — LETTER
January 9, 2024     Drake Reed DO  [address] [facility] [address]    Patient: Wali Newman   YOB: 1975   Date of Visit: 1/9/2024       Dear Dr. Reed:    Thank you for referring Wali Newman to me for evaluation. Below are my notes for this consultation.    If you have questions, please do not hesitate to call me. I look forward to following your patient along with you.         Sincerely,        Juan Manuel Alvarez DO        CC: No Recipients    Jua nManuel Alvarez DO  1/9/2024  8:59 AM  Sign when Signing Visit  [facility] [place]  [address] [facility] [id number] [phone]    Wali Newman,1975, [id number]  01/09/24    Discussion:   In summary, this is a 48-year-old male with a history of leukopenia and abnormal lymphocytes.  Leukopenia dates back to June 2023.  He had had some malaise, fatigue over a few months in the fall and early winter 2023.  That has subsequently resolved, coincident with new eyeglass prescription.  Recent WBC 4.2, hemoglobin 15.4, platelet count 208, normal differential.  CMP shows bilirubin 1.25, otherwise normal.  Hepatitis virus testing, JAYESH, rheumatoid factor, sed rate, CRP all normal.  Reviewing prior blood work shows a white count of 8.7 in 1999 and white count of 5.9 in 2015.  Normal hemoglobin, platelets, differential on both occasions.  Difficult to know whether the 8.7 represented his baseline at that time or was higher than usual prompting blood work on that occasion.  He does not recall his clinical status, understandably.  In any event, I would suggest observation at this point.  Repeat CBC in 3 months and again just prior to his next visit in 6 months.    I discussed the above with the patient.  The patient  voiced understanding and agreement.  ______________________________________________________________________    No chief complaint on file.      HPI:  Oncology History    No history exists.       Interval  "History: Clinically stable.  ECOG-  0 - Asymptomatic    Review of Systems   Constitutional:  Negative for chills and fever.   HENT:  Negative for nosebleeds.    Eyes:  Negative for discharge.   Respiratory:  Negative for cough and shortness of breath.    Cardiovascular:  Negative for chest pain.   Gastrointestinal:  Negative for abdominal pain, constipation and diarrhea.   Endocrine: Negative for polydipsia.   Genitourinary:  Negative for hematuria.   Musculoskeletal:  Negative for arthralgias.   Skin:  Negative for color change.   Allergic/Immunologic: Negative for immunocompromised state.   Neurological:  Negative for dizziness and headaches.   Hematological:  Negative for adenopathy.   Psychiatric/Behavioral:  Negative for agitation.        Past Medical History:   Diagnosis Date   • Known health problems: none    • Rupture of right distal biceps tendon 12/16/2021     Patient Active Problem List   Diagnosis   • Tear of right supraspinatus tendon   • Other hemorrhoids   • Atypical lymphocytes present on peripheral blood smear   • Red blood cell morphology abnormality   • Leukopenia       Current Outpatient Medications:   •  hydrocortisone 1 % cream, Apply topically 2 (two) times a day as needed for irritation (hemorrhoids), Disp: 30 g, Rfl: 1  No Known Allergies  Past Surgical History:   Procedure Laterality Date   • ESOPHAGOGASTRODUODENOSCOPY     • LASIK     • [place] RINSJ RPTD BICEPS/TRICEPS TDN DSTL W/WO TDN GRF Right 12/21/2021    Procedure: DISTAL BICEPS TENDON REPAIR;  Surgeon: Jatinder Jamil MD;  [facility]: [facility];  Service: Orthopedics     Social History     Objective:  Vitals:    01/09/24 0827   BP: 110/70   BP Location: Left arm   Patient Position: Sitting   Cuff Size: Standard   Pulse: 71   Temp: 99 °F (37.2 °C)   SpO2: 98%   Weight: 82.6 kg (182 lb)   Height: 5' 11\" (1.803 m)     Physical Exam  Constitutional:       Appearance: He is well-developed.   HENT:      Head: Normocephalic and " atraumatic.      Mouth/Throat:      Mouth: Mucous membranes are moist.   Eyes:      Pupils: Pupils are equal, round, and reactive to light.   Cardiovascular:      Rate and Rhythm: Normal rate and regular rhythm.      Heart sounds: No murmur heard.  Pulmonary:      Breath sounds: Normal breath sounds. No wheezing or rales.   Abdominal:      Palpations: Abdomen is soft.      Tenderness: There is no abdominal tenderness.   Musculoskeletal:         General: No tenderness. Normal range of motion.      Cervical back: Neck supple.   Lymphadenopathy:      Cervical: No cervical adenopathy.   Skin:     Findings: No erythema or rash.   Neurological:      Mental Status: He is alert and oriented to person, place, and time.      Cranial Nerves: No cranial nerve deficit.      Deep Tendon Reflexes: Reflexes are normal and symmetric.   Psychiatric:         Behavior: Behavior normal.         Labs:  I personally reviewed the labs and imaging pertinent to this patient care.

## 2024-02-26 DIAGNOSIS — K64.8 OTHER HEMORRHOIDS: ICD-10-CM

## 2024-02-27 RX ORDER — BENZOCAINE/MENTHOL 6 MG-10 MG
LOZENGE MUCOUS MEMBRANE 2 TIMES DAILY PRN
Qty: 30 G | Refills: 1 | Status: SHIPPED | OUTPATIENT
Start: 2024-02-27

## 2024-03-27 DIAGNOSIS — Z00.6 ENCOUNTER FOR EXAMINATION FOR NORMAL COMPARISON OR CONTROL IN CLINICAL RESEARCH PROGRAM: ICD-10-CM

## 2024-04-02 ENCOUNTER — APPOINTMENT (OUTPATIENT)
Dept: LAB | Facility: HOSPITAL | Age: 49
End: 2024-04-02

## 2024-04-02 DIAGNOSIS — Z00.6 ENCOUNTER FOR EXAMINATION FOR NORMAL COMPARISON OR CONTROL IN CLINICAL RESEARCH PROGRAM: ICD-10-CM

## 2024-04-02 PROCEDURE — 36415 COLL VENOUS BLD VENIPUNCTURE: CPT

## 2024-04-21 LAB
APOB+LDLR+PCSK9 GENE MUT ANL BLD/T: NOT DETECTED
BRCA1+BRCA2 DEL+DUP + FULL MUT ANL BLD/T: NOT DETECTED
MLH1+MSH2+MSH6+PMS2 GN DEL+DUP+FUL M: NOT DETECTED

## 2024-05-23 PROBLEM — M75.101 TEAR OF RIGHT SUPRASPINATUS TENDON: Status: RESOLVED | Noted: 2021-05-24 | Resolved: 2024-05-23

## 2024-05-30 ENCOUNTER — TELEPHONE (OUTPATIENT)
Dept: FAMILY MEDICINE CLINIC | Facility: CLINIC | Age: 49
End: 2024-05-30

## 2024-05-30 NOTE — TELEPHONE ENCOUNTER
5/30 LM FOR PT TO CALL BACK TO RESCHEDULE APPT FOR PHYSICAL FROM 6/17 WITH ANOTHER    PROVIDER OUT.

## 2024-06-17 ENCOUNTER — APPOINTMENT (OUTPATIENT)
Dept: LAB | Facility: HOSPITAL | Age: 49
End: 2024-06-17
Payer: COMMERCIAL

## 2024-07-19 ENCOUNTER — OFFICE VISIT (OUTPATIENT)
Dept: FAMILY MEDICINE CLINIC | Facility: CLINIC | Age: 49
End: 2024-07-19
Payer: COMMERCIAL

## 2024-07-19 VITALS
TEMPERATURE: 98.4 F | WEIGHT: 184 LBS | HEIGHT: 71 IN | OXYGEN SATURATION: 98 % | HEART RATE: 97 BPM | SYSTOLIC BLOOD PRESSURE: 126 MMHG | DIASTOLIC BLOOD PRESSURE: 70 MMHG | BODY MASS INDEX: 25.76 KG/M2

## 2024-07-19 DIAGNOSIS — Z00.00 ANNUAL PHYSICAL EXAM: Primary | ICD-10-CM

## 2024-07-19 DIAGNOSIS — K62.5 RECTAL HEMORRHAGE: ICD-10-CM

## 2024-07-19 DIAGNOSIS — G89.29 CHRONIC MIDLINE LOW BACK PAIN, UNSPECIFIED WHETHER SCIATICA PRESENT: ICD-10-CM

## 2024-07-19 DIAGNOSIS — M54.50 CHRONIC MIDLINE LOW BACK PAIN, UNSPECIFIED WHETHER SCIATICA PRESENT: ICD-10-CM

## 2024-07-19 DIAGNOSIS — Z80.0 FAMILY HISTORY OF COLON CANCER: ICD-10-CM

## 2024-07-19 PROCEDURE — 99396 PREV VISIT EST AGE 40-64: CPT | Performed by: FAMILY MEDICINE

## 2024-07-19 NOTE — PATIENT INSTRUCTIONS
"Patient Education     Routine physical for adults   The Basics   Written by the doctors and editors at [facility]   What is a physical? -- A physical is a routine visit, or \"check-up,\" with your doctor. You might also hear it called a \"wellness visit\" or \"preventive visit.\"  During each visit, the doctor will:   Ask about your physical and mental health   Ask about your habits, behaviors, and lifestyle   Do an exam   Give you vaccines if needed   Talk to you about any medicines you take   Give advice about your health   Answer your questions  Getting regular check-ups is an important part of taking care of your health. It can help your doctor find and treat any problems you have. But it's also important for preventing health problems.  A routine physical is different from a \"sick visit.\" A sick visit is when you see a doctor because of a health concern or problem. Since physicals are scheduled ahead of time, you can think about what you want to ask the doctor.  How often should I get a physical? -- It depends on your age and health. In general, for people age 21 years and older:   If you are younger than 50 years, you might be able to get a physical every 3 years.   If you are 50 years or older, your doctor might recommend a physical every year.  If you have an ongoing health condition, like diabetes or high blood pressure, your doctor will probably want to see you more often.  What happens during a physical? -- In general, each visit will include:   Physical exam - The doctor or nurse will check your height, weight, heart rate, and blood pressure. They will also look at your eyes and ears. They will ask about how you are feeling and whether you have any symptoms that bother you.   Medicines - It's a good idea to bring a list of all the medicines you take to each doctor visit. Your doctor will talk to you about your medicines and answer any questions. Tell them if you are having any side effects that bother you. You " "should also tell them if you are having trouble paying for any of your medicines.   Habits and behaviors - This includes:   Your diet   Your exercise habits   Whether you smoke, drink alcohol, or use drugs   Whether you are sexually active   Whether you feel safe at home  Your doctor will talk to you about things you can do to improve your health and lower your risk of health problems. They will also offer help and support. For example, if you want to quit smoking, they can give you advice and might prescribe medicines. If you want to improve your diet or get more physical activity, they can help you with this, too.   Lab tests, if needed - The tests you get will depend on your age and situation. For example, your doctor might want to check your:   Cholesterol   Blood sugar   Iron level   Vaccines - The recommended vaccines will depend on your age, health, and what vaccines you already had. Vaccines are very important because they can prevent certain serious or deadly infections.   Discussion of screening - \"Screening\" means checking for diseases or other health problems before they cause symptoms. Your doctor can recommend screening based on your age, risk, and preferences. This might include tests to check for:   Cancer, such as breast, prostate, cervical, ovarian, colorectal, prostate, lung, or skin cancer   Sexually transmitted infections, such as chlamydia and gonorrhea   Mental health conditions like depression and anxiety  Your doctor will talk to you about the different types of screening tests. They can help you decide which screenings to have. They can also explain what the results might mean.   Answering questions - The physical is a good time to ask the doctor or nurse questions about your health. If needed, they can refer you to other doctors or specialists, too.  Adults older than 65 years often need other care, too. As you get older, your doctor will talk to you about:   How to prevent falling at " home   Hearing or vision tests   Memory testing   How to take your medicines safely   Making sure that you have the help and support you need at home  All topics are updated as new evidence becomes available and our peer review process is complete.  This topic retrieved from [employer] on: May 02, 2024.  Topic 031285 Version 1.0  Release: 32.4.3 - C32.122  © 2024 UpToDate, Inc. and/or its affiliates. All rights reserved.  Consumer Information Use and Disclaimer   Disclaimer: This generalized information is a limited summary of diagnosis, treatment, and/or medication information. It is not meant to be comprehensive and should be used as a tool to help the user understand and/or assess potential diagnostic and treatment options. It does NOT include all information about conditions, treatments, medications, side effects, or risks that may apply to a specific patient. It is not intended to be medical advice or a substitute for the medical advice, diagnosis, or treatment of a health care provider based on the health care provider's examination and assessment of a patient's specific and unique circumstances. Patients must speak with a health care provider for complete information about their health, medical questions, and treatment options, including any risks or benefits regarding use of medications. This information does not endorse any treatments or medications as safe, effective, or approved for treating a specific patient. UpToDate, Inc. and its affiliates disclaim any warranty or liability relating to this information or the use thereof.The use of this information is governed by the Terms of Use, available at https://www.woltersGranDatauwer.com/en/know/clinical-effectiveness-terms. 2024© UpToDate, Inc. and its affiliates and/or licensors. All rights reserved.  Copyright   © 2024 UpToDate, Inc. and/or its affiliates. All rights reserved.    Patient Education     Exercise and movement   The Basics   Written by the doctors and  editors at [employer]   What are the benefits of movement? -- Moving your body has many benefits. It can:   Burn calories, which helps people manage their weight   Help control blood sugar levels in people with diabetes   Lower blood pressure, especially in people with high blood pressure   Lower stress, and help with depression and anxiety   Keep bones strong, so they don't get thin and break easily   Lower the chance of dying from heart disease  Adding even small amounts of physical activity to your daily routine can improve your health.  What are the main types of exercise? -- There are 3 main types of exercise:   Aerobic exercise - This raises your heart rate. Examples include walking, running, dancing, riding a bike, and swimming.   Muscle strengthening - This helps make your muscles stronger. You can do it using weights, exercise bands, or weight machines. You can also use your own body weight, as with push-ups, or by lifting items in your home, like jugs of water.   Stretching - These help your muscles and joints move more easily.  It's important to have all 3 types in your exercise program. That way, your body, muscles, and joints can be as healthy as possible.  Should I talk to my doctor or nurse before I start exercising? -- If you have not exercised before or have not exercised in a long time, talk with your doctor or nurse before you start a very active exercise program.  If you have heart disease or risk factors for heart disease (like high blood pressure or diabetes), your doctor or nurse might recommend that you have an exercise test before starting an exercise program.  When you begin an exercise program, start slowly. For example, do the exercise at a slow pace or for a few minutes only. Over time, you can exercise faster and for longer periods of time.  What should I do when I exercise? -- Each time you exercise, you should:   Warm up - This can help keep you from hurting your muscles when you  exercise. To warm up, do a light aerobic exercise (such as walking slowly) or stretch for 5 to 10 minutes.   Work out - Try to get a mix of aerobic exercise, muscle strengthening, and stretching. During an aerobic workout, you can walk fast, swim, run, or use an exercise machine, for example. Other activities, like dancing or playing tennis, are also forms of aerobic exercise. You should also take time to stretch all of your joints, including your neck, shoulders, back, hips, and knees. At least 2 times a week, you can do muscle strengthening exercises as part of your workout.   Cool down - This helps keep you from feeling dizzy after you exercise and helps prevent muscle cramps. To cool down, you can stretch or do a light aerobic exercise for 5 minutes.  Some people go to a gym or do group exercise classes. But you can exercise even without these things. Some exercises can be done even in a small space. You can also try online videos or smartphone apps to get ideas for different types of exercise.  How often should I exercise? -- Doctors recommend that people exercise at least 30 minutes a day, on 5 or more days of the week.  If you can't exercise for 30 minutes straight, try to exercise for 10 minutes at a time, 3 or 4 times a day. Even exercising for shorter amounts of time is good for you, especially if it means spending less time sitting.  When should I call my doctor or nurse? -- If you have any of the following symptoms when you exercise, stop exercising and call your doctor or nurse right away:   Pain or pressure in your chest, arms, throat, jaw, or back   Nausea or vomiting   Feeling like your heart is fluttering or racing very fast   Feeling dizzy or faint  What if I don't have time to exercise? -- Many people have very busy lives and might not think that they have time to exercise. But it's important to try to find time, even if you are tired or work a lot. Exercise can increase your energy level, which  can make you feel better and might even help you get more work done.  Even if it's hard to set aside a lot of time to exercise, you can still improve your health by moving your body more. There are many ways that you can be more active. For example, you can:   Take the stairs instead of the elevator.   Park in a parking space that is farther away from the door.   Take a longer route when you walk from one place to another.  Spending a lot of time sitting still (for example, watching TV or working on the computer) is bad for your health. Try to get up and move around whenever you can. Even small amounts of movement, like taking short walks, doing household chores, or gardening, can improve your health. Finding activities that you enjoy, or doing them with other people, can help you add more movement into your daily life.  What else should I do when I exercise? -- To exercise safely and avoid problems, it's important to:   Drink fluids during and after exercising (but avoid drinks with a lot of caffeine or sugar).   Avoid exercising outside if it is too hot or cold.   Wear layers of clothes, so that you can take them off if you get too hot.   Wear shoes that fit well and support your feet.   Be aware of your surroundings if you exercise outside.  All topics are updated as new evidence becomes available and our peer review process is complete.  This topic retrieved from [employer] on: May 18, 2024.  Topic 36212 Version 31.0  Release: 32.4.3 - C32.137  © 2024 UpToDate, Inc. and/or its affiliates. All rights reserved.  Consumer Information Use and Disclaimer   Disclaimer: This generalized information is a limited summary of diagnosis, treatment, and/or medication information. It is not meant to be comprehensive and should be used as a tool to help the user understand and/or assess potential diagnostic and treatment options. It does NOT include all information about conditions, treatments, medications, side effects, or  "risks that may apply to a specific patient. It is not intended to be medical advice or a substitute for the medical advice, diagnosis, or treatment of a health care provider based on the health care provider's examination and assessment of a patient's specific and unique circumstances. Patients must speak with a health care provider for complete information about their health, medical questions, and treatment options, including any risks or benefits regarding use of medications. This information does not endorse any treatments or medications as safe, effective, or approved for treating a specific patient. UpToDate, Inc. and its affiliates disclaim any warranty or liability relating to this information or the use thereof.The use of this information is governed by the Terms of Use, available at https://www.[employer].com/en/know/clinical-effectiveness-terms. 2024© UpToDate, Inc. and its affiliates and/or licensors. All rights reserved.  Copyright   © 2024 Up[employer]Date, Inc. and/or its affiliates. All rights reserved.    Patient Education     Low-fat diet   The Basics   Written by the doctors and editors at [id number]   What are fats? -- The fat in the food you eat is often classified into 2 groups:   \"Healthy\" fats - These are monounsaturated or polyunsaturated fats. They tend to be more liquid at room temperature. Healthy fats are found in things like olive oil, canola oil, and sesame oil. They are also found in nuts, seeds, avocados, and nut butters.   \"Unhealthy\" fats - These are saturated and trans fats. Saturated fats are animal fats. Trans fats are artificial fats, like partially hydrogenated oils. These fats raise your cholesterol. Unhealthy fats tend to be more solid at room temperature. They are found in meats, egg yolks, butter, cheese, and full-fat milk products. They are also found in some fried foods, butter, margarine, and baked goods like cookies or cakes.  Why do I need a low-fat diet? -- The amounts and " kinds of fats you eat can affect your health. This is especially true if you have specific conditions such as blocked arteries or gallbladder problems. Eating fewer unhealthy fats is 1 way to improve your health.  Some people try to eat less fat because they want to lose weight or avoid gaining weight. But this does not always work. That's because weight gain is related to how many calories you eat, no matter what foods they come from. Eating fewer unhealthy fats can be an important part of a weight loss plan. But it's also important to be aware of how many calories you are getting from other foods.  What can I eat and drink on a low-fat diet? -- Choose foods with healthy fats.  Foods with healthy fats include:   Canola, peanut, and olive oil   Safflower, sunflower, soybean, and corn oil   Walnuts, almonds, pecans, hazelnuts, cashews, and peanuts   Pumpkin, sesame, flax, and sunflower seeds   [place], tuna, and some other fish   Tofu   Soy milk   Avocado  Other healthy foods with lower amounts of fats include:   Fat-free (non-fat) or low-fat milk, yogurt, and cheese   Light, low-fat or fat-free cream cheese and sour cream   Dried beans, lentils, and tofu   Fruits and vegetables   Whole-grain breads, cereals, pastas, and rice   High-fiber foods, like oatmeal, fruits, beans, and nuts. These have soluble fiber, which helps lower cholesterol in the body.   Deli ham, turkey, chicken breast, and lean roast beef  What foods and drinks should I limit on a low-fat diet? -- It is important to limit certain foods with unhealthy fats.  Limit these types of foods that have saturated fats:   Whole-fat dairy products like cheese, ice cream, whole milk, and cream   High-fat meats like beef, lamb, poultry with the skin, mcintyre, hot dogs, and sausage   Processed deli meats like bologna, pepperoni, and salami   Butter and lard   Palm and coconut oils   Mayonnaise, salad dressings, gravies, and sauces  Limit these types of foods that  "might have trans fats:   Granola, candy, and baked goods like cookies, cakes, doughnuts, and muffins   Pizza dough, and pie crusts that are packaged   Fried foods   Frozen dinners   Chips and crackers   Microwave popcorn   Stick margarine and vegetable shortenings  What else should I know? -- You do not have to remove all fat from your diet. Instead, pay attention to the amount and kinds of fats that you eat.   Read the labels of store-bought foods (figure 1) to find out how much fat is in each. Under 5 percent of total fat on a label means that it is \"low fat.\" Over 20 percent of total fat on a label means that it is \"high fat.\" Avoid foods with \"partially hydrogenated oil\" in the ingredient list. This means that there is trans fat in the food.   Change how you cook to help lower the amount of fat in your food:   Remove the fatty parts of meat and the skin from poultry before cooking   Bake, broil, grill, poach, or roast poultry, fish, and lean meats   Drain and throw away the fat that comes out of meat as you cook it   Try to add little or no fat to foods   Use olive or canola oil for cooking or baking   Steam your vegetables   Use herbs or no-oil marinades to flavor foods  All topics are updated as new evidence becomes available and our peer review process is complete.  This topic retrieved from [employer] on: Mar 13, 2024.  Topic 444363 Version 4.0  Release: 32.2.4 - C32.71  © 2024 UpToDate, Inc. and/or its affiliates. All rights reserved.  figure 1: Nutrition label - Fats     This is an example of a nutrition label. To figure out how much and what kinds of fats are in a food, look for the lines that say \"Saturated Fat\" and \"Trans Fat.\" It's also important to look at the serving size. This food has 3 grams of saturated fat and 2 grams of trans fat in each serving, and each serving is 2 tablespoons (32 grams).  Graphic 551667 Version 1.0  Consumer Information Use and Disclaimer   Disclaimer: This generalized " information is a limited summary of diagnosis, treatment, and/or medication information. It is not meant to be comprehensive and should be used as a tool to help the user understand and/or assess potential diagnostic and treatment options. It does NOT include all information about conditions, treatments, medications, side effects, or risks that may apply to a specific patient. It is not intended to be medical advice or a substitute for the medical advice, diagnosis, or treatment of a health care provider based on the health care provider's examination and assessment of a patient's specific and unique circumstances. Patients must speak with a health care provider for complete information about their health, medical questions, and treatment options, including any risks or benefits regarding use of medications. This information does not endorse any treatments or medications as safe, effective, or approved for treating a specific patient. UpToDate, Inc. and its affiliates disclaim any warranty or liability relating to this information or the use thereof.The use of this information is governed by the Terms of Use, available at https://www.[employer].com/en/know/clinical-effectiveness-terms. 2024© UpToDate, Inc. and its affiliates and/or licensors. All rights reserved.  Copyright   © 2024 UpToDate, Inc. and/or its affiliates. All rights reserved.    Patient Education     Mediterranean diet   The Basics   Written by the doctors and editors at [employer]   What is a Mediterranean diet? -- A Mediterranean diet is a heart-healthy way of eating. It includes foods and cooking styles from many countries around the [place], like [place] and [place]. The exact foods included vary from place to place.  A Mediterranean diet involves eating a lot of fruits, vegetables, nuts, and whole grains. It uses olive oil instead of other fats. It also includes some fish, poultry, and dairy products, but not a lot of red meat.  Wine is  often thought of as part of a Mediterranean diet. It is not needed, and you might choose not to include it. If you do drink alcohol, limit the amount to:   For females, no more than 1 drink a day   For males, no more than 2 drinks a day  What are the benefits of a Mediterranean diet? -- A Mediterranean diet can help you:   Improve your overall health, and help you lose weight   Lower your risk of stroke   Lower your risk of heart problems such as a heart attack   Manage your blood sugar if you have diabetes  What can I eat and drink on a Mediterranean diet? -- A Mediterranean diet is more of an eating pattern than a strict diet. Try to cover two-thirds of your plate with fresh fruits and vegetables. Some examples of foods that are often part of this pattern:   Grains - Whole grains like whole-grain bread and pasta, oats, couscous, brown rice, barley, and orzo.   Fruits - Many kinds and colors of fresh, frozen, dried, or canned fruits. Frozen or canned fruits with 100% fruit juice or water (without added sugar). Examples include apples, pears, berries, melons, bananas, plums, raisins, figs, and peaches.   Vegetables - Many kinds and colors of fresh, frozen, or canned vegetables. If canned, low sodium or salt free. If frozen, without added fat and sodium. Examples include avocados, peppers, tomatoes, spinach, kale, beans, carrots, peas, olives, cucumbers, hummus, soybeans, lentils, and kidney beans.   Dairy - Low-fat milk, cheese, and other dairy products. Greek yogurt, kefir, and plant-based milk alternatives like soy milk.   Lean meats, poultry, seafood, and proteins - [place], tuna, cod, and other fish. Shrimp, clams, scallops, and mussels. White meat chicken and turkey, eggs, dried beans, lentils, and tofu. Nuts such as walnuts, almonds, pecans, hazelnuts, cashews, peanuts, and nut butters. Seeds such as pumpkin, sesame, flax, and sunflower seeds.   Fats, oils, and other foods - Foods with healthy fats found in  fish, nuts, and avocados. Olive oil or vegetable oils such as canola oil. Use onions, garlic, spices, and herbs to season food.  What foods and drinks should I avoid or limit on a Mediterranean diet? -- A Mediterranean diet involves avoiding or limiting certain types of foods. Try to avoid foods with additives like artificial sweeteners. Avoid foods that are processed, refined, or preserved. These are often foods with a very long shelf life.   Grains to avoid - White bread, pasta, white rice, crackers, and biscuits.   Fruits to avoid - Fruits canned or frozen with extra sugar.   Vegetables to avoid - Commercially prepared potatoes and vegetable mixes, regular canned vegetables and juices, and vegetables frozen with sauce or pickled vegetables.   Dairy to avoid - Whole-fat dairy products like cheese, ice cream, whole milk, cream, and buttermilk.   Lean meats, poultry, seafood, and proteins to avoid - Red meat such as beef, pork, and lamb. Processed meats such as sausages, deli meats, salami, hot dogs, and mcintyre.   Fats, oils, and other foods to avoid - Butter, margarine, lard, gravies, sauces, and salad dressing. Cookies, cakes, candy, doughnuts, muffins, and other sweets.  All topics are updated as new evidence becomes available and our peer review process is complete.  This topic retrieved from [employer] on: Apr 04, 2024.  Topic 617041 Version 2.0  Release: 32.2.4 - C32.93  © 2024 UpToDate, Inc. and/or its affiliates. All rights reserved.  Consumer Information Use and Disclaimer   Disclaimer: This generalized information is a limited summary of diagnosis, treatment, and/or medication information. It is not meant to be comprehensive and should be used as a tool to help the user understand and/or assess potential diagnostic and treatment options. It does NOT include all information about conditions, treatments, medications, side effects, or risks that may apply to a specific patient. It is not intended to be medical  advice or a substitute for the medical advice, diagnosis, or treatment of a health care provider based on the health care provider's examination and assessment of a patient's specific and unique circumstances. Patients must speak with a health care provider for complete information about their health, medical questions, and treatment options, including any risks or benefits regarding use of medications. This information does not endorse any treatments or medications as safe, effective, or approved for treating a specific patient. Up[employer], [employer] and its affiliates disclaim any warranty or liability relating to this information or the use thereof.The use of this information is governed by the Terms of Use, available at https://www.[employer].com/en/know/clinical-effectiveness-terms. 2024© UpToDate, Inc. and its affiliates and/or licensors. All rights reserved.  Copyright   © 2024 UpToDate, Inc. and/or its affiliates. All rights reserved.

## 2024-07-19 NOTE — PROGRESS NOTES
Adult Annual Physical  Name: Wali Newman      : 1975      MRN: [id number]  Encounter Provider: Kai So DO  Encounter Date: 2024   Encounter department: [facility]    Assessment & Plan   1. Annual physical exam  2. Rectal hemorrhage  -     Ambulatory referral to Colorectal Surgery; Future  3. Family history of colon cancer  -     Ambulatory referral to Colorectal Surgery; Future    Immunizations and preventive care screenings were discussed with patient today. Appropriate education was printed on patient's after visit summary.        Counseling:  Alcohol/drug use: discussed moderation in alcohol intake, the recommendations for healthy alcohol use, and avoidance of illicit drug use.  Dental Health: discussed importance of regular tooth brushing, flossing, and dental visits.  Injury prevention: discussed safety/seat belts, safety helmets, smoke detectors, carbon dioxide detectors, and smoking near bedding or upholstery.  Sexual health: discussed sexually transmitted diseases, partner selection, use of condoms, avoidance of unintended pregnancy, and contraceptive alternatives.  Exercise: the importance of regular exercise/physical activity was discussed. Recommend exercise 3-5 times per week for at least 30 minutes.          History of Present Illness     Adult Annual Physical:  Patient presents for annual physical.     Diet and Physical Activity:  - Diet/Nutrition: well balanced diet.  - Exercise: walking.    Depression Screening:  - PHQ-2 Score: 0    General Health:  - Sleep: sleeps poorly.  - Hearing: normal hearing bilateral ears.  - Vision: wears glasses.  - Dental: no dental visits for > 1 year.     Health:  - History of STDs: no.   - Urinary symptoms: none.     Advanced Care Planning:  - Has an advanced directive?: no    - Has a durable medical POA?: no    - ACP document given to patient?: no      Review of Systems   Constitutional:  Negative for activity change, appetite  change, diaphoresis, fatigue and fever.   HENT: Negative.     Eyes:  Positive for visual disturbance.   Respiratory:  Negative for apnea, cough, chest tightness, shortness of breath and wheezing.    Cardiovascular:  Negative for chest pain, palpitations and leg swelling.   Gastrointestinal:  Positive for anal bleeding and blood in stool. Negative for abdominal distention, abdominal pain, constipation, diarrhea, nausea and vomiting.        Family hx of colorectal cancer   Endocrine: Negative for cold intolerance, heat intolerance, polydipsia, polyphagia and polyuria.   Genitourinary:  Negative for difficulty urinating, dysuria, flank pain, hematuria and urgency.   Musculoskeletal:  Negative for arthralgias, back pain, gait problem, joint swelling and myalgias.   Skin:  Negative for color change, rash and wound.   Allergic/Immunologic: Negative for environmental allergies, food allergies and immunocompromised state.   Neurological:  Negative for dizziness, seizures, syncope, speech difficulty, numbness and headaches.   Hematological:  Negative for adenopathy. Does not bruise/bleed easily.        Follows with Dr. Alvarez   Psychiatric/Behavioral:  Negative for agitation, behavioral problems, hallucinations, sleep disturbance and suicidal ideas.      Current Outpatient Medications on File Prior to Visit   Medication Sig Dispense Refill    hydrocortisone 1 % cream Apply topically 2 (two) times a day as needed for irritation (hemorrhoids) 30 g 1     No current facility-administered medications on file prior to visit.      Social History     Tobacco Use    Smoking status: Never    Smokeless tobacco: Never   Vaping Use    Vaping status: Never Used   Substance and Sexual Activity    Alcohol use: Yes     Comment: 4-5x weekly, one drink each time    Drug use: No    Sexual activity: Not on file     Comment: defer       Objective     /70 (BP Location: Left arm, Patient Position: Sitting, Cuff Size: Standard)   Pulse 97    "Temp 98.4 °F (36.9 °C) (Temporal)   Ht 5' 11\" (1.803 m)   Wt 83.5 kg (184 lb)   SpO2 98%   BMI 25.66 kg/m²     Physical Exam  Constitutional:       Appearance: He is well-developed.   HENT:      Head: Normocephalic and atraumatic.      Right Ear: External ear normal.      Left Ear: External ear normal.      Nose: Nose normal.   Eyes:      Conjunctiva/sclera: Conjunctivae normal.      Pupils: Pupils are equal, round, and reactive to light.   Cardiovascular:      Rate and Rhythm: Normal rate and regular rhythm.      Heart sounds: Normal heart sounds. No murmur heard.     No friction rub.   Pulmonary:      Effort: Pulmonary effort is normal. No respiratory distress.      Breath sounds: Normal breath sounds. No wheezing or rales.   Chest:      Chest wall: No tenderness.   Abdominal:      General: Bowel sounds are normal.      Palpations: Abdomen is soft.   Musculoskeletal:         General: Normal range of motion.      Cervical back: Normal range of motion and neck supple.   Skin:     General: Skin is warm and dry.      Capillary Refill: Capillary refill takes 2 to 3 seconds.   Neurological:      Mental Status: He is alert and oriented to person, place, and time.   Psychiatric:         Behavior: Behavior normal.         Thought Content: Thought content normal.         Judgment: Judgment normal.         "

## 2024-07-29 ENCOUNTER — PREP FOR PROCEDURE (OUTPATIENT)
Age: 49
End: 2024-07-29

## 2024-07-29 DIAGNOSIS — Z12.11 ENCOUNTER FOR SCREENING COLONOSCOPY: Primary | ICD-10-CM

## 2024-07-30 ENCOUNTER — APPOINTMENT (OUTPATIENT)
Dept: LAB | Facility: HOSPITAL | Age: 49
End: 2024-07-30
Payer: COMMERCIAL

## 2024-07-30 DIAGNOSIS — D72.89 ATYPICAL LYMPHOCYTES PRESENT ON PERIPHERAL BLOOD SMEAR: ICD-10-CM

## 2024-07-30 DIAGNOSIS — Z00.00 ROUTINE GENERAL MEDICAL EXAMINATION AT A HEALTH CARE FACILITY: ICD-10-CM

## 2024-07-30 LAB
BASOPHILS # BLD AUTO: 0.01 THOUSANDS/ÂΜL (ref 0–0.1)
BASOPHILS NFR BLD AUTO: 0 % (ref 0–1)
EOSINOPHIL # BLD AUTO: 0.11 THOUSAND/ÂΜL (ref 0–0.61)
EOSINOPHIL NFR BLD AUTO: 2 % (ref 0–6)
ERYTHROCYTE [DISTWIDTH] IN BLOOD BY AUTOMATED COUNT: 12.3 % (ref 11.6–15.1)
HCT VFR BLD AUTO: 44.5 % (ref 36.5–49.3)
HGB BLD-MCNC: 15.3 G/DL (ref 12–17)
IMM GRANULOCYTES # BLD AUTO: 0 THOUSAND/UL (ref 0–0.2)
IMM GRANULOCYTES NFR BLD AUTO: 0 % (ref 0–2)
LYMPHOCYTES # BLD AUTO: 1.36 THOUSANDS/ÂΜL (ref 0.6–4.47)
LYMPHOCYTES NFR BLD AUTO: 29 % (ref 14–44)
MCH RBC QN AUTO: 30.2 PG (ref 26.8–34.3)
MCHC RBC AUTO-ENTMCNC: 34.4 G/DL (ref 31.4–37.4)
MCV RBC AUTO: 88 FL (ref 82–98)
MONOCYTES # BLD AUTO: 0.38 THOUSAND/ÂΜL (ref 0.17–1.22)
MONOCYTES NFR BLD AUTO: 8 % (ref 4–12)
NEUTROPHILS # BLD AUTO: 2.86 THOUSANDS/ÂΜL (ref 1.85–7.62)
NEUTS SEG NFR BLD AUTO: 61 % (ref 43–75)
NRBC BLD AUTO-RTO: 0 /100 WBCS
PLATELET # BLD AUTO: 194 THOUSANDS/UL (ref 149–390)
PMV BLD AUTO: 10.2 FL (ref 8.9–12.7)
RBC # BLD AUTO: 5.06 MILLION/UL (ref 3.88–5.62)
WBC # BLD AUTO: 4.72 THOUSAND/UL (ref 4.31–10.16)

## 2024-07-30 PROCEDURE — 85025 COMPLETE CBC W/AUTO DIFF WBC: CPT

## 2024-07-30 PROCEDURE — 36415 COLL VENOUS BLD VENIPUNCTURE: CPT

## 2024-08-02 ENCOUNTER — OFFICE VISIT (OUTPATIENT)
Dept: HEMATOLOGY ONCOLOGY | Facility: CLINIC | Age: 49
End: 2024-08-02
Payer: COMMERCIAL

## 2024-08-02 ENCOUNTER — APPOINTMENT (OUTPATIENT)
Dept: LAB | Facility: HOSPITAL | Age: 49
End: 2024-08-02
Payer: COMMERCIAL

## 2024-08-02 ENCOUNTER — TELEPHONE (OUTPATIENT)
Age: 49
End: 2024-08-02

## 2024-08-02 VITALS
DIASTOLIC BLOOD PRESSURE: 66 MMHG | BODY MASS INDEX: 25.9 KG/M2 | SYSTOLIC BLOOD PRESSURE: 114 MMHG | TEMPERATURE: 98.4 F | WEIGHT: 185 LBS | HEIGHT: 71 IN | OXYGEN SATURATION: 97 % | HEART RATE: 66 BPM

## 2024-08-02 DIAGNOSIS — D72.819 LEUKOPENIA, UNSPECIFIED TYPE: Primary | ICD-10-CM

## 2024-08-02 DIAGNOSIS — D72.819 LEUKOPENIA, UNSPECIFIED TYPE: ICD-10-CM

## 2024-08-02 LAB
HIV 1+2 AB+HIV1 P24 AG SERPL QL IA: NORMAL
HIV 2 AB SERPL QL IA: NORMAL
HIV1 AB SERPL QL IA: NORMAL
HIV1 P24 AG SERPL QL IA: NORMAL

## 2024-08-02 PROCEDURE — 87389 HIV-1 AG W/HIV-1&-2 AB AG IA: CPT

## 2024-08-02 PROCEDURE — 99213 OFFICE O/P EST LOW 20 MIN: CPT | Performed by: INTERNAL MEDICINE

## 2024-08-02 PROCEDURE — 36415 COLL VENOUS BLD VENIPUNCTURE: CPT

## 2024-08-02 NOTE — TELEPHONE ENCOUNTER
Patients GI provider:  Dr. Mcintosh     Number to return call: [phone]    Reason for call: Pt calling to speak with the Dr or surgery coordinator. He has some questions about his upcoming colonoscopy and would like to discuss it in detail. One thing he did mention specifically is that he has hemorrhoids and was told by his referring Dr we could remove those during the scope so he would like to know if that accurate.     Scheduled procedure/appointment date if applicable:08.12.24

## 2024-08-02 NOTE — PROGRESS NOTES
[facility] [place]  [address] [facility] [address]  [phone]  [phone]    Wali Newman,1975, [id number]  08/02/24    Discussion:   In summary, this is a 48-year-old male with history of leukopenia and abnormal lymphocytes.  Recent WBC 4.7, hemoglobin 15.3, platelet count 194, normal differential.  Previous flow cytometry, HBV, HCV testing all negative.  Autoimmune studies likewise negative.  Generally he feels well, functions without restriction.  No new medical issues.  For completeness HIV test is requested.  If negative I asked him to continue follow-up under his PCP.  Utility of further testing under our supervision is low.  I discussed the above with the patient.  The patient  voiced understanding and agreement.  ______________________________________________________________________    No chief complaint on file.      HPI:  Oncology History    No history exists.       Interval History: Clinically stable.  ECOG-  0 - Asymptomatic    Review of Systems   Constitutional:  Negative for chills and fever.   HENT:  Negative for nosebleeds.    Eyes:  Negative for discharge.   Respiratory:  Negative for cough and shortness of breath.    Cardiovascular:  Negative for chest pain.   Gastrointestinal:  Negative for abdominal pain, constipation and diarrhea.   Endocrine: Negative for polydipsia.   Genitourinary:  Negative for hematuria.   Musculoskeletal:  Negative for arthralgias.   Skin:  Negative for color change.   Allergic/Immunologic: Negative for immunocompromised state.   Neurological:  Negative for dizziness and headaches.   Hematological:  Negative for adenopathy.   Psychiatric/Behavioral:  Negative for agitation.        Past Medical History:   Diagnosis Date    Known health problems: none     Rupture of right distal biceps tendon 12/16/2021    Tear of right supraspinatus tendon 05/24/2021     Patient Active Problem List   Diagnosis    Other hemorrhoids    Atypical  "lymphocytes present on peripheral blood smear    Red blood cell morphology abnormality    Leukopenia       Current Outpatient Medications:     hydrocortisone 1 % cream, Apply topically 2 (two) times a day as needed for irritation (hemorrhoids), Disp: 30 g, Rfl: 1  No Known Allergies  Past Surgical History:   Procedure Laterality Date    ESOPHAGOGASTRODUODENOSCOPY      LASIK      [place] RINSJ RPTD BICEPS/TRICEPS TDN DSTL W/WO TDN GRF Right 12/21/2021    Procedure: DISTAL BICEPS TENDON REPAIR;  Surgeon: Jatinder Jamil MD;  [facility]: [facility];  Service: Orthopedics     Social History     Objective:  Vitals:    08/02/24 0750   BP: 114/66   BP Location: Left arm   Patient Position: Sitting   Cuff Size: Standard   Pulse: 66   Temp: 98.4 °F (36.9 °C)   TempSrc: Temporal   SpO2: 97%   Weight: 83.9 kg (185 lb)   Height: 5' 11\" (1.803 m)     Physical Exam  Constitutional:       Appearance: He is well-developed.   HENT:      Head: Normocephalic and atraumatic.      Mouth/Throat:      Mouth: Mucous membranes are moist.   Eyes:      Pupils: Pupils are equal, round, and reactive to light.   Cardiovascular:      Rate and Rhythm: Normal rate and regular rhythm.      Heart sounds: No murmur heard.  Pulmonary:      Breath sounds: Normal breath sounds. No wheezing or rales.   Abdominal:      Palpations: Abdomen is soft.      Tenderness: There is no abdominal tenderness.   Musculoskeletal:         General: No tenderness. Normal range of motion.      Cervical back: Neck supple.   Lymphadenopathy:      Cervical: No cervical adenopathy.   Skin:     Findings: No erythema or rash.   Neurological:      Mental Status: He is alert and oriented to person, place, and time.      Cranial Nerves: No cranial nerve deficit.      Deep Tendon Reflexes: Reflexes are normal and symmetric.   Psychiatric:         Behavior: Behavior normal.           Labs:  I personally reviewed the labs and imaging pertinent to this patient care.  "

## 2024-08-02 NOTE — H&P (VIEW-ONLY)
[facility] [place]  [address] [facility] [address]  [phone]  [phone]    Wali Newman,1975, [id number]  08/02/24    Discussion:   In summary, this is a 48-year-old male with history of leukopenia and abnormal lymphocytes.  Recent WBC 4.7, hemoglobin 15.3, platelet count 194, normal differential.  Previous flow cytometry, HBV, HCV testing all negative.  Autoimmune studies likewise negative.  Generally he feels well, functions without restriction.  No new medical issues.  For completeness HIV test is requested.  If negative I asked him to continue follow-up under his PCP.  Utility of further testing under our supervision is low.  I discussed the above with the patient.  The patient  voiced understanding and agreement.  ______________________________________________________________________    No chief complaint on file.      HPI:  Oncology History    No history exists.       Interval History: Clinically stable.  ECOG-  0 - Asymptomatic    Review of Systems   Constitutional:  Negative for chills and fever.   HENT:  Negative for nosebleeds.    Eyes:  Negative for discharge.   Respiratory:  Negative for cough and shortness of breath.    Cardiovascular:  Negative for chest pain.   Gastrointestinal:  Negative for abdominal pain, constipation and diarrhea.   Endocrine: Negative for polydipsia.   Genitourinary:  Negative for hematuria.   Musculoskeletal:  Negative for arthralgias.   Skin:  Negative for color change.   Allergic/Immunologic: Negative for immunocompromised state.   Neurological:  Negative for dizziness and headaches.   Hematological:  Negative for adenopathy.   Psychiatric/Behavioral:  Negative for agitation.        Past Medical History:   Diagnosis Date    Known health problems: none     Rupture of right distal biceps tendon 12/16/2021    Tear of right supraspinatus tendon 05/24/2021     Patient Active Problem List   Diagnosis    Other hemorrhoids    Atypical  "lymphocytes present on peripheral blood smear    Red blood cell morphology abnormality    Leukopenia       Current Outpatient Medications:     hydrocortisone 1 % cream, Apply topically 2 (two) times a day as needed for irritation (hemorrhoids), Disp: 30 g, Rfl: 1  No Known Allergies  Past Surgical History:   Procedure Laterality Date    ESOPHAGOGASTRODUODENOSCOPY      LASIK      [place] RINSJ RPTD BICEPS/TRICEPS TDN DSTL W/WO TDN GRF Right 12/21/2021    Procedure: DISTAL BICEPS TENDON REPAIR;  Surgeon: Jatinder Jamil MD;  Location: [facility];  Service: Orthopedics     Social History     Objective:  Vitals:    08/02/24 0750   BP: 114/66   BP Location: Left arm   Patient Position: Sitting   Cuff Size: Standard   Pulse: 66   Temp: 98.4 °F (36.9 °C)   TempSrc: Temporal   SpO2: 97%   Weight: 83.9 kg (185 lb)   Height: 5' 11\" (1.803 m)     Physical Exam  Constitutional:       Appearance: He is well-developed.   HENT:      Head: Normocephalic and atraumatic.      Mouth/Throat:      Mouth: Mucous membranes are moist.   Eyes:      Pupils: Pupils are equal, round, and reactive to light.   Cardiovascular:      Rate and Rhythm: Normal rate and regular rhythm.      Heart sounds: No murmur heard.  Pulmonary:      Breath sounds: Normal breath sounds. No wheezing or rales.   Abdominal:      Palpations: Abdomen is soft.      Tenderness: There is no abdominal tenderness.   Musculoskeletal:         General: No tenderness. Normal range of motion.      Cervical back: Neck supple.   Lymphadenopathy:      Cervical: No cervical adenopathy.   Skin:     Findings: No erythema or rash.   Neurological:      Mental Status: He is alert and oriented to person, place, and time.      Cranial Nerves: No cranial nerve deficit.      Deep Tendon Reflexes: Reflexes are normal and symmetric.   Psychiatric:         Behavior: Behavior normal.           Labs:  I personally reviewed the labs and imaging pertinent to this patient care.  "

## 2024-08-05 NOTE — TELEPHONE ENCOUNTER
Patient called in with questions regarding hemorrhoids banding/removal at the same time of having colonoscopy. Patient was advised that these two procedures can not be done at the same time but during the colonoscopy the doctor will evaluate and is able to recommend next steps. Patient understood and have no further questions at this time.

## 2024-08-12 ENCOUNTER — HOSPITAL ENCOUNTER (OUTPATIENT)
Dept: GASTROENTEROLOGY | Facility: HOSPITAL | Age: 49
Setting detail: OUTPATIENT SURGERY
Discharge: HOME | End: 2024-08-12
Attending: COLON & RECTAL SURGERY
Payer: COMMERCIAL

## 2024-08-12 ENCOUNTER — ANESTHESIA (OUTPATIENT)
Dept: GASTROENTEROLOGY | Facility: HOSPITAL | Age: 49
End: 2024-08-12

## 2024-08-12 ENCOUNTER — ANESTHESIA EVENT (OUTPATIENT)
Dept: GASTROENTEROLOGY | Facility: HOSPITAL | Age: 49
End: 2024-08-12

## 2024-08-12 VITALS
HEART RATE: 81 BPM | RESPIRATION RATE: 16 BRPM | DIASTOLIC BLOOD PRESSURE: 66 MMHG | BODY MASS INDEX: 25.06 KG/M2 | WEIGHT: 179 LBS | OXYGEN SATURATION: 98 % | SYSTOLIC BLOOD PRESSURE: 111 MMHG | TEMPERATURE: 97.1 F | HEIGHT: 71 IN

## 2024-08-12 DIAGNOSIS — Z12.11 ENCOUNTER FOR SCREENING COLONOSCOPY: ICD-10-CM

## 2024-08-12 PROCEDURE — 88305 TISSUE EXAM BY PATHOLOGIST: CPT | Performed by: PATHOLOGY

## 2024-08-12 PROCEDURE — 45385 COLONOSCOPY W/LESION REMOVAL: CPT | Performed by: COLON & RECTAL SURGERY

## 2024-08-12 RX ORDER — PROPOFOL 10 MG/ML
INJECTION, EMULSION INTRAVENOUS AS NEEDED
Status: DISCONTINUED | OUTPATIENT
Start: 2024-08-12 | End: 2024-08-12

## 2024-08-12 RX ORDER — LIDOCAINE HYDROCHLORIDE 10 MG/ML
INJECTION, SOLUTION EPIDURAL; INFILTRATION; INTRACAUDAL; PERINEURAL AS NEEDED
Status: DISCONTINUED | OUTPATIENT
Start: 2024-08-12 | End: 2024-08-12

## 2024-08-12 RX ORDER — SODIUM CHLORIDE, SODIUM LACTATE, POTASSIUM CHLORIDE, CALCIUM CHLORIDE 600; 310; 30; 20 MG/100ML; MG/100ML; MG/100ML; MG/100ML
INJECTION, SOLUTION INTRAVENOUS CONTINUOUS PRN
Status: DISCONTINUED | OUTPATIENT
Start: 2024-08-12 | End: 2024-08-12

## 2024-08-12 RX ADMIN — PROPOFOL 50 MG: 10 INJECTION, EMULSION INTRAVENOUS at 07:35

## 2024-08-12 RX ADMIN — LIDOCAINE HYDROCHLORIDE 50 MG: 10 INJECTION, SOLUTION EPIDURAL; INFILTRATION; INTRACAUDAL; PERINEURAL at 07:34

## 2024-08-12 RX ADMIN — PROPOFOL 100 MG: 10 INJECTION, EMULSION INTRAVENOUS at 07:34

## 2024-08-12 RX ADMIN — PROPOFOL 50 MG: 10 INJECTION, EMULSION INTRAVENOUS at 07:38

## 2024-08-12 RX ADMIN — PROPOFOL 50 MG: 10 INJECTION, EMULSION INTRAVENOUS at 07:43

## 2024-08-12 RX ADMIN — PROPOFOL 20 MG: 10 INJECTION, EMULSION INTRAVENOUS at 07:48

## 2024-08-12 RX ADMIN — PROPOFOL 20 MG: 10 INJECTION, EMULSION INTRAVENOUS at 07:50

## 2024-08-12 RX ADMIN — SODIUM CHLORIDE, SODIUM LACTATE, POTASSIUM CHLORIDE, AND CALCIUM CHLORIDE: .6; .31; .03; .02 INJECTION, SOLUTION INTRAVENOUS at 07:27

## 2024-08-12 RX ADMIN — PROPOFOL 20 MG: 10 INJECTION, EMULSION INTRAVENOUS at 07:54

## 2024-08-12 RX ADMIN — PROPOFOL 20 MG: 10 INJECTION, EMULSION INTRAVENOUS at 07:46

## 2024-08-12 RX ADMIN — PROPOFOL 50 MG: 10 INJECTION, EMULSION INTRAVENOUS at 07:40

## 2024-08-12 RX ADMIN — PROPOFOL 50 MG: 10 INJECTION, EMULSION INTRAVENOUS at 07:36

## 2024-08-12 RX ADMIN — PROPOFOL 20 MG: 10 INJECTION, EMULSION INTRAVENOUS at 07:52

## 2024-08-12 NOTE — ANESTHESIA POSTPROCEDURE EVALUATION
Post-Op Assessment Note    CV Status:  Stable    Pain management: adequate       Mental Status:  Alert and awake   Hydration Status:  Euvolemic   PONV Controlled:  Controlled   Airway Patency:  Patent  Two or more mitigation strategies used for obstructive sleep apnea   Post Op Vitals Reviewed: Yes    No anethesia notable event occurred.    Staff: CRNA               BP   115/55   Temp      Pulse 92   Resp 16   SpO2 99

## 2024-08-12 NOTE — INTERVAL H&P NOTE
49yo male, for screening colonoscopy.  Screening colonoscopy,discussed in a face-to-face, personal, informed consent process, the benefits, alternatives, risks including not limited to bleeding, missed lesion, perforation requiring emergent surgery discussed/understood.  H&P reviewed. After examining the patient I find no changes in the patients condition since the H&P had been written.    Vitals:    08/12/24 0701   BP: 135/74   Pulse: 82   Resp: 16   Temp: (!) 97.4 °F (36.3 °C)   SpO2: 99%

## 2024-08-12 NOTE — ANESTHESIA PREPROCEDURE EVALUATION
Procedure:  COLONOSCOPY    Relevant Problems   No relevant active problems        Physical Exam    Airway    Mallampati score: II  TM Distance: >3 FB  Neck ROM: full     Dental       Cardiovascular      Pulmonary      Other Findings        Anesthesia Plan  ASA Score- 2     Anesthesia Type- IV sedation with anesthesia with ASA Monitors.         Additional Monitors:     Airway Plan:            Plan Factors-Exercise tolerance (METS): >4 METS.    Chart reviewed.        Patient is not a current smoker.  Patient did not smoke on day of surgery.            Induction- intravenous.    Postoperative Plan-     Perioperative Resuscitation Plan - Level 1 - Full Code.       Informed Consent- Anesthetic plan and risks discussed with patient.  I personally reviewed this patient with the CRNA. Discussed and agreed on the Anesthesia Plan with the CRNA..      NPO appropriate. Discussed benefits/risks of monitored anesthetic care and discussed providing a dynamic level of mild to deep sedation. Risks include awareness, airway obstruction, aspiration which may necessitate conversion to general anesthesia. All questions answered. Patient understands and wishes to proceed.    Anesthesia plan and consent discussed with Wali who expressed understanding and agreement. Risks/benefits and alternatives discussed with patient including possible PONV, sore throat, damage to teeth/lips/gums and possibility of rare anesthetic and surgical emergencies.

## 2024-08-13 PROCEDURE — 88305 TISSUE EXAM BY PATHOLOGIST: CPT | Performed by: PATHOLOGY

## 2024-09-11 DIAGNOSIS — K64.8 OTHER HEMORRHOIDS: ICD-10-CM

## 2024-09-11 RX ORDER — BENZOCAINE/MENTHOL 6 MG-10 MG
LOZENGE MUCOUS MEMBRANE 2 TIMES DAILY PRN
Qty: 30 G | Refills: 1 | Status: SHIPPED | OUTPATIENT
Start: 2024-09-11

## 2024-09-11 NOTE — TELEPHONE ENCOUNTER
Medication: Hydrocortisone 1% cream    Dose/Frequency: Apply topically 2 times a day as needed for irritation    Quantity: 30g    Pharmacy: [facility]    Office:   [x] PCP/Provider -   [] Speciality/Provider -     Does the patient have enough for 3 days?   [x] Yes   [] No - Send as HP to POD

## 2024-10-08 ENCOUNTER — NURSE TRIAGE (OUTPATIENT)
Age: 49
End: 2024-10-08

## 2024-10-08 ENCOUNTER — TELEPHONE (OUTPATIENT)
Age: 49
End: 2024-10-08

## 2024-10-08 NOTE — TELEPHONE ENCOUNTER
"Patient c/o pain and pressure in rectal area worsening over the last 2 months.  Denies all other symptoms. Denies n/v/d and constipation.  Patient states having normal daily bm's.   If up and moving no issues.  When sitting and laying worsens.  Did speak with PCP regarding pressure and was told to follow up with us.    Reason for Disposition   Patient wants to be seen    Answer Assessment - Initial Assessment Questions  1. SYMPTOM:  \"What's the main symptom you're concerned about?\" (e.g., pain, itching, swelling, rash)      Pressure and pain  2. ONSET: \"When did the above  start?\"      About 2 months ago  - worsening  3. RECTAL PAIN: \"Do you have any pain around your rectum?\" \"How bad is the pain?\"  (Scale 1-10; or mild, moderate, severe)   - MILD (1-3): doesn't interfere with normal activities    - MODERATE (4-7): interferes with normal activities or awakens from sleep, limping    - SEVERE (8-10): excruciating pain, unable to have a bowel movement       mild  4. RECTAL ITCHING: \"Do you have any itching in this area?\" \"How bad is the itching?\"  (Scale 1-10; or mild, moderate, severe)   - MILD - doesn't interfere with normal activities    - MODERATE-SEVERE: interferes with normal activities or awakens from sleep      denies  5. CONSTIPATION: \"Do you have constipation?\" If Yes, ask: \"How bad is it?\"      denies  6. CAUSE: \"What do you think is causing the anus symptoms?\"      unsure  7. OTHER SYMPTOMS: \"Do you have any other symptoms?\"  (e.g., rectal bleeding, abdominal pain, vomiting, fever)      denies    Protocols used: Rectal Symptoms-ADULT-[place]    "

## 2024-10-08 NOTE — TELEPHONE ENCOUNTER
Pt called with c/o rectal pain. Rectal pain has been going on for months now but recently has worsened. Pt requesting an apt with Dr. Anguiano as that is who did his colonoscopy in Aug. Transferred call to Dorcas in triage.

## 2024-11-04 ENCOUNTER — TELEPHONE (OUTPATIENT)
Age: 49
End: 2024-11-04

## 2024-11-04 NOTE — TELEPHONE ENCOUNTER
VM to r/s 11/11/24 appointment with Dr Anguiano.  Offered 11/8/24 with DR Anguiano at [facility] office.  Waiting response from patient.

## 2024-11-05 NOTE — PROGRESS NOTES
Colon and Rectal Surgery   Wali Newman 48 y.o. male MRN: [id number]   Encounter: [id number]  24   1:22 PM  Ambulatory Visit  Name: Wali Newman      : 1975      MRN: [id number]  Encounter Provider: Lucius Anguiano MD  Encounter Date: 2024   Encounter department: [facility] [place]    Assessment & Plan  Rectal pain  Wali returns today, I had seen him in August for his screening colonoscopy, adenoma with 5-year recall.  He has been having some rectal pains, not sharp, not typically with bowel movements sometimes at rest.  He has tried multiple treatments including witch hazel and Preparation H.    On examination he has some skin changes typical of steroid use/blanching and excoriation, high sphincter tone no masses palpated no obvious fissure, normal mucosa on anoscopy.    We did discuss dietary/lifestyle changes for more daily consistent formed bowel movements as well as local cares and I will check again in 6 months to ensure skin healing.    Orders:    Anoscopy      HPI  Wali Newman is a 48 y.o. male who is here for evaluation for rectal pain.     Pt reports having intermediate rectal pain (sharp) for over a year, has been worse the past few months.    pt reports bright red bleeding upon wiping. Pt reports feeling of lumps in anus as well as itching.     Patient has 1 bowel movement a day. Stool Consistency soft and formed.    Last colonoscopy was performed on 2024 by with a 5 year recall.    Pt reports family history of colon cancer in cousin.    Lab Results   Component Value Date    SODIUM 137 2024    K 4.1 2024     2024    CO2 31 2024    AGAP 4 2024    BUN 15 2024    CREATININE 1.10 2024    GLUF 101 (H) 2024    CALCIUM 10.1 2024    AST 15 2024    ALT 20 2024    ALKPHOS 38 2024    TP 7.3 2024    TBILI 1.25 (H) 2024    EGFR 80 2024     Lab Results   Component Value  "Date    WBC 4.72 07/30/2024    HGB 15.3 07/30/2024    HCT 44.5 07/30/2024    MCV 88 07/30/2024     07/30/2024     Historical Information   Past Medical History:   Diagnosis Date    Known health problems: none     Rupture of right distal biceps tendon 12/16/2021    Tear of right supraspinatus tendon 05/24/2021     Past Surgical History:   Procedure Laterality Date    ESOPHAGOGASTRODUODENOSCOPY      LASIK      [place] RINSJ RPTD BICEPS/TRICEPS TDN DSTL W/WO TDN GRF Right 12/21/2021    Procedure: DISTAL BICEPS TENDON REPAIR;  Surgeon: Jatinder Jamil MD;  Location[facility];  Service: Orthopedics       Meds/Allergies       Current Outpatient Medications:     hydrocortisone 1 % cream, Apply topically 2 (two) times a day as needed for irritation (hemorrhoids), Disp: 30 g, Rfl: 1      No Known Allergies      Social History   Social History     Substance and Sexual Activity   Alcohol Use Yes    Alcohol/week: 3.0 standard drinks of alcohol    Types: 3 Standard drinks or equivalent per week    Comment: 4-5x weekly, one drink each time     Social History     Substance and Sexual Activity   Drug Use Never     Social History     Tobacco Use   Smoking Status Never   Smokeless Tobacco Never         Family History:   Family History   Problem Relation Age of Onset    No Known Problems Mother     No Known Problems Father        Objective     Current Vitals:   Vitals:    11/08/24 1303   BP: 120/80   BP Location: Left arm   Patient Position: Sitting   Cuff Size: Large   Weight: 86.2 kg (190 lb)   Height: 5' 11\" (1.803 m)       Physical Exam:  General:no distress  Pulm:no increased work of breathing  Rectal: Blanching/excoriation typical of steroid ointment usage, increase in sphincter tone no fissure or mass palpated  Extremities:no edema    Anoscopy    Date/Time: 11/8/2024 1:20 PM    Performed by: Lucius Anguiano MD  Authorized by: Lucius Anguiano MD    Verbal consent obtained?: Yes    Consent given by:  " Patient  Patient identity confirmed:  Verbally with patient  Indications: rectal irritation    Scope type:  Anoscope   Normal rectal mucosa.  No obvious hemorrhoidal engorgement.

## 2024-11-06 NOTE — TELEPHONE ENCOUNTER
Patient called to verify if there were any earlier appt advised not at this luz elena, placed him on a wait list

## 2024-11-08 ENCOUNTER — OFFICE VISIT (OUTPATIENT)
Age: 49
End: 2024-11-08
Payer: COMMERCIAL

## 2024-11-08 VITALS
HEIGHT: 71 IN | BODY MASS INDEX: 26.6 KG/M2 | SYSTOLIC BLOOD PRESSURE: 120 MMHG | DIASTOLIC BLOOD PRESSURE: 80 MMHG | WEIGHT: 190 LBS

## 2024-11-08 DIAGNOSIS — D49.2 SKIN NEOPLASM: Primary | ICD-10-CM

## 2024-11-08 DIAGNOSIS — K62.89 RECTAL PAIN: Primary | ICD-10-CM

## 2024-11-08 PROCEDURE — 99213 OFFICE O/P EST LOW 20 MIN: CPT | Performed by: COLON & RECTAL SURGERY

## 2024-11-08 PROCEDURE — 46600 DIAGNOSTIC ANOSCOPY SPX: CPT | Performed by: COLON & RECTAL SURGERY

## 2024-11-08 NOTE — ASSESSMENT & PLAN NOTE
Wali returns today, I had seen him in August for his screening colonoscopy, adenoma with 5-year recall.  He has been having some rectal pains, not sharp, not typically with bowel movements sometimes at rest.  He has tried multiple treatments including witch hazel and Preparation H.    On examination he has some skin changes typical of steroid use/blanching and excoriation, high sphincter tone no masses palpated no obvious fissure, normal mucosa on anoscopy.    We did discuss dietary/lifestyle changes for more daily consistent formed bowel movements as well as local cares and I will check again in 6 months to ensure skin healing.    Orders:    Anoscopy

## 2025-03-10 ENCOUNTER — TELEPHONE (OUTPATIENT)
Age: 50
End: 2025-03-10

## 2025-03-12 ENCOUNTER — NURSE TRIAGE (OUTPATIENT)
Age: 50
End: 2025-03-12

## 2025-03-12 NOTE — TELEPHONE ENCOUNTER
"    FOLLOW UP: Please advise- pt asking other creams he can use    REASON FOR CONVERSATION: MEDICAL COMPLAINT-RECTAL DISCOMFORT    SYMPTOMS: rectal pain comes and goes mostly when sitting, itching and bleeding with wiping    OTHER: no straining, increased fiber and stool is formed and soft   Pt using Desitin     DISPOSITION: Schedule Office Visit (overriding See Today or Tomorrow in Office)      Reason for Disposition   Patient wants to be seen    Answer Assessment - Initial Assessment Questions  1. SYMPTOM:  \"What's the main symptom you're concerned about?\" (e.g., pain, itching, swelling, rash)      pain  2. ONSET: \"When did the pain  start?\"      Ongoing for a few months was last seen in nov   3. RECTAL PAIN: \"Do you have any pain around your rectum?\" \"How bad is the pain?\"  (Scale 0-10; or none, mild, moderate, severe)      Comes in goes   4. RECTAL ITCHING: \"Do you have any itching in this area?\" \"How bad is the itching?\"  (Scale 0-10; or none, mild, moderate, severe)      Yes itching   5. CONSTIPATION: \"Do you have constipation?\" If Yes, ask: \"How often do you have a bowel movement (BM)?\"  (Normal range: 3 times a day to every 3 days)  \"When was your last BM?\"        No   6. CAUSE: \"What do you think is causing the anus symptoms?\"      Unsure   7. OTHER SYMPTOMS: \"Do you have any other symptoms?\"  (e.g., abdomen pain, fever, rectal bleeding, vomiting)      Rectal bleeding with wiping    Protocols used: Rectal Symptoms-Adult-[place]    "

## 2025-03-12 NOTE — TELEPHONE ENCOUNTER
Patient contacted office with complaints of rectal discomfort, rectal bleeding, pain while sitting. Call transferred to nurse triage to further assist.

## 2025-04-02 NOTE — PROGRESS NOTES
"Name: Wali Newman      : 1975      MRN: [id number]  Encounter Provider: Lucius Anguiano MD  Encounter Date: 4/3/2025   Encounter department: [facility]  :  Assessment & Plan  Rectal pain  Continued perianal/anal excoriation typical of moisture, he still uses wet wipes.  Discussed local cares, steroid and wet wipe avoidance, may try bidet type toilet seat.    -High fiber diet 20-30g/day with increased fruits/vegetables/psyllium(metamucil or konsyl), increased hydration noncaffeinated beverages(handouts/samples provided)  -Warm soapy water, pat dry, desitin or calmoseptine to perianal skin as barrier  -Discontinue any wet wipes or hydrocortisone  -6months recheck, if ongoing irritation or sclerotic changes as seenin posterior midline then will consider biopsy                History of Present Illness   HPI    Wali Newman is here today for a follow up to rectal pain. His last office visit was on 24 at which time exam showed skin changes typical of steroid use/ blanching and excoriation, high sphincter tone. He states symptoms have improved but feels there is still room for more improvement.       Last colonoscopy was performed on 2024 by with a 5 year recall.     Review of Systems    Objective   Ht 5' 11\" (1.803 m)   Wt 89.8 kg (198 lb)   BMI 27.62 kg/m²      Physical Exam  Genitourinary:     Comments: perianal/anal excoriation typical of moisture, mild sclerotic changes on effacement posterior midline  Neurological:      Mental Status: He is alert.       "

## 2025-04-03 ENCOUNTER — OFFICE VISIT (OUTPATIENT)
Age: 50
End: 2025-04-03
Payer: COMMERCIAL

## 2025-04-03 VITALS — BODY MASS INDEX: 27.72 KG/M2 | WEIGHT: 198 LBS | HEIGHT: 71 IN

## 2025-04-03 DIAGNOSIS — K62.89 RECTAL PAIN: Primary | ICD-10-CM

## 2025-04-03 PROCEDURE — 99214 OFFICE O/P EST MOD 30 MIN: CPT | Performed by: COLON & RECTAL SURGERY

## 2025-04-03 NOTE — ASSESSMENT & PLAN NOTE
Continued perianal/anal excoriation typical of moisture, he still uses wet wipes.  Discussed local cares, steroid and wet wipe avoidance, may try bidet type toilet seat.    -High fiber diet 20-30g/day with increased fruits/vegetables/psyllium(metamucil or konsyl), increased hydration noncaffeinated beverages(handouts/samples provided)  -Warm soapy water, pat dry, desitin or calmoseptine to perianal skin as barrier  -Discontinue any wet wipes or hydrocortisone  -6months recheck, if ongoing irritation or sclerotic changes as seenin posterior midline then will consider biopsy

## 2025-07-08 ENCOUNTER — NURSE TRIAGE (OUTPATIENT)
Age: 50
End: 2025-07-08

## 2025-07-08 NOTE — TELEPHONE ENCOUNTER
"REASON FOR CONVERSATION: Anxiety    SYMPTOMS: increasing anxiety, mostly at night.    OTHER HEALTH INFORMATION: has had a \"year of change\", new job, new house, child graduating.    PROTOCOL DISPOSITION: See Within 3 Days in Office    CARE ADVICE PROVIDED: office visit, call with concerns    PRACTICE FOLLOW-UP: none      Reason for Disposition   Patient wants to be seen    Answer Assessment - Initial Assessment Questions  1. CONCERN: \"Did anything happen that prompted you to call today?\"       Unknown  \"Its been a year of change\"    2. ANXIETY SYMPTOMS: \"Can you describe how you (your loved one; patient) have been feeling?\" (e.g., tense, restless, panicky, anxious, keyed up, overwhelmed, sense of impending doom).       Restlessness, \"like a claustrophobia\", can't sleep    3. ONSET: \"How long have you been feeling this way?\" (e.g., hours, days, weeks)      2mo    4. SEVERITY: \"How would you rate the level of anxiety?\" (e.g., 0 - 10; or mild, moderate, severe).      Moderate    5. FUNCTIONAL IMPAIRMENT: \"How have these feelings affected your ability to do daily activities?\" \"Have you had more difficulty than usual doing your normal daily activities?\" (e.g., getting better, same, worse; self-care, school, work, interactions)      Denies    6. HISTORY: \"Have you felt this way before?\" \"Have you ever been diagnosed with an anxiety problem in the past?\" (e.g., generalized anxiety disorder, panic attacks, PTSD). If Yes, ask: \"How was this problem treated?\" (e.g., medicines, counseling, etc.)      Yes - unknown resolution last time. \"Its usually in small spurts\"    7. RISK OF HARM - SUICIDAL IDEATION: \"Do you ever have thoughts of hurting or killing yourself?\" If Yes, ask:  \"Do you have these feelings now?\" \"Do you have a plan on how you would do this?\"      Denies    8. TREATMENT:  \"What has been done so far to treat this anxiety?\" (e.g., medicines, relaxation strategies). \"What has helped?\"      \"Some time of calming " "pills\" given by wife.     9. THERAPIST: \"Do you have a counselor or therapist?\" If Yes, ask: \"What is their name?\"      Denies    10. POTENTIAL TRIGGERS: \"Do you drink caffeinated beverages (e.g., coffee, alex, teas), and how much daily?\" \"Do you drink alcohol or use any drugs?\" \"Have you started any new medicines recently?\"        Night time    11. PATIENT SUPPORT: \"Who is with you now?\" \"Who do you live with?\" \"Do you have family or friends who you can talk to?\"         Family    12. OTHER SYMPTOMS: \"Do you have any other symptoms?\" (e.g., feeling depressed, trouble concentrating, trouble sleeping, trouble breathing, palpitations or fast heartbeat, chest pain, sweating, nausea, or diarrhea)        \"Sadness\"    Protocols used: Anxiety and Panic Attack-Adult-[place]    "

## 2025-07-08 NOTE — TELEPHONE ENCOUNTER
Patient called back and attempted a warm transfer to nurse triage. All nurse's unavailable at the time. Please call patient back

## 2025-07-08 NOTE — TELEPHONE ENCOUNTER
Regarding: Anxiety, SOB  ----- Message from Angelina GASTON sent at 7/8/2025 11:01 AM EDT -----  Pt sent the following [email or web address] message:    Hi - I have a physical scheduled for 7/22 that I wanted to check if it was possible to move to sooner?  Not sure if I need to wait until that date because of insurance.  I'm experiencing anxiety symptoms that are causing me shortness of breath and being unable to sleep.       Thanks,     Wali

## 2025-07-09 ENCOUNTER — OFFICE VISIT (OUTPATIENT)
Dept: FAMILY MEDICINE CLINIC | Facility: CLINIC | Age: 50
End: 2025-07-09
Payer: COMMERCIAL

## 2025-07-09 VITALS
WEIGHT: 195.6 LBS | OXYGEN SATURATION: 97 % | TEMPERATURE: 98.2 F | BODY MASS INDEX: 27.38 KG/M2 | HEIGHT: 71 IN | HEART RATE: 71 BPM | DIASTOLIC BLOOD PRESSURE: 72 MMHG | SYSTOLIC BLOOD PRESSURE: 102 MMHG

## 2025-07-09 DIAGNOSIS — E83.42 HYPOMAGNESEMIA: ICD-10-CM

## 2025-07-09 DIAGNOSIS — D72.819 LEUKOPENIA, UNSPECIFIED TYPE: Primary | ICD-10-CM

## 2025-07-09 DIAGNOSIS — R45.89 ANXIETY ABOUT HEALTH: ICD-10-CM

## 2025-07-09 DIAGNOSIS — R07.89 ATYPICAL CHEST PAIN: Primary | ICD-10-CM

## 2025-07-09 DIAGNOSIS — F41.8: ICD-10-CM

## 2025-07-09 PROCEDURE — 99214 OFFICE O/P EST MOD 30 MIN: CPT | Performed by: FAMILY MEDICINE

## 2025-07-09 RX ORDER — SERTRALINE HYDROCHLORIDE 25 MG/1
25 TABLET, FILM COATED ORAL DAILY
Qty: 30 TABLET | Refills: 1 | Status: SHIPPED | OUTPATIENT
Start: 2025-07-09 | End: 2025-08-08

## 2025-07-09 NOTE — PROGRESS NOTES
Name: Wali Newman      : 1975      MRN: [id number]  Encounter Provider: Kai So DO  Encounter Date: 2025   Encounter department: [facility]  :  Assessment & Plan  Leukopenia, unspecified type    Orders:    CBC and differential    Hypomagnesemia    Orders:    Magnesium; Future    Separation anxiety disorder in adult    Orders:    sertraline (ZOLOFT) 25 mg tablet; Take 1 tablet (25 mg total) by mouth daily  I asked Mr. Mcfarland to with his insurance policy on clinical psychologist for anxiety counseling he will get us some names so we can make a referral to them would to see him again in about 6 to 8 weeks         History of Present Illness   Mr. Mcfarland is here Mr. 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 but the change in working from home has really bothered him also he has 1 son leaving for college this year and a second 1 next year very close to his sons actually gets teary-eyed when he talks about his sons leaving for college no marital strife he and his wife get along well but he is experiencing separation anxiety from his job and his children empty nest syndrome he denies depression but I think there may be a slight component of that also not sleeping well at night goes for walks at 11:00 at night because he cannot fall asleep no suicidal or homicidal ideation    Anxiety  Symptoms include nervous/anxious behavior. Patient reports no chest pain, dizziness, nausea, palpitations, shortness of breath or suicidal ideas.         Review of Systems   Constitutional:  Negative for appetite change, diaphoresis, fatigue and fever.   HENT: Negative.     Eyes: Negative.    Respiratory:  Negative for apnea, cough, chest tightness, shortness of breath and wheezing.    Cardiovascular:  Negative for chest pain, palpitations and leg swelling.   Gastrointestinal:  Negative for  "abdominal distention, abdominal pain, anal bleeding, constipation, diarrhea, nausea and vomiting.   Endocrine: Negative for cold intolerance, heat intolerance, polydipsia, polyphagia and polyuria.   Genitourinary:  Negative for difficulty urinating, dysuria, flank pain, hematuria and urgency.   Musculoskeletal:  Negative for arthralgias, back pain, gait problem, joint swelling and myalgias.   Skin:  Negative for color change, rash and wound.   Allergic/Immunologic: Negative for environmental allergies, food allergies and immunocompromised state.   Neurological:  Negative for dizziness, seizures, syncope, speech difficulty, numbness and headaches.   Hematological:  Negative for adenopathy. Does not bruise/bleed easily.   Psychiatric/Behavioral:  Positive for sleep disturbance. Negative for agitation, behavioral problems, hallucinations and suicidal ideas. The patient is nervous/anxious.        Objective   /72   Pulse 71   Temp 98.2 °F (36.8 °C)   Ht 5' 11\" (1.803 m)   Wt 88.7 kg (195 lb 9.6 oz)   SpO2 97%   BMI 27.28 kg/m²      Physical Exam  Constitutional:       Appearance: Normal appearance. He is well-developed.   HENT:      Head: Normocephalic and atraumatic.      Right Ear: External ear normal.      Left Ear: External ear normal.      Nose: Nose normal.     Eyes:      Conjunctiva/sclera: Conjunctivae normal.      Pupils: Pupils are equal, round, and reactive to light.       Cardiovascular:      Rate and Rhythm: Normal rate and regular rhythm.      Heart sounds: Normal heart sounds. No murmur heard.     No friction rub.   Pulmonary:      Effort: Pulmonary effort is normal. No respiratory distress.      Breath sounds: Normal breath sounds. No wheezing or rales.   Chest:      Chest wall: No tenderness.   Abdominal:      General: Bowel sounds are normal.      Palpations: Abdomen is soft.     Musculoskeletal:         General: Normal range of motion.      Cervical back: Normal range of motion and neck " supple.     Skin:     General: Skin is warm and dry.      Capillary Refill: Capillary refill takes 2 to 3 seconds.     Neurological:      General: No focal deficit present.      Mental Status: He is alert and oriented to person, place, and time.     Psychiatric:         Behavior: Behavior normal.         Thought Content: Thought content normal.         Judgment: Judgment normal.      Comments: Anxiety over change of job 2 sons that are potentially leaving for college within the next 2 years loss of dog no marital strife

## 2025-07-11 ENCOUNTER — APPOINTMENT (OUTPATIENT)
Dept: LAB | Facility: HOSPITAL | Age: 50
End: 2025-07-11
Payer: COMMERCIAL

## 2025-07-11 DIAGNOSIS — E83.42 HYPOMAGNESEMIA: ICD-10-CM

## 2025-07-11 DIAGNOSIS — R07.89 ATYPICAL CHEST PAIN: ICD-10-CM

## 2025-07-11 LAB
ALBUMIN SERPL BCG-MCNC: 4.6 G/DL (ref 3.5–5)
ALP SERPL-CCNC: 38 U/L (ref 34–104)
ALT SERPL W P-5'-P-CCNC: 15 U/L (ref 7–52)
ANION GAP SERPL CALCULATED.3IONS-SCNC: 7 MMOL/L (ref 4–13)
AST SERPL W P-5'-P-CCNC: 16 U/L (ref 13–39)
BASOPHILS # BLD AUTO: 0.01 THOUSANDS/ÂΜL (ref 0–0.1)
BASOPHILS NFR BLD AUTO: 0 % (ref 0–1)
BILIRUB SERPL-MCNC: 1.16 MG/DL (ref 0.2–1)
BUN SERPL-MCNC: 15 MG/DL (ref 5–25)
CALCIUM SERPL-MCNC: 9.2 MG/DL (ref 8.4–10.2)
CHLORIDE SERPL-SCNC: 104 MMOL/L (ref 96–108)
CHOLEST SERPL-MCNC: 186 MG/DL (ref ?–200)
CO2 SERPL-SCNC: 27 MMOL/L (ref 21–32)
CREAT SERPL-MCNC: 1.14 MG/DL (ref 0.6–1.3)
D DIMER PPP FEU-MCNC: <0.27 UG/ML FEU
EOSINOPHIL # BLD AUTO: 0.12 THOUSAND/ÂΜL (ref 0–0.61)
EOSINOPHIL NFR BLD AUTO: 3 % (ref 0–6)
ERYTHROCYTE [DISTWIDTH] IN BLOOD BY AUTOMATED COUNT: 12 % (ref 11.6–15.1)
GFR SERPL CREATININE-BSD FRML MDRD: 76 ML/MIN/1.73SQ M
GLUCOSE P FAST SERPL-MCNC: 89 MG/DL (ref 65–99)
HCT VFR BLD AUTO: 44.1 % (ref 36.5–49.3)
HDLC SERPL-MCNC: 58 MG/DL
HGB BLD-MCNC: 14.8 G/DL (ref 12–17)
IMM GRANULOCYTES # BLD AUTO: 0 THOUSAND/UL (ref 0–0.2)
IMM GRANULOCYTES NFR BLD AUTO: 0 % (ref 0–2)
LDLC SERPL CALC-MCNC: 114 MG/DL (ref 0–100)
LYMPHOCYTES # BLD AUTO: 1.37 THOUSANDS/ÂΜL (ref 0.6–4.47)
LYMPHOCYTES NFR BLD AUTO: 32 % (ref 14–44)
MAGNESIUM SERPL-MCNC: 2 MG/DL (ref 1.9–2.7)
MCH RBC QN AUTO: 30.1 PG (ref 26.8–34.3)
MCHC RBC AUTO-ENTMCNC: 33.6 G/DL (ref 31.4–37.4)
MCV RBC AUTO: 90 FL (ref 82–98)
MONOCYTES # BLD AUTO: 0.4 THOUSAND/ÂΜL (ref 0.17–1.22)
MONOCYTES NFR BLD AUTO: 9 % (ref 4–12)
NEUTROPHILS # BLD AUTO: 2.34 THOUSANDS/ÂΜL (ref 1.85–7.62)
NEUTS SEG NFR BLD AUTO: 56 % (ref 43–75)
NRBC BLD AUTO-RTO: 0 /100 WBCS
PLATELET # BLD AUTO: 217 THOUSANDS/UL (ref 149–390)
PMV BLD AUTO: 10.9 FL (ref 8.9–12.7)
POTASSIUM SERPL-SCNC: 4.1 MMOL/L (ref 3.5–5.3)
PROT SERPL-MCNC: 6.7 G/DL (ref 6.4–8.4)
RBC # BLD AUTO: 4.91 MILLION/UL (ref 3.88–5.62)
SODIUM SERPL-SCNC: 138 MMOL/L (ref 135–147)
TRIGL SERPL-MCNC: 68 MG/DL (ref ?–150)
TSH SERPL DL<=0.05 MIU/L-ACNC: 1.64 UIU/ML (ref 0.45–4.5)
WBC # BLD AUTO: 4.24 THOUSAND/UL (ref 4.31–10.16)

## 2025-07-11 PROCEDURE — 36415 COLL VENOUS BLD VENIPUNCTURE: CPT

## 2025-07-11 PROCEDURE — 83735 ASSAY OF MAGNESIUM: CPT

## 2025-07-11 PROCEDURE — 85379 FIBRIN DEGRADATION QUANT: CPT

## 2025-07-11 PROCEDURE — 84443 ASSAY THYROID STIM HORMONE: CPT | Performed by: FAMILY MEDICINE

## 2025-07-22 ENCOUNTER — OFFICE VISIT (OUTPATIENT)
Dept: FAMILY MEDICINE CLINIC | Facility: CLINIC | Age: 50
End: 2025-07-22
Payer: COMMERCIAL

## 2025-07-22 VITALS
SYSTOLIC BLOOD PRESSURE: 120 MMHG | DIASTOLIC BLOOD PRESSURE: 80 MMHG | WEIGHT: 191 LBS | TEMPERATURE: 97.8 F | HEART RATE: 60 BPM | BODY MASS INDEX: 26.74 KG/M2 | HEIGHT: 71 IN | OXYGEN SATURATION: 97 %

## 2025-07-22 DIAGNOSIS — H91.91 HEARING DEFICIT, RIGHT: ICD-10-CM

## 2025-07-22 DIAGNOSIS — F41.1 GENERALIZED ANXIETY DISORDER WITH PANIC ATTACKS: ICD-10-CM

## 2025-07-22 DIAGNOSIS — F41.0 GENERALIZED ANXIETY DISORDER WITH PANIC ATTACKS: ICD-10-CM

## 2025-07-22 DIAGNOSIS — R07.89 ATYPICAL CHEST PAIN: Primary | ICD-10-CM

## 2025-07-22 PROCEDURE — 99213 OFFICE O/P EST LOW 20 MIN: CPT | Performed by: FAMILY MEDICINE

## 2025-07-22 NOTE — PROGRESS NOTES
"Name: Wali Newman      : 1975      MRN: [id number]  Encounter Provider: Kai So DO  Encounter Date: 2025   Encounter department: [facility]  :  Assessment & Plan  Atypical chest pain         Hearing deficit, right               Depression Screening and Follow-up Plan: Patient was screened for depression during today's encounter. They screened negative with a PHQ-2 score of 0.        History of Present Illness   Mr. Mcfarland is here for a follow-up visit he is taking 25 mg of sertraline for generalized anxiety disorder and some obsessive-compulsive disorder he is doing fairly well starting to get some of the good effects of being on the medication 2 issues he would like to have investigated today the first is a's sensation of shortness of breath this occurs not all the time he he describes it as a feeling of uneasiness in the substernal region patient is seems very fit he does jog but needs a workup because he is 49 years old and we want a make sure that this does not represent a cardiac issue second issue is inability to hear properly out of the right ear both tympanic membranes are easily visualized no sign of any redness or infection plan hearing test      Review of Systems   Constitutional:  Positive for activity change.   HENT:  Positive for hearing loss.         Impaired hearing right ear   Respiratory:  Positive for shortness of breath.        Objective   /80 (BP Location: Left arm, Patient Position: Sitting)   Pulse 60   Temp 97.8 °F (36.6 °C) (Temporal)   Ht 5' 11\" (1.803 m)   Wt 86.6 kg (191 lb)   SpO2 97%   BMI 26.64 kg/m²      Physical Exam  Constitutional:       Appearance: Normal appearance.   HENT:      Right Ear: Tympanic membrane normal. There is no impacted cerumen.      Left Ear: Tympanic membrane normal. There is no impacted cerumen.     Eyes:      Pupils: Pupils are equal, round, and reactive to light.       Cardiovascular:      Rate and Rhythm: Normal " rate and regular rhythm.   Pulmonary:      Effort: Pulmonary effort is normal.      Breath sounds: Normal breath sounds.   Abdominal:      General: Abdomen is flat.      Palpations: Abdomen is soft.     Neurological:      Mental Status: He is alert.

## 2025-08-01 ENCOUNTER — HOSPITAL ENCOUNTER (OUTPATIENT)
Dept: NON INVASIVE DIAGNOSTICS | Facility: HOSPITAL | Age: 50
Discharge: HOME | End: 2025-08-01
Attending: FAMILY MEDICINE
Payer: COMMERCIAL

## 2025-08-01 ENCOUNTER — OFFICE VISIT (OUTPATIENT)
Dept: LAB | Facility: HOSPITAL | Age: 50
End: 2025-08-01
Payer: COMMERCIAL

## 2025-08-01 VITALS
SYSTOLIC BLOOD PRESSURE: 120 MMHG | HEART RATE: 80 BPM | BODY MASS INDEX: 26.74 KG/M2 | DIASTOLIC BLOOD PRESSURE: 80 MMHG | WEIGHT: 191 LBS | HEIGHT: 71 IN

## 2025-08-01 DIAGNOSIS — R07.89 ATYPICAL CHEST PAIN: ICD-10-CM

## 2025-08-01 LAB
AORTIC ROOT: 2.9 CM
ASCENDING AORTA: 3.6 CM
ATRIAL RATE: 54 BPM
BSA FOR ECHO PROCEDURE: 2.07 M2
E WAVE DECELERATION TIME: 224 MS
E/A RATIO: 1.61
FRACTIONAL SHORTENING: 38 (ref 28–44)
INTERVENTRICULAR SEPTUM IN DIASTOLE (PARASTERNAL SHORT AXIS VIEW): 1 CM
INTERVENTRICULAR SEPTUM: 1 CM (ref 0.6–1.1)
LAAS-AP2: 15.9 CM2
LAAS-AP4: 14 CM2
LEFT ATRIUM SIZE: 3.5 CM
LEFT ATRIUM VOLUME (MOD BIPLANE): 43 ML
LEFT ATRIUM VOLUME INDEX (MOD BIPLANE): 20.8 ML/M2
LEFT INTERNAL DIMENSION IN SYSTOLE: 3 CM (ref 2.1–4)
LEFT VENTRICULAR INTERNAL DIMENSION IN DIASTOLE: 4.8 CM (ref 3.5–6)
LEFT VENTRICULAR POSTERIOR WALL IN END DIASTOLE: 0.8 CM
LEFT VENTRICULAR STROKE VOLUME: 74 ML
LV EF US.2D.A4C+ESTIMATED: 71 %
LVSV (TEICH): 74 ML
MV E'TISSUE VEL-LAT: 14 CM/S
MV E'TISSUE VEL-SEP: 12 CM/S
MV PEAK A VEL: 0.41 M/S
MV PEAK E VEL: 66 CM/S
MV STENOSIS PRESSURE HALF TIME: 65 MS
MV VALVE AREA P 1/2 METHOD: 3.38
P AXIS: 52 DEGREES
PR INTERVAL: 190 MS
QRS AXIS: 67 DEGREES
QRSD INTERVAL: 86 MS
QT INTERVAL: 428 MS
QTC INTERVAL: 405 MS
RIGHT ATRIUM AREA SYSTOLE A4C: 16.5 CM2
RIGHT VENTRICLE ID DIMENSION: 3.8 CM
SL CV LEFT ATRIUM LENGTH A2C: 4.7 CM
SL CV LV EF: 60
SL CV PED ECHO LEFT VENTRICLE DIASTOLIC VOLUME (MOD BIPLANE) 2D: 108 ML
SL CV PED ECHO LEFT VENTRICLE SYSTOLIC VOLUME (MOD BIPLANE) 2D: 34 ML
T WAVE AXIS: 50 DEGREES
TRICUSPID ANNULAR PLANE SYSTOLIC EXCURSION: 2.2 CM
VENTRICULAR RATE: 54 BPM

## 2025-08-01 PROCEDURE — 93005 ELECTROCARDIOGRAM TRACING: CPT

## 2025-08-01 PROCEDURE — 93010 ELECTROCARDIOGRAM REPORT: CPT | Performed by: INTERNAL MEDICINE

## 2025-08-01 PROCEDURE — 93306 TTE W/DOPPLER COMPLETE: CPT

## 2025-08-01 PROCEDURE — 93306 TTE W/DOPPLER COMPLETE: CPT | Performed by: INTERNAL MEDICINE

## 2025-08-08 ENCOUNTER — OFFICE VISIT (OUTPATIENT)
Dept: AUDIOLOGY | Facility: CLINIC | Age: 50
End: 2025-08-08
Attending: FAMILY MEDICINE
Payer: COMMERCIAL

## 2025-08-08 DIAGNOSIS — H91.91 HEARING DEFICIT, RIGHT: ICD-10-CM

## 2025-08-08 DIAGNOSIS — H93.293 ABNORMAL AUDITORY PERCEPTION OF BOTH EARS: Primary | ICD-10-CM

## 2025-08-08 PROCEDURE — 92567 TYMPANOMETRY: CPT

## 2025-08-08 PROCEDURE — 92557 COMPREHENSIVE HEARING TEST: CPT

## 2025-08-17 DIAGNOSIS — F41.8: ICD-10-CM

## 2025-08-18 RX ORDER — SERTRALINE HYDROCHLORIDE 25 MG/1
25 TABLET, FILM COATED ORAL DAILY
Qty: 30 TABLET | Refills: 1 | Status: SHIPPED | OUTPATIENT
Start: 2025-08-18

## (undated) DEVICE — GLOVE INDICATOR PI UNDERGLOVE SZ 7.5 BLUE

## (undated) DEVICE — INTENDED FOR TISSUE SEPARATION, AND OTHER PROCEDURES THAT REQUIRE A SHARP SURGICAL BLADE TO PUNCTURE OR CUT.: Brand: BARD-PARKER SAFETY BLADES SIZE 15, STERILE

## (undated) DEVICE — GAUZE SPONGES,16 PLY: Brand: CURITY

## (undated) DEVICE — PAD GROUNDING ADULT

## (undated) DEVICE — OCCLUSIVE GAUZE STRIP,3% BISMUTH TRIBROMOPHENATE IN PETROLATUM BLEND: Brand: XEROFORM

## (undated) DEVICE — SUT MONOCRYL 4-0 PS-2 27 IN Y426H

## (undated) DEVICE — PLUMEPEN PRO 10FT

## (undated) DEVICE — GLOVE SRG BIOGEL 7.5

## (undated) DEVICE — ACE WRAP 4 IN STERILE

## (undated) DEVICE — ANTIBACTERIAL UNDYED BRAIDED (POLYGLACTIN 910), SYNTHETIC ABSORBABLE SUTURE: Brand: COATED VICRYL

## (undated) DEVICE — SPONGE PVP SCRUB WING STERILE

## (undated) DEVICE — 3M™ STERI-STRIP™ REINFORCED ADHESIVE SKIN CLOSURES, R1546, 1/4 IN X 4 IN (6 MM X 100 MM), 10 STRIPS/ENVELOPE: Brand: 3M™ STERI-STRIP™

## (undated) DEVICE — BANDAGE, ESMARK LF STR 4"X9'(20/CS): Brand: CYPRESS

## (undated) DEVICE — CHLORAPREP HI-LITE 26ML ORANGE

## (undated) DEVICE — U-DRAPE: Brand: CONVERTORS

## (undated) DEVICE — ACE WRAP 4 IN UNSTERILE

## (undated) DEVICE — SUT VICRYL PLUS 0 CTB-1 27 IN VCPB260H

## (undated) DEVICE — UNIVERSAL MAJOR EXTREMITY,KIT: Brand: CARDINAL HEALTH

## (undated) DEVICE — PADDING CAST 4 IN  COTTON STRL

## (undated) DEVICE — BULB SYRINGE,IRRIGATION WITH PROTECTIVE CAP: Brand: DOVER

## (undated) DEVICE — 3M™ STERI-STRIP™ REINFORCED ADHESIVE SKIN CLOSURES, R1547, 1/2 IN X 4 IN (12 MM X 100 MM), 6 STRIPS/ENVELOPE: Brand: 3M™ STERI-STRIP™